# Patient Record
Sex: MALE | Race: WHITE | NOT HISPANIC OR LATINO | URBAN - METROPOLITAN AREA
[De-identification: names, ages, dates, MRNs, and addresses within clinical notes are randomized per-mention and may not be internally consistent; named-entity substitution may affect disease eponyms.]

---

## 2017-01-04 ENCOUNTER — APPOINTMENT (RX ONLY)
Dept: URBAN - METROPOLITAN AREA CLINIC 24 | Facility: CLINIC | Age: 52
Setting detail: DERMATOLOGY
End: 2017-01-04

## 2017-01-04 DIAGNOSIS — L73.9 FOLLICULAR DISORDER, UNSPECIFIED: ICD-10-CM

## 2017-01-04 DIAGNOSIS — L738 OTHER SPECIFIED DISEASES OF HAIR AND HAIR FOLLICLES: ICD-10-CM

## 2017-01-04 DIAGNOSIS — B00.1 HERPESVIRAL VESICULAR DERMATITIS: ICD-10-CM

## 2017-01-04 DIAGNOSIS — L82.1 OTHER SEBORRHEIC KERATOSIS: ICD-10-CM

## 2017-01-04 DIAGNOSIS — B07.8 OTHER VIRAL WARTS: ICD-10-CM

## 2017-01-04 DIAGNOSIS — L663 OTHER SPECIFIED DISEASES OF HAIR AND HAIR FOLLICLES: ICD-10-CM

## 2017-01-04 PROBLEM — L02.424 FURUNCLE OF LEFT UPPER LIMB: Status: ACTIVE | Noted: 2017-01-04

## 2017-01-04 PROBLEM — L85.3 XEROSIS CUTIS: Status: ACTIVE | Noted: 2017-01-04

## 2017-01-04 PROBLEM — L55.1 SUNBURN OF SECOND DEGREE: Status: ACTIVE | Noted: 2017-01-04

## 2017-01-04 PROBLEM — J30.1 ALLERGIC RHINITIS DUE TO POLLEN: Status: ACTIVE | Noted: 2017-01-04

## 2017-01-04 PROBLEM — L02.221 FURUNCLE OF ABDOMINAL WALL: Status: ACTIVE | Noted: 2017-01-04

## 2017-01-04 PROBLEM — L02.223 FURUNCLE OF CHEST WALL: Status: ACTIVE | Noted: 2017-01-04

## 2017-01-04 PROBLEM — L02.422 FURUNCLE OF LEFT AXILLA: Status: ACTIVE | Noted: 2017-01-04

## 2017-01-04 PROBLEM — L02.423 FURUNCLE OF RIGHT UPPER LIMB: Status: ACTIVE | Noted: 2017-01-04

## 2017-01-04 PROBLEM — L02.421 FURUNCLE OF RIGHT AXILLA: Status: ACTIVE | Noted: 2017-01-04

## 2017-01-04 PROBLEM — L02.222 FURUNCLE OF BACK [ANY PART, EXCEPT BUTTOCK]: Status: ACTIVE | Noted: 2017-01-04

## 2017-01-04 PROCEDURE — ? LIQUID NITROGEN (COSMETIC)

## 2017-01-04 PROCEDURE — ? COUNSELING

## 2017-01-04 PROCEDURE — ? ORDER TESTS

## 2017-01-04 PROCEDURE — ? OTHER

## 2017-01-04 PROCEDURE — ? PRESCRIPTION

## 2017-01-04 PROCEDURE — ? LIQUID NITROGEN

## 2017-01-04 PROCEDURE — ? TREATMENT REGIMEN

## 2017-01-04 PROCEDURE — 99213 OFFICE O/P EST LOW 20 MIN: CPT | Mod: 25

## 2017-01-04 PROCEDURE — 17110 DESTRUCTION B9 LES UP TO 14: CPT

## 2017-01-04 RX ORDER — MUPIROCIN 20 MG/G
OINTMENT TOPICAL
Qty: 1 | Refills: 3 | Status: ERX | COMMUNITY
Start: 2017-01-04

## 2017-01-04 RX ADMIN — MUPIROCIN: 20 OINTMENT TOPICAL at 00:00

## 2017-01-04 ASSESSMENT — LOCATION ZONE DERM
LOCATION ZONE: FINGER
LOCATION ZONE: LEG
LOCATION ZONE: ARM
LOCATION ZONE: HAND
LOCATION ZONE: FACE
LOCATION ZONE: AXILLAE
LOCATION ZONE: TRUNK

## 2017-01-04 ASSESSMENT — LOCATION DETAILED DESCRIPTION DERM
LOCATION DETAILED: RIGHT AXILLARY VAULT
LOCATION DETAILED: RIGHT RIB CAGE
LOCATION DETAILED: LEFT SUPERIOR CENTRAL MALAR CHEEK
LOCATION DETAILED: LEFT AXILLARY VAULT
LOCATION DETAILED: LEFT DISTAL DORSAL FOREARM
LOCATION DETAILED: LEFT RIB CAGE
LOCATION DETAILED: LEFT SUPERIOR MEDIAL UPPER BACK
LOCATION DETAILED: RIGHT ANTERIOR PROXIMAL UPPER ARM
LOCATION DETAILED: LEFT RADIAL DORSAL HAND
LOCATION DETAILED: RIGHT RADIAL DORSAL HAND
LOCATION DETAILED: RIGHT DORSAL THUMB METACARPOPHALANGEAL JOINT
LOCATION DETAILED: STERNUM
LOCATION DETAILED: LEFT INFERIOR UPPER BACK
LOCATION DETAILED: LEFT ANTERIOR PROXIMAL UPPER ARM
LOCATION DETAILED: LEFT DISTAL PRETIBIAL REGION
LOCATION DETAILED: RIGHT VENTRAL DISTAL FOREARM
LOCATION DETAILED: LEFT ANTERIOR DISTAL UPPER ARM

## 2017-01-04 ASSESSMENT — LOCATION SIMPLE DESCRIPTION DERM
LOCATION SIMPLE: RIGHT UPPER ARM
LOCATION SIMPLE: RIGHT AXILLARY VAULT
LOCATION SIMPLE: LEFT HAND
LOCATION SIMPLE: ABDOMEN
LOCATION SIMPLE: RIGHT HAND
LOCATION SIMPLE: RIGHT THUMB
LOCATION SIMPLE: CHEST
LOCATION SIMPLE: LEFT UPPER ARM
LOCATION SIMPLE: RIGHT FOREARM
LOCATION SIMPLE: LEFT CHEEK
LOCATION SIMPLE: LEFT PRETIBIAL REGION
LOCATION SIMPLE: LEFT FOREARM
LOCATION SIMPLE: LEFT AXILLARY VAULT
LOCATION SIMPLE: LEFT UPPER BACK

## 2017-01-04 NOTE — PROCEDURE: LIQUID NITROGEN (COSMETIC)
Detail Level: Detailed
Render Post-Care Instructions In Note?: yes
Post-Care Instructions: I reviewed with the patient in detail post-care instructions. Patient is to wear sunprotection, and avoid picking at any of the treated lesions. Pt may apply Vaseline to crusted or scabbing areas.
Consent: The patient's consent was obtained including but not limited to risks of crusting, scabbing, blistering, scarring, darker or lighter pigmentary change, recurrence, incomplete removal and infection. The patient understands that the procedure is cosmetic in nature and is not covered by insurance.

## 2017-01-04 NOTE — PROCEDURE: TREATMENT REGIMEN
Initiate Treatment: Mupirocin to nares for decolonization
Detail Level: Zone
Continue Regimen: Panoxyl, hibiclens
Plan: Pityrosporum vs bacterial. Treatment pending culture results

## 2017-01-04 NOTE — HPI: INFECTION (FOLLICULITIS)
How Severe Is It?: moderate
Is This A New Presentation, Or A Follow-Up?: Follow Up Folliculitis
Additional History: He has been treated in the past w doxy, amoxicillin, cipro, diflucan, ivermectin, medrol dose pack, clindamycin solution, hibiclens, panoxl. 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 Treatments help but rash recurs once he stops antibiotics.

## 2017-01-04 NOTE — PROCEDURE: OTHER
Other (Free Text): Majority of \"warts\" appear to be SK
Note Text (......Xxx Chief Complaint.): This diagnosis correlates with the
Detail Level: Zone
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 He has been taking 400 mg daily. I did discuss that HSV involving the eye needs close follow up w ophthalmology. There is previous documentation of genital HSV but nothing around eye. Will discus w Dr Cantu tomorrow and give further recs to patient.

## 2017-01-13 ENCOUNTER — RX ONLY (OUTPATIENT)
Age: 52
Setting detail: RX ONLY
End: 2017-01-13

## 2017-01-13 RX ORDER — KETOCONAZOLE 20.5 MG/ML
SHAMPOO, SUSPENSION TOPICAL
Qty: 1 | Refills: 11 | Status: ERX | COMMUNITY
Start: 2017-01-13

## 2017-01-13 RX ORDER — FLUCONAZOLE 150 MG/1
TABLET ORAL
Qty: 14 | Refills: 0 | Status: ERX | COMMUNITY
Start: 2017-01-13

## 2017-03-06 ENCOUNTER — HOSPITAL ENCOUNTER (OUTPATIENT)
Age: 52
Setting detail: OBSERVATION
Discharge: OTHER INSTITUTION | End: 2017-03-07
Attending: EMERGENCY MEDICINE | Admitting: INTERNAL MEDICINE
Payer: COMMERCIAL

## 2017-03-06 ENCOUNTER — APPOINTMENT (OUTPATIENT)
Dept: MRI IMAGING | Age: 52
End: 2017-03-06
Attending: EMERGENCY MEDICINE
Payer: COMMERCIAL

## 2017-03-06 ENCOUNTER — APPOINTMENT (OUTPATIENT)
Dept: ULTRASOUND IMAGING | Age: 52
End: 2017-03-06
Attending: INTERNAL MEDICINE
Payer: COMMERCIAL

## 2017-03-06 ENCOUNTER — APPOINTMENT (OUTPATIENT)
Dept: CT IMAGING | Age: 52
End: 2017-03-06
Attending: EMERGENCY MEDICINE
Payer: COMMERCIAL

## 2017-03-06 DIAGNOSIS — R20.2 FACIAL PARESTHESIA: ICD-10-CM

## 2017-03-06 DIAGNOSIS — R20.2 ARM PARESTHESIA, LEFT: Primary | ICD-10-CM

## 2017-03-06 LAB
ALBUMIN SERPL BCP-MCNC: 4 G/DL (ref 3.5–5)
ALBUMIN/GLOB SERPL: 1.3 {RATIO} (ref 1.2–3.5)
ALP SERPL-CCNC: 55 U/L (ref 50–136)
ALT SERPL-CCNC: 56 U/L (ref 12–65)
ANION GAP BLD CALC-SCNC: 10 MMOL/L (ref 7–16)
AST SERPL W P-5'-P-CCNC: 30 U/L (ref 15–37)
ATRIAL RATE: 70 BPM
BACTERIA SPEC CULT: NORMAL
BASOPHILS # BLD AUTO: 0 K/UL (ref 0–0.2)
BASOPHILS # BLD: 1 % (ref 0–2)
BILIRUB SERPL-MCNC: 0.6 MG/DL (ref 0.2–1.1)
BUN SERPL-MCNC: 9 MG/DL (ref 6–23)
CALCIUM SERPL-MCNC: 9 MG/DL (ref 8.3–10.4)
CALCULATED P AXIS, ECG09: 52 DEGREES
CALCULATED R AXIS, ECG10: 34 DEGREES
CALCULATED T AXIS, ECG11: 49 DEGREES
CHLORIDE SERPL-SCNC: 105 MMOL/L (ref 98–107)
CO2 SERPL-SCNC: 28 MMOL/L (ref 21–32)
CREAT SERPL-MCNC: 1.12 MG/DL (ref 0.8–1.5)
D DIMER PPP FEU-MCNC: <0.19 UG/ML(FEU)
DIAGNOSIS, 93000: NORMAL
DIASTOLIC BP, ECG02: NORMAL MMHG
DIFFERENTIAL METHOD BLD: ABNORMAL
EOSINOPHIL # BLD: 0.2 K/UL (ref 0–0.8)
EOSINOPHIL NFR BLD: 3 % (ref 0.5–7.8)
ERYTHROCYTE [DISTWIDTH] IN BLOOD BY AUTOMATED COUNT: 12.3 % (ref 11.9–14.6)
GLOBULIN SER CALC-MCNC: 3.2 G/DL (ref 2.3–3.5)
GLUCOSE BLD STRIP.AUTO-MCNC: 99 MG/DL (ref 65–100)
GLUCOSE SERPL-MCNC: 93 MG/DL (ref 65–100)
HCT VFR BLD AUTO: 43.7 % (ref 41.1–50.3)
HGB BLD-MCNC: 15.4 G/DL (ref 13.6–17.2)
IMM GRANULOCYTES # BLD: 0.1 K/UL (ref 0–0.5)
IMM GRANULOCYTES NFR BLD AUTO: 0.8 % (ref 0–5)
INR BLD: 1.1 (ref 0.9–1.2)
LYMPHOCYTES # BLD AUTO: 36 % (ref 13–44)
LYMPHOCYTES # BLD: 2.1 K/UL (ref 0.5–4.6)
MCH RBC QN AUTO: 30.4 PG (ref 26.1–32.9)
MCHC RBC AUTO-ENTMCNC: 35.2 G/DL (ref 31.4–35)
MCV RBC AUTO: 86.4 FL (ref 79.6–97.8)
MONOCYTES # BLD: 0.4 K/UL (ref 0.1–1.3)
MONOCYTES NFR BLD AUTO: 6 % (ref 4–12)
NEUTS SEG # BLD: 3.2 K/UL (ref 1.7–8.2)
NEUTS SEG NFR BLD AUTO: 53 % (ref 43–78)
P-R INTERVAL, ECG05: 170 MS
PLATELET # BLD AUTO: 161 K/UL (ref 150–450)
PMV BLD AUTO: 11.9 FL (ref 10.8–14.1)
POTASSIUM SERPL-SCNC: 3.5 MMOL/L (ref 3.5–5.1)
PROT SERPL-MCNC: 7.2 G/DL (ref 6.3–8.2)
PT BLD: 12.9 SECS (ref 9.6–11.6)
Q-T INTERVAL, ECG07: 390 MS
QRS DURATION, ECG06: 90 MS
QTC CALCULATION (BEZET), ECG08: 421 MS
RBC # BLD AUTO: 5.06 M/UL (ref 4.23–5.67)
SERVICE CMNT-IMP: NORMAL
SODIUM SERPL-SCNC: 143 MMOL/L (ref 136–145)
SYSTOLIC BP, ECG01: NORMAL MMHG
TROPONIN I BLD-MCNC: 0.01 NG/ML (ref 0–0.08)
VENTRICULAR RATE, ECG03: 70 BPM
WBC # BLD AUTO: 5.9 K/UL (ref 4.3–11.1)

## 2017-03-06 PROCEDURE — 74011250636 HC RX REV CODE- 250/636: Performed by: INTERNAL MEDICINE

## 2017-03-06 PROCEDURE — 82962 GLUCOSE BLOOD TEST: CPT

## 2017-03-06 PROCEDURE — 80053 COMPREHEN METABOLIC PANEL: CPT | Performed by: EMERGENCY MEDICINE

## 2017-03-06 PROCEDURE — 93005 ELECTROCARDIOGRAM TRACING: CPT | Performed by: EMERGENCY MEDICINE

## 2017-03-06 PROCEDURE — 99218 HC RM OBSERVATION: CPT

## 2017-03-06 PROCEDURE — 99285 EMERGENCY DEPT VISIT HI MDM: CPT | Performed by: EMERGENCY MEDICINE

## 2017-03-06 PROCEDURE — 87641 MR-STAPH DNA AMP PROBE: CPT | Performed by: INTERNAL MEDICINE

## 2017-03-06 PROCEDURE — 70450 CT HEAD/BRAIN W/O DYE: CPT

## 2017-03-06 PROCEDURE — 70551 MRI BRAIN STEM W/O DYE: CPT

## 2017-03-06 PROCEDURE — 85379 FIBRIN DEGRADATION QUANT: CPT | Performed by: INTERNAL MEDICINE

## 2017-03-06 PROCEDURE — 85610 PROTHROMBIN TIME: CPT

## 2017-03-06 PROCEDURE — 84484 ASSAY OF TROPONIN QUANT: CPT

## 2017-03-06 PROCEDURE — 93880 EXTRACRANIAL BILAT STUDY: CPT

## 2017-03-06 PROCEDURE — 85025 COMPLETE CBC W/AUTO DIFF WBC: CPT | Performed by: EMERGENCY MEDICINE

## 2017-03-06 RX ORDER — AMOXICILLIN 250 MG
2 CAPSULE ORAL
Status: DISCONTINUED | OUTPATIENT
Start: 2017-03-06 | End: 2017-03-07 | Stop reason: HOSPADM

## 2017-03-06 RX ORDER — SODIUM CHLORIDE 0.9 % (FLUSH) 0.9 %
5-10 SYRINGE (ML) INJECTION EVERY 8 HOURS
Status: DISCONTINUED | OUTPATIENT
Start: 2017-03-06 | End: 2017-03-07 | Stop reason: HOSPADM

## 2017-03-06 RX ORDER — ENOXAPARIN SODIUM 100 MG/ML
40 INJECTION SUBCUTANEOUS EVERY 24 HOURS
Status: DISCONTINUED | OUTPATIENT
Start: 2017-03-06 | End: 2017-03-07

## 2017-03-06 RX ORDER — SODIUM CHLORIDE 0.9 % (FLUSH) 0.9 %
5-10 SYRINGE (ML) INJECTION AS NEEDED
Status: DISCONTINUED | OUTPATIENT
Start: 2017-03-06 | End: 2017-03-07 | Stop reason: HOSPADM

## 2017-03-06 RX ORDER — ASPIRIN 325 MG
325 TABLET ORAL DAILY
Status: DISCONTINUED | OUTPATIENT
Start: 2017-03-07 | End: 2017-03-07 | Stop reason: HOSPADM

## 2017-03-06 RX ORDER — SODIUM CHLORIDE 0.9 % (FLUSH) 0.9 %
5 SYRINGE (ML) INJECTION AS NEEDED
Status: DISCONTINUED | OUTPATIENT
Start: 2017-03-06 | End: 2017-03-07 | Stop reason: HOSPADM

## 2017-03-06 RX ADMIN — ENOXAPARIN SODIUM 40 MG: 40 INJECTION SUBCUTANEOUS at 20:08

## 2017-03-06 RX ADMIN — Medication 10 ML: at 22:00

## 2017-03-06 NOTE — ED PROVIDER NOTES
HPI Comments: 73-year-old male noted onset of numbness in his left side especially his left arm and face at 5:00 yesterday. Involvement in regard last about 30 minutes with involvement of the face it persisted into today. On the left side. Not noticing any numbness or tingling in the left leg. There is no pain including no headache or chest pain. No change in vision speech swallowing hearing or taste. There is some family history of migraine. He's never had a problem like this before. He has not noted any facial droop or clumsiness with his left hand. Patient is a 50 y.o. male presenting with numbness. The history is provided by the patient. Numbness   This is a new problem. The current episode started yesterday. The problem has been gradually improving. There was left facial and left upper extremity focality noted. Primary symptoms include loss of sensation. Pertinent negatives include no focal weakness, no loss of balance, no slurred speech, no speech difficulty, no movement disorder, no visual change, no auditory change and no mental status change. There has been no fever. Pertinent negatives include no shortness of breath, no chest pain, no vomiting, no headaches and no nausea. Associated medical issues do not include bleeding disorder, seizures or CVA. Past Medical History:   Diagnosis Date    History of kidney stones 1982       History reviewed. No pertinent surgical history. History reviewed. No pertinent family history. Social History     Social History    Marital status:      Spouse name: N/A    Number of children: N/A    Years of education: N/A     Occupational History    Not on file.      Social History Main Topics    Smoking status: Never Smoker    Smokeless tobacco: Not on file    Alcohol use No    Drug use: Not on file    Sexual activity: Not on file     Other Topics Concern    Not on file     Social History Narrative         ALLERGIES: Amoxil [amoxicillin] and Codeine    Review of Systems   Constitutional: Negative for chills and fever. Respiratory: Negative for cough and shortness of breath. Cardiovascular: Negative for chest pain and palpitations. Gastrointestinal: Negative for abdominal pain, diarrhea, nausea and vomiting. Genitourinary: Negative for dysuria and flank pain. Musculoskeletal: Negative for back pain and neck pain. Skin: Negative for color change and rash. Neurological: Positive for numbness. Negative for dizziness, focal weakness, seizures, syncope, speech difficulty, weakness, light-headedness, headaches and loss of balance. All other systems reviewed and are negative. Vitals:    03/06/17 1441   Pulse: 74   Resp: 14   Temp: 98.7 °F (37.1 °C)   SpO2: 99%   Weight: 120.2 kg (265 lb)   Height: 5' 10\" (1.778 m)            Physical Exam   Constitutional: He is oriented to person, place, and time. He appears well-developed and well-nourished. No distress. HENT:   Head: Normocephalic and atraumatic. Mouth/Throat: Oropharynx is clear and moist. No oropharyngeal exudate. Eyes: Conjunctivae and EOM are normal. Pupils are equal, round, and reactive to light. Neck: Normal range of motion. Neck supple. Cardiovascular: Normal rate, regular rhythm and intact distal pulses. Murmur heard. Systolic murmur is present with a grade of 3/6   Pulmonary/Chest: Breath sounds normal. No respiratory distress. Abdominal: No hernia. Neurological: He is alert and oriented to person, place, and time. A sensory deficit is present. Gait normal. GCS eye subscore is 4. GCS verbal subscore is 5. GCS motor subscore is 6. Nl speech  Normal finger-nose testing. No drift. Leg strength is normal.  Decreased sensation to light touch left face   Skin: Skin is warm and dry. Psychiatric: He has a normal mood and affect. His speech is normal.   Nursing note and vitals reviewed.        MDM  Number of Diagnoses or Management Options  Diagnosis management comments: I do not believe this to be Bell's palsy. Possibly migraine equivalent. concern for TIA or CVA. Basic concern is the patient states he has a heart murmur this never been investigated. Head CT, EKG, screening labs. Amount and/or Complexity of Data Reviewed  Clinical lab tests: ordered and reviewed  Tests in the radiology section of CPT®: ordered and reviewed  Tests in the medicine section of CPT®: ordered and reviewed  Discuss the patient with other providers: yes  Independent visualization of images, tracings, or specimens: yes    Risk of Complications, Morbidity, and/or Mortality  Presenting problems: moderate  Diagnostic procedures: low  Management options: moderate      ED Course       Procedures      Results Include:    Recent Results (from the past 24 hour(s))   POC TROPONIN-I    Collection Time: 03/06/17  2:45 PM   Result Value Ref Range    Troponin-I (POC) 0.01 0.0 - 0.08 ng/ml   CBC WITH AUTOMATED DIFF    Collection Time: 03/06/17  2:47 PM   Result Value Ref Range    WBC 5.9 4.3 - 11.1 K/uL    RBC 5.06 4.23 - 5.67 M/uL    HGB 15.4 13.6 - 17.2 g/dL    HCT 43.7 41.1 - 50.3 %    MCV 86.4 79.6 - 97.8 FL    MCH 30.4 26.1 - 32.9 PG    MCHC 35.2 (H) 31.4 - 35.0 g/dL    RDW 12.3 11.9 - 14.6 %    PLATELET 741 583 - 425 K/uL    MPV 11.9 10.8 - 14.1 FL    DF AUTOMATED      NEUTROPHILS 53 43 - 78 %    LYMPHOCYTES 36 13 - 44 %    MONOCYTES 6 4.0 - 12.0 %    EOSINOPHILS 3 0.5 - 7.8 %    BASOPHILS 1 0.0 - 2.0 %    IMMATURE GRANULOCYTES 0.8 0.0 - 5.0 %    ABS. NEUTROPHILS 3.2 1.7 - 8.2 K/UL    ABS. LYMPHOCYTES 2.1 0.5 - 4.6 K/UL    ABS. MONOCYTES 0.4 0.1 - 1.3 K/UL    ABS. EOSINOPHILS 0.2 0.0 - 0.8 K/UL    ABS. BASOPHILS 0.0 0.0 - 0.2 K/UL    ABS. IMM.  GRANS. 0.1 0.0 - 0.5 K/UL   METABOLIC PANEL, COMPREHENSIVE    Collection Time: 03/06/17  2:47 PM   Result Value Ref Range    Sodium 143 136 - 145 mmol/L    Potassium 3.5 3.5 - 5.1 mmol/L    Chloride 105 98 - 107 mmol/L    CO2 28 21 - 32 mmol/L Anion gap 10 7 - 16 mmol/L    Glucose 93 65 - 100 mg/dL    BUN 9 6 - 23 MG/DL    Creatinine 1.12 0.8 - 1.5 MG/DL    GFR est AA >60 >60 ml/min/1.73m2    GFR est non-AA >60 >60 ml/min/1.73m2    Calcium 9.0 8.3 - 10.4 MG/DL    Bilirubin, total 0.6 0.2 - 1.1 MG/DL    ALT (SGPT) 56 12 - 65 U/L    AST (SGOT) 30 15 - 37 U/L    Alk. phosphatase 55 50 - 136 U/L    Protein, total 7.2 6.3 - 8.2 g/dL    Albumin 4.0 3.5 - 5.0 g/dL    Globulin 3.2 2.3 - 3.5 g/dL    A-G Ratio 1.3 1.2 - 3.5     EKG, 12 LEAD, INITIAL    Collection Time: 03/06/17  2:49 PM   Result Value Ref Range    Systolic BP  mmHg    Diastolic BP  mmHg    Ventricular Rate 70 BPM    Atrial Rate 70 BPM    P-R Interval 170 ms    QRS Duration 90 ms    Q-T Interval 390 ms    QTC Calculation (Bezet) 421 ms    Calculated P Axis 52 degrees    Calculated R Axis 34 degrees    Calculated T Axis 49 degrees    Diagnosis       !! AGE AND GENDER SPECIFIC ECG ANALYSIS !! Normal sinus rhythm  Normal ECG  No previous ECGs available     POC PT/INR    Collection Time: 03/06/17  2:53 PM   Result Value Ref Range    Prothrombin time (POC) 12.9 (H) 9.6 - 11.6 SECS    INR (POC) 1.1 0.9 - 1.2     GLUCOSE, POC    Collection Time: 03/06/17  2:53 PM   Result Value Ref Range    Glucose (POC) 99 65 - 100 mg/dL     Ct Head Wo Cont    Result Date: 3/6/2017  Noncontrast head CT Clinical Indication: Left side paresthesias for 2 days. Technique: Noncontrast axial images were obtained through the brain. Comparison: None available Findings: There is no acute intracranial hemorrhage, hydrocephalus, intra-axial mass, or mass-effect. There is no CT evidence of acute large artery territorial infarction or abnormal extra-axial fluid collection. The mastoid air cells and paranasal sinuses are notable for partially visualized dependent fluid and debris in the left maxillary sinus. No displaced skull fractures are present. Impression: No acute intracranial abnormality. Left maxillary sinusitis. Spoke with the hospitalist who will see the patient. MRI ordered. We'll rate checked BP is initial was about 160/85.

## 2017-03-06 NOTE — H&P
HOSPITALIST HISTORY AND PHYSICAL  NAME:  Satnam Avalos   Age:  50 y.o.  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 Patient states he has been feeling generally unwell for the last several weeks and unable to sleep, has a very dry mouth. He is not on any meds for the HTN. And the murmur has never been investigated. Yesterday evening around 5pm he started experiencing a tingling and numbness in the left arm that went into the left side of his face. It came up his arm in a wave and then went down his arm in a wave but the numbness in the face did not resolve. It lasted about 40 min. Seen by doctor at his job this am because the left sided numbness bothered him and he was referred to our ER. In the ER he is concerning for tia vs cva. So hospitalist asked to admit. Complete ROS done and is as stated in HPI or otherwise negative  Past Medical History:   Diagnosis Date    History of kidney stones       History reviewed. No pertinent surgical history. Prior to Admission Medications   Prescriptions Last Dose Informant Patient Reported? Taking?   acyclovir (ZOVIRAX) 200 mg capsule   Yes No   Sig: Take  by mouth every four (4) hours (while awake). tadalafil (CIALIS) 5 mg tablet   No No   Sig: Take 1 Tab by mouth daily. Facility-Administered Medications: None     Allergies   Allergen Reactions    Amoxil [Amoxicillin] Not Reported This Time    Codeine Not Reported This Time      Social History   Substance Use Topics    Smoking status: Never Smoker    Smokeless tobacco: Not on file    Alcohol use No      History reviewed. No pertinent family history.    Objective:     Visit Vitals    /88    Pulse 81    Temp 98.7 °F (37.1 °C)    Resp 14    Ht 5' 10\" (1.778 m)    Wt 120.2 kg (265 lb)    SpO2 95%    BMI 38.02 kg/m2      Temp (24hrs), Av.7 °F (37.1 °C), Min:98.7 °F (37.1 °C), Max:98.7 °F (37.1 °C)    Oxygen Therapy  O2 Sat (%): 95 % (17 1609)  Pulse via Oximetry: 79 beats per minute (17 1609)  O2 Device: Room air (17 1441)  Physical Exam:  General:    Alert, cooperative, no distress, appears stated age. Head:   Normocephalic, without obvious abnormality, atraumatic. Nose:  Nares normal. No drainage or sinus tenderness. Lungs:   Clear to auscultation bilaterally. No Wheezing or Rhonchi. No rales. Heart:   Regular rate and rhythm,  systolic murmur, rub or gallop. Abdomen:   Soft, non-tender. Not distended. Bowel sounds normal.   Extremities: No cyanosis. No edema. No clubbing  Skin:     Texture, turgor normal. No rashes or lesions. Not Jaundiced  Neurologic: Alert and oriented x 3, no focal deficits   Data Review: personally by me   Recent Results (from the past 24 hour(s))   POC TROPONIN-I    Collection Time: 17  2:45 PM   Result Value Ref Range    Troponin-I (POC) 0.01 0.0 - 0.08 ng/ml   CBC WITH AUTOMATED DIFF    Collection Time: 17  2:47 PM   Result Value Ref Range    WBC 5.9 4.3 - 11.1 K/uL    RBC 5.06 4.23 - 5.67 M/uL    HGB 15.4 13.6 - 17.2 g/dL    HCT 43.7 41.1 - 50.3 %    MCV 86.4 79.6 - 97.8 FL    MCH 30.4 26.1 - 32.9 PG    MCHC 35.2 (H) 31.4 - 35.0 g/dL    RDW 12.3 11.9 - 14.6 %    PLATELET 826 536 - 155 K/uL    MPV 11.9 10.8 - 14.1 FL    DF AUTOMATED      NEUTROPHILS 53 43 - 78 %    LYMPHOCYTES 36 13 - 44 %    MONOCYTES 6 4.0 - 12.0 %    EOSINOPHILS 3 0.5 - 7.8 %    BASOPHILS 1 0.0 - 2.0 %    IMMATURE GRANULOCYTES 0.8 0.0 - 5.0 %    ABS. NEUTROPHILS 3.2 1.7 - 8.2 K/UL    ABS. LYMPHOCYTES 2.1 0.5 - 4.6 K/UL    ABS. MONOCYTES 0.4 0.1 - 1.3 K/UL    ABS. EOSINOPHILS 0.2 0.0 - 0.8 K/UL    ABS. BASOPHILS 0.0 0.0 - 0.2 K/UL    ABS. IMM. GRANS. 0.1 0.0 - 0.5 K/UL   METABOLIC PANEL, COMPREHENSIVE    Collection Time: 03/06/17  2:47 PM   Result Value Ref Range    Sodium 143 136 - 145 mmol/L    Potassium 3.5 3.5 - 5.1 mmol/L    Chloride 105 98 - 107 mmol/L    CO2 28 21 - 32 mmol/L    Anion gap 10 7 - 16 mmol/L    Glucose 93 65 - 100 mg/dL    BUN 9 6 - 23 MG/DL    Creatinine 1.12 0.8 - 1.5 MG/DL    GFR est AA >60 >60 ml/min/1.73m2    GFR est non-AA >60 >60 ml/min/1.73m2    Calcium 9.0 8.3 - 10.4 MG/DL    Bilirubin, total 0.6 0.2 - 1.1 MG/DL    ALT (SGPT) 56 12 - 65 U/L    AST (SGOT) 30 15 - 37 U/L    Alk. phosphatase 55 50 - 136 U/L    Protein, total 7.2 6.3 - 8.2 g/dL    Albumin 4.0 3.5 - 5.0 g/dL    Globulin 3.2 2.3 - 3.5 g/dL    A-G Ratio 1.3 1.2 - 3.5     EKG, 12 LEAD, INITIAL    Collection Time: 03/06/17  2:49 PM   Result Value Ref Range    Systolic BP  mmHg    Diastolic BP  mmHg    Ventricular Rate 70 BPM    Atrial Rate 70 BPM    P-R Interval 170 ms    QRS Duration 90 ms    Q-T Interval 390 ms    QTC Calculation (Bezet) 421 ms    Calculated P Axis 52 degrees    Calculated R Axis 34 degrees    Calculated T Axis 49 degrees    Diagnosis       !! AGE AND GENDER SPECIFIC ECG ANALYSIS !! Normal sinus rhythm  Normal ECG  No previous ECGs available     POC PT/INR    Collection Time: 03/06/17  2:53 PM   Result Value Ref Range    Prothrombin time (POC) 12.9 (H) 9.6 - 11.6 SECS    INR (POC) 1.1 0.9 - 1.2     GLUCOSE, POC    Collection Time: 03/06/17  2:53 PM   Result Value Ref Range    Glucose (POC) 99 65 - 100 mg/dL     Imaging /Procedures /Studies reviewed personally by me  XR Results (most recent):  No results found for this or any previous visit. CT Scan    Results from Hospital Encounter encounter on 03/06/17   CT HEAD WO CONT   Narrative Noncontrast head CT     Clinical Indication: Left side paresthesias for 2 days. Technique: Noncontrast axial images were obtained through the brain. Comparison: None available    Findings:  There is no acute intracranial hemorrhage, hydrocephalus, intra-axial  mass, or mass-effect. There is no CT evidence of acute large artery territorial  infarction or abnormal extra-axial fluid collection. The mastoid air cells and paranasal sinuses are notable for partially visualized  dependent fluid and debris in the left maxillary sinus. No displaced skull fractures are present. Impression Impression: No acute intracranial abnormality. Left maxillary sinusitis. VAS/US Results (most recent):  No results found for this or any previous visit. Assessment and Plan:   There are no active hospital problems to display for this patient. PLAN  ·   Left sided facial numbness- ?  TIA vs cva- ct head negative will get MRI, carotids dopplers, check BP in both arms, lipid panel, hba1c  · HTN- uncontrolled- pt will need to start bp meds  · Cardiac murmur- systolic will get a better look at cardiac fxn with echo and make further recommendations from there- pt feels like he needs to be referred to a cardiologist at some point -   · Herpes- left eye - pt on high dose acyclovir for a recent flair- improved  · gerd- cont home meds    Code Status:   Full  Anticipated discharge:   1-2 days  Signed By: Erlin Magana MD     March 6, 2017

## 2017-03-06 NOTE — ED TRIAGE NOTES
C/o numbness down lt arm and into face yesterday, today just lt sided facial numbness. Denies hx of stroke. Hx of HTN and heart murmur  .

## 2017-03-07 ENCOUNTER — HOSPITAL ENCOUNTER (INPATIENT)
Age: 52
LOS: 1 days | Discharge: HOME | DRG: 287 | End: 2017-03-08
Attending: INTERNAL MEDICINE | Admitting: INTERNAL MEDICINE
Payer: COMMERCIAL

## 2017-03-07 VITALS
DIASTOLIC BLOOD PRESSURE: 88 MMHG | RESPIRATION RATE: 16 BRPM | HEIGHT: 70 IN | SYSTOLIC BLOOD PRESSURE: 158 MMHG | TEMPERATURE: 97.4 F | OXYGEN SATURATION: 95 % | BODY MASS INDEX: 37.39 KG/M2 | WEIGHT: 261.2 LBS | HEART RATE: 71 BPM

## 2017-03-07 PROBLEM — I34.0 MITRAL REGURGITATION: Status: ACTIVE | Noted: 2017-03-07

## 2017-03-07 PROBLEM — G45.9 TIA (TRANSIENT ISCHEMIC ATTACK): Status: ACTIVE | Noted: 2017-03-07

## 2017-03-07 PROBLEM — G47.33 OSA (OBSTRUCTIVE SLEEP APNEA): Status: ACTIVE | Noted: 2017-03-07

## 2017-03-07 PROBLEM — K21.9 GERD (GASTROESOPHAGEAL REFLUX DISEASE): Status: ACTIVE | Noted: 2017-03-07

## 2017-03-07 PROBLEM — I10 ESSENTIAL HYPERTENSION: Status: ACTIVE | Noted: 2017-03-07

## 2017-03-07 PROBLEM — R06.02 SHORTNESS OF BREATH: Status: ACTIVE | Noted: 2017-03-07

## 2017-03-07 LAB
CHOLEST SERPL-MCNC: 156 MG/DL
EST. AVERAGE GLUCOSE BLD GHB EST-MCNC: NORMAL MG/DL
HBA1C MFR BLD: 4.9 % (ref 4.8–6)
HDLC SERPL-MCNC: 31 MG/DL (ref 40–60)
HDLC SERPL: 5 {RATIO}
LDLC SERPL CALC-MCNC: 84 MG/DL
LIPID PROFILE,FLP: ABNORMAL
T4 FREE SERPL-MCNC: 1 NG/DL (ref 0.78–1.46)
TRIGL SERPL-MCNC: 205 MG/DL (ref 35–150)
TSH SERPL DL<=0.005 MIU/L-ACNC: 1.39 UIU/ML (ref 0.36–3.74)
VLDLC SERPL CALC-MCNC: 41 MG/DL (ref 7–27)

## 2017-03-07 PROCEDURE — 83036 HEMOGLOBIN GLYCOSYLATED A1C: CPT | Performed by: INTERNAL MEDICINE

## 2017-03-07 PROCEDURE — 74011250637 HC RX REV CODE- 250/637: Performed by: INTERNAL MEDICINE

## 2017-03-07 PROCEDURE — 74011250636 HC RX REV CODE- 250/636: Performed by: INTERNAL MEDICINE

## 2017-03-07 PROCEDURE — 65660000000 HC RM CCU STEPDOWN

## 2017-03-07 PROCEDURE — 74011000250 HC RX REV CODE- 250: Performed by: INTERNAL MEDICINE

## 2017-03-07 PROCEDURE — 84443 ASSAY THYROID STIM HORMONE: CPT | Performed by: INTERNAL MEDICINE

## 2017-03-07 PROCEDURE — 97165 OT EVAL LOW COMPLEX 30 MIN: CPT

## 2017-03-07 PROCEDURE — 99218 HC RM OBSERVATION: CPT

## 2017-03-07 PROCEDURE — 87040 BLOOD CULTURE FOR BACTERIA: CPT | Performed by: INTERNAL MEDICINE

## 2017-03-07 PROCEDURE — 80061 LIPID PANEL: CPT | Performed by: INTERNAL MEDICINE

## 2017-03-07 PROCEDURE — 92610 EVALUATE SWALLOWING FUNCTION: CPT | Performed by: SPEECH-LANGUAGE PATHOLOGIST

## 2017-03-07 PROCEDURE — 84439 ASSAY OF FREE THYROXINE: CPT | Performed by: INTERNAL MEDICINE

## 2017-03-07 PROCEDURE — 36415 COLL VENOUS BLD VENIPUNCTURE: CPT | Performed by: INTERNAL MEDICINE

## 2017-03-07 PROCEDURE — 97161 PT EVAL LOW COMPLEX 20 MIN: CPT

## 2017-03-07 PROCEDURE — C8929 TTE W OR WO FOL WCON,DOPPLER: HCPCS

## 2017-03-07 RX ORDER — DOXYCYCLINE 100 MG/1
100 CAPSULE ORAL EVERY 12 HOURS
Status: DISCONTINUED | OUTPATIENT
Start: 2017-03-07 | End: 2017-03-09 | Stop reason: HOSPADM

## 2017-03-07 RX ORDER — SODIUM CHLORIDE 0.9 % (FLUSH) 0.9 %
5-10 SYRINGE (ML) INJECTION AS NEEDED
Status: CANCELLED | OUTPATIENT
Start: 2017-03-07

## 2017-03-07 RX ORDER — ENOXAPARIN SODIUM 150 MG/ML
120 INJECTION SUBCUTANEOUS EVERY 12 HOURS
Status: DISCONTINUED | OUTPATIENT
Start: 2017-03-07 | End: 2017-03-07 | Stop reason: HOSPADM

## 2017-03-07 RX ORDER — SODIUM CHLORIDE 0.9 % (FLUSH) 0.9 %
5-10 SYRINGE (ML) INJECTION EVERY 8 HOURS
Status: DISCONTINUED | OUTPATIENT
Start: 2017-03-07 | End: 2017-03-09 | Stop reason: HOSPADM

## 2017-03-07 RX ORDER — ASPIRIN 325 MG
325 TABLET ORAL DAILY
Status: CANCELLED | OUTPATIENT
Start: 2017-03-08

## 2017-03-07 RX ORDER — AMOXICILLIN 250 MG
2 CAPSULE ORAL
Status: DISCONTINUED | OUTPATIENT
Start: 2017-03-07 | End: 2017-03-09 | Stop reason: HOSPADM

## 2017-03-07 RX ORDER — AMOXICILLIN 250 MG
2 CAPSULE ORAL
Status: CANCELLED | OUTPATIENT
Start: 2017-03-07

## 2017-03-07 RX ORDER — RANITIDINE 150 MG/1
150 TABLET, FILM COATED ORAL
COMMUNITY
End: 2017-03-30

## 2017-03-07 RX ORDER — SODIUM CHLORIDE 0.9 % (FLUSH) 0.9 %
5-10 SYRINGE (ML) INJECTION EVERY 8 HOURS
Status: CANCELLED | OUTPATIENT
Start: 2017-03-07

## 2017-03-07 RX ORDER — SODIUM CHLORIDE 0.9 % (FLUSH) 0.9 %
5 SYRINGE (ML) INJECTION AS NEEDED
Status: DISCONTINUED | OUTPATIENT
Start: 2017-03-07 | End: 2017-03-09 | Stop reason: HOSPADM

## 2017-03-07 RX ORDER — ASPIRIN 325 MG
325 TABLET ORAL DAILY
Status: DISCONTINUED | OUTPATIENT
Start: 2017-03-08 | End: 2017-03-09 | Stop reason: HOSPADM

## 2017-03-07 RX ORDER — SODIUM CHLORIDE 0.9 % (FLUSH) 0.9 %
5-10 SYRINGE (ML) INJECTION AS NEEDED
Status: DISCONTINUED | OUTPATIENT
Start: 2017-03-07 | End: 2017-03-09 | Stop reason: HOSPADM

## 2017-03-07 RX ORDER — SODIUM CHLORIDE 0.9 % (FLUSH) 0.9 %
5 SYRINGE (ML) INJECTION AS NEEDED
Status: CANCELLED | OUTPATIENT
Start: 2017-03-07

## 2017-03-07 RX ORDER — ENOXAPARIN SODIUM 150 MG/ML
120 INJECTION SUBCUTANEOUS EVERY 12 HOURS
Status: DISCONTINUED | OUTPATIENT
Start: 2017-03-08 | End: 2017-03-09 | Stop reason: HOSPADM

## 2017-03-07 RX ORDER — ENOXAPARIN SODIUM 150 MG/ML
120 INJECTION SUBCUTANEOUS EVERY 12 HOURS
Status: CANCELLED | OUTPATIENT
Start: 2017-03-08

## 2017-03-07 RX ADMIN — ASPIRIN 325 MG ORAL TABLET 325 MG: 325 PILL ORAL at 09:41

## 2017-03-07 RX ADMIN — ENOXAPARIN SODIUM 120 MG: 150 INJECTION SUBCUTANEOUS at 15:17

## 2017-03-07 RX ADMIN — PERFLUTREN 1 ML: 6.52 INJECTION, SUSPENSION INTRAVENOUS at 10:00

## 2017-03-07 RX ADMIN — Medication 5 ML: at 20:32

## 2017-03-07 RX ADMIN — DOXYCYCLINE HYCLATE 100 MG: 100 CAPSULE ORAL at 20:32

## 2017-03-07 RX ADMIN — STANDARDIZED SENNA CONCENTRATE AND DOCUSATE SODIUM 2 TABLET: 8.6; 5 TABLET, FILM COATED ORAL at 20:32

## 2017-03-07 RX ADMIN — Medication 10 ML: at 06:00

## 2017-03-07 RX ADMIN — Medication 5 ML: at 14:00

## 2017-03-07 NOTE — PROGRESS NOTES
Care Management Interventions  Mode of Transport at Discharge: Other (see comment)  Transition of Care Consult (CM Consult): Discharge Planning  Physical Therapy Consult: Yes  Current Support Network: Lives with Spouse  Discharge Location  Discharge Placement: Home      Referral per MD.  Stroke protocol. Chart reviewed. Pt admitted to r/o CVA vs TIA. PTA pt indep with [employer], employed. Per PT evaluation pt appears functioning at baseline. Pt evaluated by OT  & discharged from their services. Will await MD order for any d/c needs.

## 2017-03-07 NOTE — H&P
HOSPITALIST H&P/CONSULT  NAME:  Leena Kellogg   Age:  50 y.o.  :   1965   MRN:   [id number]  PCP: Isabel Parekh NP  Consulting MD:  Treatment Team: Attending Provider: Jose Dickson MD  HPI:   Patient Please refer to the admission H&P for details of presentation. 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 Patient states he has been feeling generally unwell for the last several weeks and unable to sleep, has a very dry mouth. He is not on any meds for the HTN. And the murmur has never been investigated. Yesterday evening around 5pm he started experiencing a tingling and numbness in the left arm that went into the left side of his face. It came up his arm in a wave and then went down his arm in a wave but the numbness in the face did not resolve. It lasted about 40 min. Seen by doctor at his job this am because the left sided numbness bothered him and he was referred to our ER. In the ER he is concerning for tia vs cva. So hospitalist asked to admit. Admitted for TIA eval. MRI negative. Carotids w/o significant stenosis. Echocardiogram very abnormal - severe MR and ?mitral valve. Cardiology consulted with request that patient transfer downtown for MARCUS, ? LHC and valve replacement. Placed on treatment dose lovenox until this valvular mass is further evaluated. Blood cultures pending. Also w/ left maxillary sinusitis. Will start doxycycline (med allergies).         Significant Diagnostic Studies:   Echocardiogram   SUMMARY:    -  Left ventricle: Systolic function was normal. Ejection fraction was  estimated in the range of 55 % to 60 %. There were no regional wall motion  abnormalities. Wall thickness was at the upper limits of normal.    -  Left atrium: The atrium was moderately dilated. -  Atrial septum: Contrast injection was performed.  There was no   right-to-left  shunt, with provocative maneuvers to increase right atrial pressure. -  Mitral valve: There was mild annular calcification. There was a moderate  prolapse involving the posterior leaflet. There is some nodular appearance of  the posterior leaflet and cannot excluded vegetation. Patient would benefit  from MARCUS to better assess pathology of mitral posterior leaflet. There was  moderate to severe regurgitation. The regurgitant jet was eccentric. RECOMMENDATIONS:  If clinically indicated, transesophageal echocardiography could provide  additional information. Final result (Exam End: 3/6/2017  5:24 PM) Open        Study Result      BRAIN MRI:     CLINICAL HISTORY: Left facial and upper extremity paralysis and numbness for 2  days.     TECHNIQUE: Sagittal T1 weighted, coronal FLAIR, and axial T1 and T2-weighted  spin echo, FLAIR, gradient echo, and diffusion-weighted images were obtained.     COMPARISON: HEAD CT today.     FINDINGS: No intracranial mass effect, hemorrhage, or evidence of  acute/subacute ischemia is seen. Scattered punctate T2 and FLAIR  hyperintensities scattered in the white matter are nonspecific but most  consistent with small vessel ischemic disease. The ventricles are normal in  size and configuration accounting for the patient's age. Inflammatory changes  are again noted in the left maxillary antrum. Orbits and mastoid air cells are  clear as imaged.     IMPRESSION  IMPRESSION:      1. SMALL VESSEL ISCHEMIC DISEASE WITH NO ACUTE INTRACRANIAL ABNORMALITY  IDENTIFIED.     2. LEFT MAXILLARY SINUSITIS     Final result (Exam End: 3/6/2017  8:42 PM) Open        Study Result      CAROTID SONOGRAPHY, March 6, 2017     CLINICAL HISTORY: Left upper extremity paresis.     REPORT: Without prior study for comparison, multiple images from real-time  ultrasound evaluation of the right carotid system demonstrate minimal  atherosclerotic plaquing.  Peak systolic velocity in the internal carotid is  measured at 70 cm per second, with an internal/common carotid velocity ration of  0.9. Antegrade flow was documented in the right vertebral artery.     On the left, minimal atherosclerotic plaquing was noted. Peak systolic velocity  in the left internal carotid was measured at 93 cm per second, with an  internal/common carotid velocity ratio of 1.0. Antegrade flow was also  documented in the left vertebral artery. There heterogeneous thyroid nodules  bilaterally, measuring approximately 1.2 cm on the right and 3.2 cm on the left.     IMPRESSION  IMPRESSION:      1. NO ULTRASOUND EVIDENCE OF HEMODYNAMICALLY SIGNIFICANT STENOSIS ON EITHER  SIDE.     2. BILATERAL THYROID NODULES FOR WHICH FOLLOW-UP DEDICATED THYROID ULTRASOUND  IS RECOMMENDED, WHEN CLINICALLY FEASIBLE.            Complete ROS done and is as stated in HPI or otherwise negative  Past Medical History:   Diagnosis Date    History of kidney stones 1982      No past surgical history on file. Cannot display prior to admission medications because the patient has not been admitted in this contact. Allergies   Allergen Reactions    Amoxil [Amoxicillin] Not Reported This Time    Codeine Not Reported This Time      Social History   Substance Use Topics    Smoking status: Never Smoker    Smokeless tobacco: Not on file    Alcohol use No      Family History   Problem Relation Age of Onset    Adopted: Yes      Objective: There were no vitals taken for this visit. Temp (24hrs), Av.4 °F (36.9 °C), Min:97.4 °F (36.3 °C), Max:98.7 °F (37.1 °C)       Physical Exam:  General:    Alert, cooperative, no distress, appears stated age. Head:   Normocephalic, without obvious abnormality, atraumatic. Nose:  Nares normal. No drainage or sinus tenderness. Lungs:   Clear to auscultation bilaterally. No Wheezing or Rhonchi. No rales. Heart:   4/5 systolic murmur. Abdomen:   Soft, non-tender. Not distended. Bowel sounds normal.   Extremities: No cyanosis. No edema.  No clubbing  Skin:     Texture, turgor normal. No rashes or lesions. Not Jaundiced  Neurologic: Alert and oriented x 3, no focal deficits   Data Review:   Recent Results (from the past 24 hour(s))   MRSA SCREEN - PCR (NASAL)    Collection Time: 03/06/17  8:15 PM   Result Value Ref Range    Special Requests: NO SPECIAL REQUESTS      Culture result:        MRSA target DNA is not detected (presumptive not colonized with MRSA)   LIPID PANEL    Collection Time: 03/07/17  4:20 AM   Result Value Ref Range    LIPID PROFILE          Cholesterol, total 156 <200 MG/DL    Triglyceride 205 (H) 35 - 150 MG/DL    HDL Cholesterol 31 (L) 40 - 60 MG/DL    LDL, calculated 84 <100 MG/DL    VLDL, calculated 41 (H) 7.0 - 27.0 MG/DL    CHOL/HDL Ratio 5.0     HEMOGLOBIN A1C WITH EAG    Collection Time: 03/07/17  4:20 AM   Result Value Ref Range    Hemoglobin A1c 4.9 4.8 - 6.0 %    Est. average glucose Cannot be calulated mg/dL   T4, FREE    Collection Time: 03/07/17  4:20 AM   Result Value Ref Range    T4, Free 1.0 0.78 - 1.46 NG/DL   TSH 3RD GENERATION    Collection Time: 03/07/17  4:20 AM   Result Value Ref Range    TSH 1.386 0.358 - 3.740 uIU/mL     Imaging /Procedures /Studies     Assessment and Plan:   There are no active hospital problems to display for this patient. PLAN  Admit to Cardiology for MV eval.   Continue treatment for sinusitis - doxycycline d1/7  Follow blood cultures - treat as appropriate if positive pending eval of MV mass  Continue full dose lovenox - would consider discontinuation if MV mass is not considered vegetative  Hospitalist will transfer care. Please call again if needed.  Thank you  Code Status: Full code      Signed By: Griffin Etienne DO     March 7, 2017

## 2017-03-07 NOTE — CONSULTS
[facility] Cardiology Consult                Date of  Admission: No admission date for patient encounter. Primary Care Physician: Dr. Niles Mai  Primary Cardiologist: Evie  Referring Physician: Dr. Renee Griffith Physician: Dr. Ailyn Izquierdo    CC/Reason for consult:TIA/MV vegetation/MACRUS      Satnam Avalos is a 50 y.o. male with prior h/o HTN (no meds), GERD, recurrent herpes infection of left eye (on acyclovir), BRITTNEY (never got fitting appt/CPAP several years ago), and murmur ([facility] ago he had a murmur but never had any workup). Patient presented to [facility] with not feeling well for several weeks with insomnia/dry mouth. The the evening prior to admission c/o tinglin gin left side of face down left arm that lasted for 40 mins. Seen by MD at work and referred to ED. He denies any speech problems or facial droop. In ED, CT head with no acute findings, his B/P was 176/99, all labs unremarkable. Hospital med admitted patient for poss TIA vs CVA. MRI obtained and small vessel dx but no acute CVA, carotid US OK. Due to murmur an echo was ordered. Echo showed preserved LV function with EF 55-60%, no WMA, MV with  Moderate prolapse involving the posterior leaflet. There is some nodular appearance of the posterior leaflet and cannot excluded vegetation. Patient would benefit from MARCUS to better assess pathology of mitral posterior leaflet. There was moderate to severe regurgitation. The regurgitant jet was eccentric. Currently patient denies any chest pain, heart palpitations but reports chronic SOB for years that is worse with exertion with dizziness        Diagnosis    Mitral regurgitation    Essential hypertension    TIA (transient ischemic attack)    Shortness of breath    BRITTNEY (obstructive sleep apnea)    GERD (gastroesophageal reflux disease)       Past Medical History:   Diagnosis Date    History of kidney stones 1982      No past surgical history on file.   Allergies   Allergen Reactions  Amoxil [Amoxicillin] Not Reported This Time    Codeine Not Reported This Time      Family History   Problem Relation Age of Onset    Adopted: Yes        No current facility-administered medications for this encounter. No current outpatient prescriptions on file. Facility-Administered Medications Ordered in Other Encounters   Medication Dose Route Frequency    enoxaparin (LOVENOX) injection 120 mg  120 mg SubCUTAneous Q12H    saline peripheral flush soln 5 mL  5 mL InterCATHeter PRN    sodium chloride (NS) flush 5-10 mL  5-10 mL IntraVENous Q8H    sodium chloride (NS) flush 5-10 mL  5-10 mL IntraVENous PRN    aspirin (ASPIRIN) tablet 325 mg  325 mg Oral DAILY    senna-docusate (PERICOLACE) 8.6-50 mg per tablet 2 Tab  2 Tab Oral QHS       Review of Systems   Constitution: Negative for diaphoresis, weakness and malaise/fatigue. HENT: Negative for congestion and headaches. Cardiovascular: Negative for chest pain, claudication, cyanosis, dyspnea on exertion, irregular heartbeat, leg swelling, near-syncope, orthopnea, palpitations, paroxysmal nocturnal dyspnea and syncope. Respiratory: Positive for shortness of breath (chronic for 1-2 years). Negative for cough and wheezing. Endocrine: Negative for cold intolerance and heat intolerance. Hematologic/Lymphatic: Does not bruise/bleed easily. Skin: Negative for nail changes. Neurological: Positive for dizziness. + left arm/left side of neck          Physical Exam  There were no vitals filed for this visit.     Physical Exam:  General: Well Developed, Well Nourished, No Acute Distress  HEENT: pupils equal and round, no abnormalities noted  Neck: supple, no JVD, no carotid bruits  Heart: S1S2 with RRR with holosystolic murmur that radiates to left axilla  Lungs: Clear throughout auscultation bilaterally without adventitious sounds  Abd: soft, nontender, nondistended, with good bowel sounds  Ext: warm, no edema, calves supple/nontender, pulses 2+ bilaterally  Skin: warm and dry  Psychiatric: Normal mood and affect  Neurologic: Alert and oriented X 3      Cardiographics    Telemetry: NSR  ECG: Normal sinus rhythm  Normal ECG   Echocardiogram:  Echo showed preserved LV function with EF 55-60%, no WMA, MV with  Moderate prolapse involving the posterior leaflet. There is some nodular appearance of the posterior leaflet and cannot excluded vegetation. Patient would benefit from MARCUS to better assess pathology of mitral posterior leaflet. There was moderate to severe regurgitation. The regurgitant jet was eccentric. Labs:   Recent Labs      03/07/17   0420  03/06/17   1453  03/06/17   1447  03/06/17   1445   NA   --    --   143   --    K   --    --   3.5   --    BUN   --    --   9   --    CREA   --    --   1.12   --    GLU   --    --   93   --    WBC   --    --   5.9   --    HGB   --    --   15.4   --    HCT   --    --   43.7   --    PLT   --    --   161   --    INR   --   1.1   --    --    TGL  205*   --    --    --    CHOL  156   --    --    --    HDL  31*   --    --    --    CHHD  5.0   --    --    --    LDLC  84   --    --    --    VLDL  41*   --    --    --    TNIPOC   --    --    --   0.01       Lab Results   Component Value Date/Time    Cholesterol, total 156 03/07/2017 04:20 AM    HDL Cholesterol 31 03/07/2017 04:20 AM    LDL, calculated 84 03/07/2017 04:20 AM    VLDL, calculated 41 03/07/2017 04:20 AM    Triglyceride 205 03/07/2017 04:20 AM    CHOL/HDL Ratio 5.0 03/07/2017 04:20 AM       Recent Labs      03/06/17   1445   TNIPOC  0.01       All Cardiac Markers in the last 24 hours:  No results found for: JOSUÉ Fagan      Assessment/Plan:      Active Problems:    Mitral regurgitation (3/7/2017)- echo yesterday showed preserved LV function with EF 55-60%, no WMA, MV with moderate prolapse involving the posterior leaflet. There is some nodular appearance of the posterior leaflet and cannot excluded vegetation.  There was moderate to severe regurgitation. Will order MARCUS tomorrow [facility] to better assess pathology of mitral posterior leaflet. Essential hypertension (3/7/2017)- will follow; on no meds      TIA (transient ischemic attack) (3/7/2017)- per primary team      Shortness of breath (3/7/2017)- been chronic for years; will reassess post MARCUS      BRITTNEY (obstructive sleep apnea) (3/7/2017)- needs repeat outpatient study and CPAP      GERD (gastroesophageal reflux disease) (3/7/2017)- worse over last several weeks. Per primary team.       Thank you very much for this referral. We appreciate the opportunity to participate in this patient's care. We will follow along with above stated plan. Saskia Antonio NP  Consulting MD: Dr. Enrique Deleon    Patient seen and examined by me. Agree with above note by physician extender.   Key findings are:  Severe MR by TTE  CV- RRR iii/vi at apex  Lungs- Clear bilaterally  Abdomen- soft, non-tender, normal bowel sounds  Extremities- no edema    Plan: MARCUS and probable Rimma Medina MD

## 2017-03-07 NOTE — PROGRESS NOTES
Problem: Mobility Impaired (Adult and Pediatric)  Goal: *Acute Goals and Plan of Care (Insert Text)  No goals set, as pt at baseline for functional mobility. Outcome: Progressing Towards Goal      PHYSICAL THERAPY: INITIAL ASSESSMENT 3/7/2017  OBSERVATION: Hospital Day: 2  Payor: Thomas Joseph / Plan: [place] [facility] / Product Type: PPO /      NAME/AGE/GENDER: Leena Kellogg is a 50 y.o. male           PRIMARY DIAGNOSIS: tia vs cva <principal problem not specified> <principal problem not specified>        ICD-10: Treatment Diagnosis:       · Other abnormalities of gait and mobility (R26.89)   Precaution/Allergies:  Amoxil [amoxicillin] and Codeine       ASSESSMENT:      Mr. Echo Becerra presents with signs and symptoms of CVA v. TIA. Upon entry, pt sitting edge of bed. Pt agreeable to ambulate in hallway with therapy. While discussing pt's condition with therapy, pt remained standing in room. Mobility deficits not present. Pt reports shortness of breath after climbing a flight of stairs at work. Pt appears to be at baseline level of mobility. Encouraged pt to pursue and make healthy lifestyle choices in regards to management of what appears to be sleep apnea. This section established at most recent assessment   PROBLEM LIST (Impairments causing functional limitations): 1. none noted    INTERVENTIONS PLANNED: (Benefits and precautions of physical therapy have been discussed with the patient.)  1. no interventions planned      TREATMENT PLAN: Frequency/Duration: none  Rehabilitation Potential For Stated Goals: no goals set      RECOMMENDED REHABILITATION/EQUIPMENT: (at time of discharge pending progress): None. HISTORY:   History of Present Injury/Illness (Reason for Referral):  Possible CVA or TIA  Past Medical History/Comorbidities:   Mr. Echo Becerra  has a past medical history of History of kidney stones (1982). Mr. Echo Becerra  has no past surgical history on file.   Social History/Living Environment:   Home Environment: Private residence  One/Two Story Residence: Two story  Living Alone: No  Support Systems: Spouse/Significant Other/Partner  Patient Expects to be Discharged to[de-identified] Private residence  Current DME Used/Available at Home: None  Prior Level of Function/Work/Activity:  Independent with ambulation and ADLs      Number of Personal Factors/Comorbidities that affect the Plan of Care: 0: LOW COMPLEXITY   EXAMINATION:   Most Recent Physical Functioning:   Gross Assessment:  AROM: Generally decreased, functional  Strength: Generally decreased, functional  Coordination: Generally decreased, functional               Posture:  Posture (WDL): Within defined limits  Balance:  Sitting: Intact  Standing: Intact Bed Mobility:  Rolling: Independent  Supine to Sit: Supervision  Sit to Supine: Supervision  Wheelchair Mobility:     Transfers:  Sit to Stand: Independent  Stand to Sit: Independent  Gait:         Observation/Orthostatic Postural Assessment:          Intact   Body Structures Involved:  1. Lungs  2. None Body Functions Affected:  1. Respiratory Activities and Participation Affected:  1. None   Number of elements that affect the Plan of Care: 1-2: LOW COMPLEXITY   CLINICAL PRESENTATION:   Presentation: Stable and uncomplicated: LOW COMPLEXITY   CLINICAL DECISION MAKIN[address] [place] [address]-PAC 6 Clicks   Basic Mobility Inpatient Short Form  How much difficulty does the patient currently have. .. Unable A Lot A Little None   1. Turning over in bed (including adjusting bedclothes, sheets and blankets)? [ ] 1   [ ] 2   [ ] 3   [X] 4   2. Sitting down on and standing up from a chair with arms ( e.g., wheelchair, bedside commode, etc.)   [ ] 1   [ ] 2   [ ] 3   [X] 4   3. Moving from lying on back to sitting on the side of the bed? [ ] 1   [ ] 2   [ ] 3   [X] 4   How much help from another person does the patient currently need. .. Total A Lot A Little None   4.   Moving to and from a bed to a chair (including a wheelchair)? [ ] 1   [ ] 2   [ ] 3   [X] 4   5. Need to walk in hospital room? [ ] 1   [ ] 2   [ ] 3   [X] 4   6. Climbing 3-5 steps with a railing? [ ] 1   [ ] 2   [ ] 3   [X] 4   © 2007, Trustees of [address] [place] [address] [address] [address], under license to [employer]. All rights reserved    Score:  Initial: 24 Most Recent: X (Date: -- )     Interpretation of Tool:  Represents activities that are increasingly more difficult (i.e. Bed mobility, Transfers, Gait). Score 24 23 22-20 19-15 14-10 9-7 6       Modifier CH CI CJ CK CL CM CN         · Mobility - Walking and Moving Around:               - CURRENT STATUS:    CH - 0% impaired, limited or restricted               - GOAL STATUS:           CH - 0% impaired, limited or restricted               - D/C STATUS:                       CH - 0% impaired, limited or restricted  Payor: BLUE [employer] / [place]: [place] [employer] [address] / Product Type: PPO /       Medical Necessity:     · none. Reason for Services/Other Comments:  · no services indicated.    Use of outcome tool(s) and clinical judgement create a POC that gives a: Clear prediction of patient's progress: LOW COMPLEXITY                 TREATMENT:   (In addition to Assessment/Re-Assessment sessions the following treatments were rendered)   Pre-treatment Symptoms/Complaints:  No complaints  Pain: Initial:   Pain Intensity 1: 0  Post Session:  0      Assessment/Reassessment only, no treatment provided today     Braces/Orthotics/Lines/Etc:   · O2 Device: Room air  Treatment/Session Assessment:    · Response to Treatment:  agreeable  · Interdisciplinary Collaboration:  · Physical Therapist  · Occupational Therapist  · Registered Nurse  · After treatment position/precautions:  · Supine in bed  · Bed/Chair-wheels locked  · Bed in low position  · Caregiver at bedside  · Call light within reach  · RN notified  · Compliance with Program/Exercises: compliant all of the time.  · Recommendations/Intent for next treatment session: \"Next visit will focus on no further treatments indicated. Pt encouraged to continue good health habits, such as following up with sleep study. \".   Total Treatment Duration:  PT Patient Time In/Time Out  Time In: 0900  Time Out: Saida 5, PT

## 2017-03-07 NOTE — PROGRESS NOTES
TRANSFER - OUT REPORT:    Verbal report given to Luke RN(name) on Luis Angel Russ  being transferred to [facility](unit) for routine progression of care       Report consisted of patients Situation, Background, Assessment and   Recommendations(SBAR). Information from the following report(s) SBAR, ED Summary, Procedure Summary, Intake/Output, MAR and Recent Results was reviewed with the receiving nurse. Lines:   Peripheral IV 03/06/17 Right Antecubital (Active)   Site Assessment Clean, dry, & intact 3/7/2017  8:52 AM   Phlebitis Assessment 0 3/7/2017  8:52 AM   Infiltration Assessment 0 3/7/2017  8:52 AM   Dressing Status Clean, dry, & intact 3/7/2017  8:52 AM   Dressing Type Tape;Transparent 3/7/2017  8:52 AM   Hub Color/Line Status Pink;Flushed;Patent 3/7/2017  8:52 AM   Action Taken Dressing reinforced 3/6/2017  7:52 PM   Alcohol Cap Used No 3/7/2017  8:52 AM        Opportunity for questions and clarification was provided.

## 2017-03-07 NOTE — H&P
Primary Care Physician: Dr. Sher Kennedy  Primary Cardiologist: Evie  Referring Physician: Dr. Taye Almaguer Physician: Dr. Magy Figueroa     CC/Reason for consult:TIA/MV vegetation/MARCUS        Gilson España is a 50 y.o. male with prior h/o HTN (no meds), GERD, recurrent herpes infection of left eye (on acyclovir), BRITTNEY (never got fitting appt/CPAP several years ago), and murmur (told by [facility] TOMASA ROSADO 1-2 years ago he had a murmur but never had any workup). Patient presented to [facility] with not feeling well for several weeks with insomnia/dry mouth. The the evening prior to admission c/o tinglin gin left side of face down left arm that lasted for 40 mins. Seen by MD at work and referred to ED. He denies any speech problems or facial droop. In ED, CT head with no acute findings, his B/P was 176/99, all labs unremarkable. Hospital med admitted patient for poss TIA vs CVA. MRI obtained and small vessel dx but no acute CVA, carotid US OK. Due to murmur an echo was ordered. Echo showed preserved LV function with EF 55-60%, no WMA, MV with  Moderate prolapse involving the posterior leaflet. There is some nodular appearance of the posterior leaflet and cannot excluded vegetation. Patient would benefit from MARCUS to better assess pathology of mitral posterior leaflet. There was moderate to severe regurgitation. The regurgitant jet was eccentric. Currently patient denies any chest pain, heart palpitations but reports chronic SOB for years that is worse with exertion with dizziness               Diagnosis    Mitral regurgitation    Essential hypertension    TIA (transient ischemic attack)    Shortness of breath    BRITTNEY (obstructive sleep apnea)    GERD (gastroesophageal reflux disease)              Past Medical History:   Diagnosis Date    History of kidney stones 1982      History reviewed. No pertinent surgical history.        Allergies   Allergen Reactions    Amoxil [Amoxicillin] Not Reported This Time    Codeine Not Reported This Time            Family History   Problem Relation Age of Onset    Adopted: Yes                Current Facility-Administered Medications   Medication Dose Route Frequency    enoxaparin (LOVENOX) injection 120 mg 120 mg SubCUTAneous Q12H    saline peripheral flush soln 5 mL 5 mL InterCATHeter PRN    sodium chloride (NS) flush 5-10 mL 5-10 mL IntraVENous Q8H    sodium chloride (NS) flush 5-10 mL 5-10 mL IntraVENous PRN    aspirin (ASPIRIN) tablet 325 mg 325 mg Oral DAILY    senna-docusate (PERICOLACE) 8.6-50 mg per tablet 2 Tab 2 Tab Oral QHS         Review of Systems   Constitution: Negative for diaphoresis, weakness and malaise/fatigue. HENT: Negative for congestion and headaches. Cardiovascular: Negative for chest pain, claudication, cyanosis, dyspnea on exertion, irregular heartbeat, leg swelling, near-syncope, orthopnea, palpitations, paroxysmal nocturnal dyspnea and syncope. Respiratory: Positive for shortness of breath (chronic for 1-2 years). Negative for cough and wheezing. Endocrine: Negative for cold intolerance and heat intolerance. Hematologic/Lymphatic: Does not bruise/bleed easily. Skin: Negative for nail changes. Neurological: Positive for dizziness.    + left arm/left side of neck            Physical Exam         Vitals:     03/07/17 1052 03/07/17 1152 03/07/17 1252 03/07/17 1531   BP: 141/77 142/84 (!) 144/93 158/88   Pulse: 71 77 84 71   Resp: 19 14 (!) 46 16   Temp:   98.7 °F (37.1 °C)   97.4 °F (36.3 °C)   SpO2: 92% 95% 93% (!) 65%   Weight:           Height:                 Physical Exam:  General: Well Developed, Well Nourished, No Acute Distress  HEENT: pupils equal and round, no abnormalities noted  Neck: supple, no JVD, no carotid bruits  Heart: S1S2 with RRR with holosystolic murmur that radiates to left axilla  Lungs: Clear throughout auscultation bilaterally without adventitious sounds  Abd: soft, nontender, nondistended, with good bowel sounds  Ext: warm, no edema, calves supple/nontender, pulses 2+ bilaterally  Skin: warm and dry  Psychiatric: Normal mood and affect  Neurologic: Alert and oriented X 3        Cardiographics     Telemetry: NSR  ECG: Normal sinus rhythm Normal ECG   Echocardiogram:  Echo showed preserved LV function with EF 55-60%, no WMA, MV with  Moderate prolapse involving the posterior leaflet. There is some nodular appearance of the posterior leaflet and cannot excluded vegetation. Patient would benefit from MARCUS to better assess pathology of mitral posterior leaflet. There was moderate to severe regurgitation. The regurgitant jet was eccentric.     Labs:          Recent Labs      03/07/17  0420 03/06/17  1453 03/06/17  1447 03/06/17  1445   NA --  --  143 --    K --  --  3.5 --    BUN --  --  9 --    CREA --  --  1.12 --    GLU --  --  93 --    WBC --  --  5.9 --    HGB --  --  15.4 --    HCT --  --  43.7 --    PLT --  --  161 --    INR --  1.1 --  --    * --  --  --    CHOL 156 --  --  --    HDL 31* --  --  --    CHHD 5.0 --  --  --    LDLC 84 --  --  --    VLDL 41* --  --  --    TNIPOC --  --  --  0.01               Lab Results   Component Value Date/Time     Cholesterol, total 156 03/07/2017 04:20 AM     HDL Cholesterol 31 03/07/2017 04:20 AM     LDL, calculated 84 03/07/2017 04:20 AM     VLDL, calculated 41 03/07/2017 04:20 AM     Triglyceride 205 03/07/2017 04:20 AM     CHOL/HDL Ratio 5.0 03/07/2017 04:20 AM             Recent Labs      03/06/17  1445   TNIPOC 0.01         All Cardiac Markers in the last 24 hours: No results found for: JOSUÉ Garcia        Assessment/Plan:       Active Problems:  Mitral regurgitation (3/7/2017)- echo yesterday showed preserved LV function with EF 55-60%, no WMA, MV with moderate prolapse involving the posterior leaflet. There is some nodular appearance of the posterior leaflet and cannot excluded vegetation. There was moderate to severe regurgitation.  Will order MARCUS tomorrow [facility] to better assess pathology of mitral posterior leaflet.     Essential hypertension (3/7/2017)- will follow; on no meds     TIA (transient ischemic attack) (3/7/2017)- per primary team     Shortness of breath (3/7/2017)- been chronic for years; will reassess post MARCUS     BRITTNEY (obstructive sleep apnea) (3/7/2017)- needs repeat outpatient study and CPAP     GERD (gastroesophageal reflux disease) (3/7/2017)- worse over last several weeks. Per primary team.         Thank you very much for this referral. We appreciate the opportunity to participate in this patient's care. We will follow along with above stated plan.     Jimi Kemp NP  Consulting MD: Dr. Erickson Camargo       Patient seen and examined by me. Agree with above note by physician extender. Key findings are: Severe MR by TTE  CV- RRR iii/vi at apex  Lungs- Clear bilaterally  Abdomen- soft, non-tender, normal bowel sounds  Extremities- no edema     Plan: MARCUS and probable LHC        Garth Lott MD      ** Addendum:  Patient transferred to [facility] for MARCUS and LHC.     Jyoti Byrd NP

## 2017-03-07 NOTE — PROGRESS NOTES
Preform a BP in both arms per orders. Left arm BP was 167/99 at rest, right arm is 164/92 at rest. Dr. Mary Anne Lux notified.

## 2017-03-07 NOTE — IP AVS SNAPSHOT
[address] 
[phone] Patient: Ruby Castillo MRN: [id number] CSB:[birth year] You are allergic to the following Allergen Reactions Amoxil (Amoxicillin) Other (comments) Upset stomach and diarrhea Codeine Nausea and Vomiting Recent Documentation Height Weight BMI Smoking Status 1.778 m 116.1 kg 36.73 kg/m2 Never Smoker Emergency Contacts Name Discharge Info Relation Home Work Mobile Ladonna Blanton  Spouse [3] [phone] About your hospitalization You were admitted on:  March 7, 2017 You last received care in the:  [facility] 3 TELEMETRY You were discharged on:  March 8, 2017 Unit phone number:  [phone] Why you were hospitalized Your primary diagnosis was:  Not on File Your diagnoses also included:  Mitral Insufficiency Providers Seen During Your Hospitalizations Provider Role Specialty Primary office phone Paty Arauz MD Attending Provider Cardiology [phone] Your Primary Care Physician (PCP) Primary Care Physician Office Phone Office Fax [address] [phone] Follow-up Information Follow up With Details Comments Contact Info Juan Rodríguez NP  As needed [address] Saul Renteria 
[phone] [address] [place] [address]  Office will call with follow up appt  [address] [place] [address] [facility] 
[phone] Current Discharge Medication List  
  
START taking these medications Dose & Instructions Dispensing Information Comments Morning Noon Evening Bedtime  
 doxycycline 100 mg capsule Commonly known as:  VIBRAMYCIN Your next dose is: Today, Tomorrow Other:  _________ Dose:  100 mg Take 1 Cap by mouth every twelve (12) hours for 7 days. Quantity:  14 Cap Refills:  0 CONTINUE these medications which have CHANGED Dose & Instructions Dispensing Information Comments Morning Noon Evening Bedtime  
 tadalafil 5 mg tablet Commonly known as:  CIALIS What changed:   
- when to take this 
- reasons to take this Your next dose is: Today, Tomorrow Other:  _________ Dose:  5 mg Take 1 Tab by mouth daily. Quantity:  30 Tab Refills:  11 CONTINUE these medications which have NOT CHANGED Dose & Instructions Dispensing Information Comments Morning Noon Evening Bedtime  
 acyclovir 200 mg capsule Commonly known as:  ZOVIRAX Your next dose is: Today, Tomorrow Other:  _________ Take  by mouth daily. Refills:  0  
     
   
   
   
  
 ZANTAC 150 mg tablet Generic drug:  raNITIdine Your next dose is: Today, Tomorrow Other:  _________ Dose:  150 mg Take 150 mg by mouth DIALYSIS PRN for Indigestion. Refills:  0 Where to Get Your Medications Information on where to get these meds will be given to you by the nurse or doctor. ! Ask your nurse or doctor about these medications  
  doxycycline 100 mg capsule Discharge Instructions DISCHARGE SUMMARY from Nurse The following personal items are in your possession at time of discharge: 
 
Dental Appliances: None Visual Aid: At bedside, Glasses Home Medications: None Jewelry: None Clothing: At bedside, Footwear, Pants, Shirt, Socks, Undergarments Other Valuables: Cell Phone PATIENT INSTRUCTIONS: 
 
 
F-face looks uneven A-arms unable to move or move unevenly S-speech slurred or non-existent T-time-call 911 as soon as signs and symptoms begin-DO NOT go Back to bed or wait to see if you get better-TIME IS BRAIN. Warning Signs of HEART ATTACK Call 911 if you have these symptoms: 
? Chest discomfort. Most heart attacks involve discomfort in the center of the chest that lasts more than a few minutes, or that goes away and comes back. It can feel like uncomfortable pressure, squeezing, fullness, or pain. ? Discomfort in other areas of the upper body. Symptoms can include pain or discomfort in one or both arms, the back, neck, jaw, or stomach. ? Shortness of breath with or without chest discomfort. ? Other signs may include breaking out in a cold sweat, nausea, or lightheadedness. Don't wait more than five minutes to call [address]! Fast action can save your life. Calling 911 is almost always the fastest way to get lifesaving treatment. Emergency Medical Services staff can begin treatment when they arrive  up to an hour sooner than if someone gets to the hospital by car. The discharge information has been reviewed with the patient. The patient verbalized understanding. Discharge medications reviewed with the patient and appropriate educational materials and side effects teaching were provided. Cardiac Catheterization/Angiography Discharge Instructions *Check the puncture site frequently for swelling or bleeding. If you see any bleeding, lie down and apply pressure over the area with a clean town or washcloth. Notify your doctor for any redness, swelling, drainage or oozing from the puncture site. Notify your doctor for any fever or chills. *If the leg or arm with the puncture becomes cold, numb or painful, call [address] Cardiology at [phone] *Activity should be limited for the next 48 hours. Climb stairs as little as possible and avoid any stooping, bending or strenuous activity for 48 hours. No heavy lifting (anything over 10 pounds) for three days. *Do not drive for 48 hours. *You may resume your usual diet. Drink more fluids than usual. 
 
*Have a responsible person drive you home and stay with you for at least 24 hours after your heart catheterization/angiography. *You may remove the bandage from your Right Groin in 24 hours. You may shower in 24 hours. No tub baths, hot tubs or swimming for one week. Do not place any lotions, creams, powders, ointments over the puncture site for one week. You may place a clean band-aid over the puncture site each day for 5 days. Change this daily. Heart Valve Disease: Care Instructions Your Care Instructions Your heart is a muscular pump that has four chambers and four valves. The four valves are the mitral, aortic, tricuspid, and pulmonary valves. The valves open and close to keep blood flowing in the proper direction through your heart. When something is wrong with one of the valves, the blood cannot flow in and out of the heart properly. Problems with the heart valves can cause leaks (valve regurgitation) and blockages (valve stenosis). You can be born with heart valve disease, or it can develop over a number of years. Mild cases of heart valve disease may not cause problems, but more serious cases will weaken the heart and can lead to heart failure. Treatment with medicine can help relieve symptoms, but it will not fix the valve. You may need to have surgery to replace or repair the valve. Follow-up care is a key part of your treatment and safety. Be sure to make and go to all appointments, and call your doctor if you are having problems. It's also a good idea to know your test results and keep a list of the medicines you take. How can you care for yourself at home? · Take your medicines exactly as prescribed. Call your doctor if you think you are having a problem with your medicine. You will get more details on the specific medicines your doctor prescribes. · Eat a heart-healthy diet. For example, eat more fruits, vegetables, fish, lean meats, whole grains, and other high-fiber foods. Limit sodium, sugar, and alcohol. · Be active. Ask your doctor what type and level of exercise is safe for you. Walking is a good choice. You also may want to swim, bike, or do other activities. · Stay at a healthy weight. Lose weight if you need to. · Do not smoke. Smoking can cause more heart problems. If you need help quitting, talk to your doctor about stop-smoking programs and medicines. These can increase your chances of quitting for good. · Avoid colds and flu. Get a pneumococcal vaccine shot. If you have had one before, ask your doctor if you need another dose. Get a flu vaccine every year. · Take care of your teeth and gums. Get regular dental checkups. Good dental health is important because bacteria can spread from infected teeth and gums to the heart valves. · If you have an artificial valve, you may need to take antibiotics before you have certain dental or surgical procedures. When should you call for help? Call 911 anytime you think you may need emergency care. For example, call if: 
· You have severe trouble breathing. · You cough up pink, foamy mucus and you have trouble breathing. · You have symptoms of a heart attack. These may include: ¨ Chest pain or pressure, or a strange feeling in the chest. 
¨ Sweating. ¨ Shortness of breath. ¨ Nausea or vomiting. ¨ Pain, pressure, or a strange feeling in the back, neck, jaw, or upper belly or in one or both shoulders or arms. ¨ Lightheadedness or sudden weakness. ¨ A fast or irregular heartbeat.  
After you call 911, the  may tell you to chew 1 adult-strength or 2 to 4 low-dose aspirin. Wait for an ambulance. Do not try to drive yourself. · You have symptoms of a stroke. These may include: 
¨ Sudden numbness, tingling, weakness, or loss of movement in your face, arm, or leg, especially on only one side of your body. ¨ Sudden vision changes. ¨ Sudden trouble speaking. ¨ Sudden confusion or trouble understanding simple statements. ¨ Sudden problems with walking or balance. ¨ A sudden, severe headache that is different from past headaches. · You passed out (lost consciousness). Call your doctor now or seek immediate medical care if: 
· You have new or increased shortness of breath. · You are dizzy or lightheaded, or you feel like you may faint. · You gain 2 to 3 pounds or more over 2 days. · You have increased swelling in your legs, ankles, or feet. Watch closely for changes in your health, and be sure to contact your doctor if you have any problems. Where can you learn more? Go to http://karan-kecia.info/. Enter W157 in the search box to learn more about \"Heart Valve Disease: Care Instructions. \" Current as of: January 27, 2016 Content Version: 11.1 © 0380-2650 [employer]. Care instructions adapted under license by [employer] (which disclaims liability or warranty for this information). If you have questions about a medical condition or this instruction, always ask your healthcare professional. [place] [address] any warranty or liability for your use of this information. Discharge Orders None [email or web address] Announcement We are excited to announce that we are making your provider's discharge notes available to you in StandDesk. You will see these notes when they are completed and signed by the physician that discharged you from your recent hospital stay.   If you have any questions or concerns about any information you see in StandDesk, please call the Technical Machine Department where you were seen or reach out to your Primary Care Provider for more information about your plan of care. Introducing [facility]! Arnol Graham introduces FoodShootr patient portal. Now you can access parts of your medical record, email your doctor's office, and request medication refills online. 1. In your internet browser, go to [email or web address] 2. Click on the First Time User? Click Here link in the Sign In box. You will see the New Member Sign Up page. 3. Enter your FoodShootr Access Code exactly as it appears below. You will not need to use this code after youve completed the sign-up process. If you do not sign up before the expiration date, you must request a new code. · FoodShootr Access Code: [email or web address] Expires: 6/4/2017  2:33 PM 
 
4. Enter the last four digits of your Social Security Number (xxxx) and Date of Birth (mm/dd/yyyy) as indicated and click Submit. You will be taken to the next sign-up page. 5. Create a FoodShootr ID. This will be your FoodShootr login ID and cannot be changed, so think of one that is secure and easy to remember. 6. Create a FoodShootr password. You can change your password at any time. 7. Enter your Password Reset Question and Answer. This can be used at a later time if you forget your password. 8. Enter your e-mail address. You will receive e-mail notification when new information is available in [address] 9. Click Sign Up. You can now view and download portions of your medical record. 10. Click the Download Summary menu link to download a portable copy of your medical information. If you have questions, please visit the Frequently Asked Questions section of the FoodShootr website. Remember, FoodShootr is NOT to be used for urgent needs. For medical emergencies, dial 911. Now available from your iPhone and Android! General Information Please provide this summary of care documentation to your next provider. Patient Signature:  ____________________________________________________________ Date:  ____________________________________________________________  
  
[place] Provider Signature:  ____________________________________________________________ Date:  ____________________________________________________________

## 2017-03-07 NOTE — PROGRESS NOTES
Spiritual Care Assessment/Progress Notes    Sj Romero [id number]  [id number]    1965  50 y.o.  male    Patient Telephone Number: [phone] (home)   Restorationist Affiliation: [place]   Language: English   Extended Emergency Contact Information  Primary Emergency Contact: [address] Phone: [phone]  Relation: Spouse   There are no active problems to display for this patient. Date: 3/7/2017       Level of Restorationist/Spiritual Activity:  []         Involved in corina tradition/spiritual practice    []         Not involved in corina tradition/spiritual practice  []         Spiritually oriented    []         Claims no spiritual orientation    []         seeking spiritual identity  []         Feels alienated from Uatsdin practice/tradition  []         Feels angry about Uatsdin practice/tradition  []         Spirituality/Uatsdin tradition is a resource for coping at this time.   []         Not able to assess due to medical condition    Services Provided Today:  []         crisis intervention    []         reading Scriptures  [x]         spiritual assessment    []         prayer  []         empathic listening/emotional support  []         rites and rituals (cite in comments)  []         life review     []         Uatsdin support  []         theological development   []         advocacy  []         ethical dialog     []         blessing  []         bereavement support    []         support to family  []         anticipatory grief support   []         help with AMD  []         spiritual guidance    []         meditation      Spiritual Care Needs  []         Emotional Support  []         Spiritual/Restorationist Care  []         Loss/Adjustment  []         Advocacy/Referral                /Ethics  []         No needs expressed at               this time  []         Other: (note in               comments)  [address]  []         Follow up visits with pt/family  []         Provide materials  []         Schedule sacraments  []         Contact Community               Clergy  []         Follow up as needed  []         Other: (note in               comments)     Comments: Spiritual care assessment completed via chart review. Patient is listed as Regulo Herrera in corina and is a Full Code. Will follow as needed.   Kerrie Prasad

## 2017-03-07 NOTE — DISCHARGE INSTRUCTIONS

## 2017-03-07 NOTE — PROGRESS NOTES
Pt arrived to room. VSS. Pt oriented to room, wife at bedside. Pt has heart murmur. Pt voices no needs at this time.

## 2017-03-07 NOTE — IP AVS SNAPSHOT
Current Discharge Medication List  
  
Take these medications at their scheduled times Dose & Instructions Dispensing Information Comments Morning Noon Evening Bedtime  
 acyclovir 200 mg capsule Commonly known as:  ZOVIRAX Your next dose is: Today, Tomorrow Other:  ____________ Take  by mouth daily. Refills:  0  
     
   
   
   
  
 doxycycline 100 mg capsule Commonly known as:  VIBRAMYCIN Your next dose is: Today, Tomorrow Other:  ____________ Dose:  100 mg Take 1 Cap by mouth every twelve (12) hours for 7 days. Quantity:  14 Cap Refills:  0  
     
   
   
   
  
 tadalafil 5 mg tablet Commonly known as:  CIALIS Your next dose is: Today, Tomorrow Other:  ____________ Dose:  5 mg Take 1 Tab by mouth daily. Quantity:  30 Tab Refills:  11 Take these medications as needed Dose & Instructions Dispensing Information Comments Morning Noon Evening Bedtime ZANTAC 150 mg tablet Generic drug:  raNITIdine Your next dose is: Today, Tomorrow Other:  ____________ Dose:  150 mg Take 150 mg by mouth DIALYSIS PRN for Indigestion. Refills:  0 Where to Get Your Medications Information about where to get these medications is not yet available ! Ask your nurse or doctor about these medications  
  doxycycline 100 mg capsule

## 2017-03-07 NOTE — PROGRESS NOTES
TRANSFER - IN REPORT:    Verbal report received from [facility] RN(name) on Sj Romero  being received from ER(unit) for routine progression of care      Report consisted of patients Situation, Background, Assessment and   Recommendations(SBAR). Information from the following report(s) SBAR, Kardex, ED Summary, Intake/Output, MAR, Recent Results, Med Rec Status and Cardiac Rhythm NSR was reviewed with the receiving nurse. Opportunity for questions and clarification was provided. Assessment completed upon patients arrival to unit and care assumed.

## 2017-03-07 NOTE — DISCHARGE SUMMARY
Hospitalist Discharge Summary     Patient ID:  Graciela Hernandez  [id number]  50 y.o.  1965  Admit date: 3/6/2017  2:39 PM  Discharge date and time: 3/7/2017  Attending: Immanuel Campbell MD  PCP:  Paulino Zavaleta NP  Treatment Team: Attending Provider: Immanuel Campbell MD; Utilization Review: Nohemi Tierney, RN; Consulting Provider: Leslie Bradford MD    Principal Diagnosis <principal problem not specified>   Active Problems:    Mitral regurgitation (3/7/2017)      Essential hypertension (3/7/2017)      TIA (transient ischemic attack) (3/7/2017)      Shortness of breath (3/7/2017)      BRITTNEY (obstructive sleep apnea) (3/7/2017)      GERD (gastroesophageal reflux disease) (3/7/2017)             Hospital Course:  Please refer to the admission H&P for details of presentation. 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 Patient states he has been feeling generally unwell for the last several weeks and unable to sleep, has a very dry mouth. He is not on any meds for the HTN. And the murmur has never been investigated. Yesterday evening around 5pm he started experiencing a tingling and numbness in the left arm that went into the left side of his face. It came up his arm in a wave and then went down his arm in a wave but the numbness in the face did not resolve. It lasted about 40 min. Seen by doctor at his job this am because the left sided numbness bothered him and he was referred to our ER. In the ER he is concerning for tia vs cva. So hospitalist asked to admit. Admitted for TIA eval. MRI negative. Carotids w/o significant stenosis. Echocardiogram very abnormal - severe MR and ?mitral valve. Cardiology consulted with request that patient transfer downtown for MARCUS, ? LHC and valve replacement. Placed on treatment dose lovenox until this valvular mass is further evaluated.  significant Diagnostic Studies:   Echocardiogram   SUMMARY:    -  Left ventricle: Systolic function was normal. Ejection fraction was  estimated in the range of 55 % to 60 %. There were no regional wall motion  abnormalities. Wall thickness was at the upper limits of normal.    -  Left atrium: The atrium was moderately dilated. -  Atrial septum: Contrast injection was performed. There was no   right-to-left  shunt, with provocative maneuvers to increase right atrial pressure. -  Mitral valve: There was mild annular calcification. There was a moderate  prolapse involving the posterior leaflet. There is some nodular appearance of  the posterior leaflet and cannot excluded vegetation. Patient would benefit  from MARCUS to better assess pathology of mitral posterior leaflet. There was  moderate to severe regurgitation. The regurgitant jet was eccentric. RECOMMENDATIONS:  If clinically indicated, transesophageal echocardiography could provide  additional information. Labs: Results:       Chemistry Recent Labs      03/06/17   1447   GLU  93   NA  143   K  3.5   CL  105   CO2  28   BUN  9   CREA  1.12   CA  9.0   AGAP  10   AP  55   TP  7.2   ALB  4.0   GLOB  3.2   AGRAT  1.3      CBC w/Diff Recent Labs      03/06/17   1447   WBC  5.9   RBC  5.06   HGB  15.4   HCT  43.7   PLT  161   GRANS  53   LYMPH  36   EOS  3      Cardiac Enzymes No results for input(s): CPK, CKND1, ELISEO in the last 72 hours. No lab exists for component: CKRMB, TROIP   Coagulation Recent Labs      03/06/17   1453   INR  1.1       Lipid Panel Lab Results   Component Value Date/Time    Cholesterol, total 156 03/07/2017 04:20 AM    HDL Cholesterol 31 03/07/2017 04:20 AM    LDL, calculated 84 03/07/2017 04:20 AM    VLDL, calculated 41 03/07/2017 04:20 AM    Triglyceride 205 03/07/2017 04:20 AM    CHOL/HDL Ratio 5.0 03/07/2017 04:20 AM      BNP No results for input(s): BNPP in the last 72 hours.    Liver Enzymes Recent Labs      03/06/17 1447   TP  7.2   ALB  4.0   AP  55   SGOT  30      Thyroid Studies Lab Results   Component Value Date/Time    TSH 1.386 03/07/2017 04:20 AM            Discharge Exam:  Visit Vitals    /88 (BP 1 Location: Left arm)    Pulse 71    Temp 97.4 °F (36.3 °C)    Resp 16    Ht 5' 10\" (1.778 m)    Wt 118.5 kg (261 lb 3.2 oz)    SpO2 (!) 65%    BMI 37.48 kg/m2     General appearance: alert, cooperative, no distress, appears stated age  Lungs: clear to auscultation bilaterally  Heart: regular rate and rhythm, S1, S2 KKZOEW,3/5 systolic   Abdomen: soft, non-tender.  Bowel sounds normal. No masses,  no organomegaly  Extremities: no cyanosis or edema  Neurologic: Grossly aubrey    lTime spent to discharge patient 35 minutes  Signed:  Whit Alvarenga DO  3/7/2017  5:28 PM

## 2017-03-07 NOTE — CONSULTS
[address] Cardiology Consult                Date of  Admission: 3/6/2017  2:39 PM     Primary Care Physician: Dr. Den Tom  Primary Cardiologist: Evie  Referring Physician: Dr. Dempsey Search Physician: Dr. Leisa Blankenship    CC/Reason for consult:TIA/MV vegetation/MARCUS      Tere Lacey is a 50 y.o. male with prior h/o HTN (no meds), GERD, recurrent herpes infection of left eye (on acyclovir), BRITTNEY (never got fitting appt/CPAP several years ago), and murmur [facility] he had a murmur but never had any workup). Patient presented to [facility] with not feeling well for several weeks with insomnia/dry mouth. The the evening prior to admission c/o tinglin gin left side of face down left arm that lasted for 40 mins. Seen by MD at work and referred to ED. He denies any speech problems or facial droop. In ED, CT head with no acute findings, his B/P was 176/99, all labs unremarkable. Hospital med admitted patient for poss TIA vs CVA. MRI obtained and small vessel dx but no acute CVA, carotid US OK. Due to murmur an echo was ordered. Echo showed preserved LV function with EF 55-60%, no WMA, MV with  Moderate prolapse involving the posterior leaflet. There is some nodular appearance of the posterior leaflet and cannot excluded vegetation. Patient would benefit from MARCUS to better assess pathology of mitral posterior leaflet. There was moderate to severe regurgitation. The regurgitant jet was eccentric. Currently patient denies any chest pain, heart palpitations but reports chronic SOB for years that is worse with exertion with dizziness        Diagnosis    Mitral regurgitation    Essential hypertension    TIA (transient ischemic attack)    Shortness of breath    BRITTNEY (obstructive sleep apnea)    GERD (gastroesophageal reflux disease)       Past Medical History:   Diagnosis Date    History of kidney stones 1982      History reviewed. No pertinent surgical history.   Allergies   Allergen Reactions    Amoxil [Amoxicillin] Not Reported This Time    Codeine Not Reported This Time      Family History   Problem Relation Age of Onset    Adopted: Yes        Current Facility-Administered Medications   Medication Dose Route Frequency    enoxaparin (LOVENOX) injection 120 mg  120 mg SubCUTAneous Q12H    saline peripheral flush soln 5 mL  5 mL InterCATHeter PRN    sodium chloride (NS) flush 5-10 mL  5-10 mL IntraVENous Q8H    sodium chloride (NS) flush 5-10 mL  5-10 mL IntraVENous PRN    aspirin (ASPIRIN) tablet 325 mg  325 mg Oral DAILY    senna-docusate (PERICOLACE) 8.6-50 mg per tablet 2 Tab  2 Tab Oral QHS       Review of Systems   Constitution: Negative for diaphoresis, weakness and malaise/fatigue. HENT: Negative for congestion and headaches. Cardiovascular: Negative for chest pain, claudication, cyanosis, dyspnea on exertion, irregular heartbeat, leg swelling, near-syncope, orthopnea, palpitations, paroxysmal nocturnal dyspnea and syncope. Respiratory: Positive for shortness of breath (chronic for 1-2 years). Negative for cough and wheezing. Endocrine: Negative for cold intolerance and heat intolerance. Hematologic/Lymphatic: Does not bruise/bleed easily. Skin: Negative for nail changes. Neurological: Positive for dizziness.         + left arm/left side of neck          Physical Exam  Vitals:    03/07/17 1052 03/07/17 1152 03/07/17 1252 03/07/17 1531   BP: 141/77 142/84 (!) 144/93 158/88   Pulse: 71 77 84 71   Resp: 19 14 (!) 46 16   Temp:  98.7 °F (37.1 °C)  97.4 °F (36.3 °C)   SpO2: 92% 95% 93% (!) 65%   Weight:       Height:           Physical Exam:  General: Well Developed, Well Nourished, No Acute Distress  HEENT: pupils equal and round, no abnormalities noted  Neck: supple, no JVD, no carotid bruits  Heart: S1S2 with RRR with holosystolic murmur that radiates to left axilla  Lungs: Clear throughout auscultation bilaterally without adventitious sounds  Abd: soft, nontender, nondistended, with good bowel sounds  Ext: warm, no edema, calves supple/nontender, pulses 2+ bilaterally  Skin: warm and dry  Psychiatric: Normal mood and affect  Neurologic: Alert and oriented X 3      Cardiographics    Telemetry: NSR  ECG: Normal sinus rhythm  Normal ECG   Echocardiogram:  Echo showed preserved LV function with EF 55-60%, no WMA, MV with  Moderate prolapse involving the posterior leaflet. There is some nodular appearance of the posterior leaflet and cannot excluded vegetation. Patient would benefit from MARCUS to better assess pathology of mitral posterior leaflet. There was moderate to severe regurgitation. The regurgitant jet was eccentric. Labs:   Recent Labs      03/07/17   0420  03/06/17   1453  03/06/17   1447  03/06/17   1445   NA   --    --   143   --    K   --    --   3.5   --    BUN   --    --   9   --    CREA   --    --   1.12   --    GLU   --    --   93   --    WBC   --    --   5.9   --    HGB   --    --   15.4   --    HCT   --    --   43.7   --    PLT   --    --   161   --    INR   --   1.1   --    --    TGL  205*   --    --    --    CHOL  156   --    --    --    HDL  31*   --    --    --    CHHD  5.0   --    --    --    LDLC  84   --    --    --    VLDL  41*   --    --    --    TNIPOC   --    --    --   0.01       Lab Results   Component Value Date/Time    Cholesterol, total 156 03/07/2017 04:20 AM    HDL Cholesterol 31 03/07/2017 04:20 AM    LDL, calculated 84 03/07/2017 04:20 AM    VLDL, calculated 41 03/07/2017 04:20 AM    Triglyceride 205 03/07/2017 04:20 AM    CHOL/HDL Ratio 5.0 03/07/2017 04:20 AM       Recent Labs      03/06/17   1445   TNIPOC  0.01       All Cardiac Markers in the last 24 hours:  No results found for: JOSUÉ Haynes      Assessment/Plan:      Active Problems:    Mitral regurgitation (3/7/2017)- echo yesterday showed preserved LV function with EF 55-60%, no WMA, MV with moderate prolapse involving the posterior leaflet.  There is some nodular appearance of the posterior leaflet and cannot excluded vegetation. There was moderate to severe regurgitation. [place] [facility] [place] pathology of mitral posterior leaflet. Essential hypertension (3/7/2017)- will follow; on no meds      TIA (transient ischemic attack) (3/7/2017)- per primary team      Shortness of breath (3/7/2017)- been chronic for years; will reassess post MARCUS      BRITTNEY (obstructive sleep apnea) (3/7/2017)- needs repeat outpatient study and CPAP      GERD (gastroesophageal reflux disease) (3/7/2017)- worse over last several weeks. Per primary team.       Thank you very much for this referral. We appreciate the opportunity to participate in this patient's care. We will follow along with above stated plan. Chino Le NP  Consulting MD: Dr. Monica Leonard    Patient seen and examined by me. Agree with above note by physician extender.   Key findings are:  Severe MR by TTE  CV- RRR iii/vi at apex  Lungs- Clear bilaterally  Abdomen- soft, non-tender, normal bowel sounds  Extremities- no edema    Plan: MARCUS and probable Stacey Roland MD

## 2017-03-07 NOTE — PROGRESS NOTES
TRANSFER - OUT REPORT:    Verbal report given to brain(name) on Tere Lacey  being transferred to room 304 DTW(unit) for routine progression of care       Report consisted of patients Situation, Background, Assessment and   Recommendations(SBAR). Information from the following report(s) SBAR, Procedure Summary, Intake/Output and Recent Results was reviewed with the receiving nurse. Lines:   Peripheral IV 03/06/17 Right Antecubital (Active)   Site Assessment Clean, dry, & intact 3/7/2017  3:32 PM   Phlebitis Assessment 0 3/7/2017  3:32 PM   Infiltration Assessment 0 3/7/2017  3:32 PM   Dressing Status Clean, dry, & intact 3/7/2017  3:32 PM   Dressing Type Tape;Transparent 3/7/2017  3:32 PM   Hub Color/Line Status Capped 3/7/2017  3:32 PM   Action Taken Dressing reinforced 3/6/2017  7:52 PM   Alcohol Cap Used No 3/7/2017  8:52 AM        Opportunity for questions and clarification was provided.

## 2017-03-07 NOTE — PROGRESS NOTES
Problem: Dysphagia (Adult)  Goal: *Speech Goal: (INSERT TEXT)  NO SPEECH GOALS  SPEECH LANGUAGE PATHOLOGY: BEDSIDE SWALLOW NOTE: INITIAL ASSESSMENT AND DISCHARGE     NAME/AGE/GENDER: Myles Lui is a 50 y.o. male  DATE: 3/7/2017  PRIMARY DIAGNOSIS: tia vs cva       ICD-10: Treatment Diagnosis: Dysphagia Other, R13.19  INTERDISCIPLINARY COLLABORATION: Speech Therapist  PRECAUTIONS/ALLERGIES: Amoxil [amoxicillin] and Codeine   ASSESSMENT:   Based on the objective data described below, Mr. Brianda Marvin presents with no clinical s/sx of Dysphagia. Currently he is on a regular diet. Denies difficulty swallowing however he admits to having GERD but does not take his medication regularly. SLP encouraged patient to take GERD medication as prescribed. He was given p.o trials of thin liquids via cup and straw, puree, mixed consistencies and solids. No overt clinical s/sx of aspiration was observed. Patient was alert and oriented. No deficits in speech, language or cognition observed. Continue with current diet. No further ST is warranted. PLAN OF CARE:   Patient will benefit from skilled intervention to address the following impairments. RECOMMENDATIONS AND PLANNED INTERVENTIONS (Benefits and precautions of therapy have been discussed with the patient.):  · continue prescribed diet  · Liquids:  regular thin  MEDICATIONS:  · With liquid  COMPENSATORY STRATEGIES/MODIFICATIONS INCLUDING:  · None  OTHER RECOMMENDATIONS (including follow up treatment recommendations):   · None  RECOMMENDED DIET MODIFICATIONS DISCUSSED WITH:  · Family  · Patient  FREQUENCY/DURATION: No further ST is warranted. RECOMMENDED REHABILITATION/EQUIPMENT: (at time of discharge pending progress):   None. SUBJECTIVE:   \"I am doing better\"  History of Present Injury/Illness: Mr. Brianda Marvin  has a past medical history of History of kidney stones (1982). [facility] He also  has no past surgical history on file.    Present Symptoms: None   Pain Intensity 1: 0  Current Medications:   No current facility-administered medications on file prior to encounter. Current Outpatient Prescriptions on File Prior to Encounter   Medication Sig Dispense Refill    tadalafil (CIALIS) 5 mg tablet Take 1 Tab by mouth daily. 30 Tab 11    acyclovir (ZOVIRAX) 200 mg capsule Take  by mouth every four (4) hours (while awake). Current Dietary Status:  Regular/Thin           History of reflux:  YES   · Reflux medication:Prevacid  Social History/Home Situation:    Home Environment: Private residence  One/Two Story Residence: Two story  Living Alone: No  Support Systems: Spouse/Significant Other/Partner  Patient Expects to be Discharged to[de-identified] Private residence  Current DME Used/Available at Home: None      OBJECTIVE:   Respiratory Status:  Room air     CXR Results:N/A  MRI/CT Results:no acute abnormality  Oral Motor Structure/Speech:  Oral-Motor Structure/Motor Speech  Labial: No impairment  Dentition: Natural  Oral Hygiene: good  Lingual: No impairment  Velum: No impairment  Mandible: No impairment     Cognitive and Communication Status:  Neurologic State: Alert  Orientation Level: Oriented X4  Cognition: Follows commands  Perception: Appears intact  Perseveration: No perseveration noted  Safety/Judgement: Awareness of environment     BEDSIDE SWALLOW EVALUATION  Oral Assessment:  Oral Assessment  Labial: No impairment  Dentition: Natural  Oral Hygiene: good  Lingual: No impairment  Velum: No impairment  Mandible: No impairment  Gag Reflex: Present  P.O. Trials:  Patient Position: upright in bed     The patient was given teaspoon to tablespoon   amounts of the following:   Consistency Presented: Thin liquid; Solid;Puree;Mixed consistency  How Presented: Self-fed/presented;Cup/sip;Spoon;Straw     ORAL PHASE:  Bolus Acceptance: No impairment  Bolus Formation/Control: No impairment  Propulsion: No impairment     Oral Residue: None     PHARYNGEAL PHASE:     Laryngeal Elevation: Functional     Vocal Quality: No impairment           Pharyngeal Phase Characteristics: No impairment, issues, or problems      OTHER OBSERVATIONS:  Rate/bite size: WNL         Endurance: WNL            Comments: Tool Used: Dysphagia Outcome and Severity Scale (CHASITY)     Score Comments   Normal Diet  [X] 7 With no strategies or extra time needed   Functional Swallow  [ ] 6 May have mild oral or pharyngeal delay         Mild Dysphagia     [ ] 5 Which may require one diet consistency restricted (those who demonstrate penetration which is entirely cleared on MBS would be included)   Mild-Moderate Dysphagia  [ ] 4 With 1-2 diet consistencies restricted         Moderate Dysphagia  [ ] 3 With 2 or more diet consistencies restricted         Moderately Severe Dysphagia  [ ] 2 With partial PO strategies (trials with ST only)         Severe Dysphagia  [ ] 1 With inability to tolerate any PO safely            Score:  Initial: 7 Most Recent: X (Date: -- )   Interpretation of Tool: The Dysphagia Outcome and Severity Scale (CHASITY) is a simple, easy-to-use, 7-point scale developed to systematically rate the functional severity of dysphagia based on objective assessment and make recommendations for diet level, independence level, and type of nutrition.        Score 7 6 5 4 3 2 1   Modifier CH CI CJ CK CL CM CN   · Swallowing:               - CURRENT STATUS:           CH - 0% impaired, limited or restricted               - GOAL STATUS:                   CH - 0% impaired, limited or restricted               - D/C STATUS:                       [facility] - 0% impaired, limited or restricted  Payor: RAMIN SHAFFER / Plan: ANGELINA Yu / Product Type: PPO /       TREATMENT:         (In addition to Assessment/Re-Assessment sessions the following treatments were rendered)  Assessment/Reassessment only, no treatment provided today  MODALITIES: ORAL MOTOR  EXERCISES:                                                                                                                                                                       LARYNGEAL / PHARYNGEAL EXERCISES:                                                                                                                                      __________________________________________________________________________________________________  Safety:   After treatment position/precautions:  · Upright in Bed  Treatment Assessment:  No clinical s/sx of Dysphagia. Evaluation only. Total Treatment Duration:  Time In: 0845  Time Out: 0900     Lizzie Elizabeth. Clemente Coulter

## 2017-03-07 NOTE — PROGRESS NOTES
Problem: Self Care Deficits Care Plan (Adult)  Goal: *Acute Goals and Plan of Care (Insert Text)      OCCUPATIONAL THERAPY: Initial Assessment and Discharge 3/7/2017  OBSERVATION: Hospital Day: 2  Payor: Irene Woodard / Plan: [place] [employer] [facility] / Product Type: PPO /      NAME/AGE/GENDER: Zena Soto is a 50 y.o. male           PRIMARY DIAGNOSIS:  tia vs cva <principal problem not specified> <principal problem not specified>        ICD-10: Treatment Diagnosis:        · Generalized Muscle Weakness (M62.81)   Precautions/Allergies:         Amoxil [amoxicillin] and Codeine       ASSESSMENT:      Mr. Elisabeth Ferrer presents with above diagnosis. Pt was seen in ICU room. No deficits noted. Pt reports a mild numbness in face and not feeling well for the last 2 weeks. OT encourage pt to follow up with all recommendations from practitioners. Pt also reported that he has been diagnosed with sleep apnea but that is non compliant with using a machine. OT educated pt on importance of sleep apnea follow up. No further OT warranted. OCCUPATIONAL PROFILE AND HISTORY:   History of Present Injury/Illness (Reason for Referral):  Left arm numbness and tingling, resolved   Past Medical History/Comorbidities:   Mr. Elisabeth Ferrer  has a past medical history of History of kidney stones (1982). Mr. Elisabeth Ferrer  has no past surgical history on file.   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       Number of Personal Factors/Comorbidities that affect the Plan of Care: Brief history (0):  LOW COMPLEXITY   ASSESSMENT OF OCCUPATIONAL PERFORMANCE[de-identified]   Activities of Daily Living:           Basic ADLs (From Assessment) Complex ADLs (From Assessment)   Basic ADL  Feeding: Independent  Oral Facial Hygiene/Grooming: Independent  Bathing: Independent  Upper Body Dressing: Independent  Lower Body Dressing: Independent  Toileting: Independent     Grooming/Bathing/Dressing Activities of Daily Living     Cognitive Retraining  Safety/Judgement: Awareness of environment                 Functional Transfers  Toilet Transfer : Independent  Tub Transfer: Independent  Shower Transfer: Independent     Bed/Mat Mobility  Rolling: Independent  Supine to Sit: Supervision  Sit to Supine: Supervision  Sit to Stand: Independent          Most Recent Physical Functioning:   Gross Assessment:                  Posture:  Posture (WDL): Within defined limits  Balance:  Sitting: Intact  Standing: Intact Bed Mobility:  Rolling: Independent  Supine to Sit: Supervision  Sit to Supine: Supervision  Wheelchair Mobility:     Transfers:  Sit to Stand: Independent  Stand to Sit: Independent      Patient Vitals for the past 6 hrs:       BP BP Patient Position SpO2 Pulse   17 0452 150/83 - 93 % 73   17 0552 145/71 - 92 % 64   17 0852 149/79 At rest 91 % 76        Mental Status  Neurologic State: Alert  Orientation Level: Oriented X4  Cognition: Appropriate decision making  Perception: Appears intact  Perseveration: No perseveration noted  Safety/Judgement: Awareness of environment             Physical Skills Involved:  1. Sensation Cognitive Skills Affected (resulting in the inability to perform in a timely and safe manner): 1. none Psychosocial Skills Affected:  1. non compliance with health concerns    Number of elements that affect the Plan of Care: 1-3:  LOW COMPLEXITY   CLINICAL DECISION MAKIN [facility] Inpatient Short Form  How much help from another person does the patient currently need. .. Total A Lot A Little None   1.   Putting on and taking off regular lower body clothing?   [ ] 1 [ ] 2   [ ] 3   [X] 4   2. Bathing (including washing, rinsing, drying)? [ ] 1   [ ] 2   [ ] 3   [X] 4   3. Toileting, which includes using toilet, bedpan or urinal?   [ ] 1   [ ] 2   [ ] 3   [X] 4   4. Putting on and taking off regular upper body clothing?   [ ] 1   [ ] 2   [ ] 3   [X] 4   5. Taking care of personal grooming such as brushing teeth? [ ] 1   [ ] 2   [ ] 3   [X] 4   6. Eating meals? [ ] 1   [ ] 2   [ ] 3   [X] 4   © 2007, Trustees of [address] [place] [address] [address] [id number], under license to [employer]. All rights reserved    Score:  Initial: 24 Most Recent: X (Date: -- )     Interpretation of Tool:  Represents activities that are increasingly more difficult (i.e. Bed mobility, Transfers, Gait). Score 24 23 22-20 19-15 14-10 9-7 6       Modifier CH CI CJ CK CL CM CN         · Self Care:               - CURRENT STATUS:    CH - 0% impaired, limited or restricted               - GOAL STATUS:           CH - 0% impaired, limited or restricted               - D/C STATUS:                       CH - 0% impaired, limited or restricted  Payor: [employer] / Plan: [place] [address] / Product Type: PPO /       Medical Necessity:     · n/a. Reason for Services/Other Comments:  · n/a.    Use of outcome tool(s) and clinical judgement create a POC that gives a: LOW COMPLEXITY             TREATMENT:   (In addition to Assessment/Re-Assessment sessions the following treatments were rendered)      Pre-treatment Symptoms/Complaints:  Complaint of not feeling well x 2 weeks no complaint of pain  Pain: Initial:   Pain Intensity 1: 0 0 Post Session:  0      Assessment/Reassessment only, no treatment provided today     Braces/Orthotics/Lines/Etc:   · ICU monitors  · O2 Device: Room air  Treatment/Session Assessment:    · Response to Treatment:  Pt tolerated well with good understanding of education  · Interdisciplinary Collaboration:  · Physical Therapist  · Occupational Therapist  · Registered Nurse  · After treatment position/precautions:  · Supine in bed  · Bed/Chair-wheels locked  · Bed in low position  · Call light within reach  · RN notified  · Compliance with Program/Exercises: complaint  · Recommendations/Intent for next treatment session:  No further OT warranted  Total Treatment Duration:  OT Patient Time In/Time Out  Time In: 0915  Time Out: MADDI Hassan

## 2017-03-07 NOTE — ED NOTES
TRANSFER - OUT REPORT:    Verbal report given to Kimberlyn (name) on Puneet Stauffer  being transferred to (unit) for routine progression of care       Report consisted of patients Situation, Background, Assessment and   Recommendations(SBAR). Information from the following report(s) SBAR, ED Summary and Procedure Summary was reviewed with the receiving nurse. Lines:   Peripheral IV 03/06/17 Right Antecubital (Active)   Site Assessment Clean, dry, & intact 3/6/2017  2:49 PM   Phlebitis Assessment 0 3/6/2017  2:49 PM   Infiltration Assessment 0 3/6/2017  2:49 PM   Dressing Status Clean, dry, & intact 3/6/2017  2:49 PM       Peripheral IV 03/06/17 Right Antecubital (Active)   Site Assessment Clean, dry, & intact 3/6/2017  2:47 PM   Phlebitis Assessment 0 3/6/2017  2:47 PM   Infiltration Assessment 0 3/6/2017  2:47 PM   Dressing Status Clean, dry, & intact 3/6/2017  2:47 PM   Dressing Type Transparent 3/6/2017  2:47 PM        Opportunity for questions and clarification was provided.       Patient transported with:   Registered Nurse

## 2017-03-08 VITALS
SYSTOLIC BLOOD PRESSURE: 150 MMHG | HEART RATE: 95 BPM | DIASTOLIC BLOOD PRESSURE: 93 MMHG | RESPIRATION RATE: 18 BRPM | TEMPERATURE: 98.3 F | OXYGEN SATURATION: 95 % | BODY MASS INDEX: 36.65 KG/M2 | WEIGHT: 256 LBS | HEIGHT: 70 IN

## 2017-03-08 PROBLEM — I34.0 MITRAL INSUFFICIENCY: Status: ACTIVE | Noted: 2017-03-08

## 2017-03-08 PROCEDURE — B2151ZZ FLUOROSCOPY OF LEFT HEART USING LOW OSMOLAR CONTRAST: ICD-10-PCS | Performed by: INTERNAL MEDICINE

## 2017-03-08 PROCEDURE — C1769 GUIDE WIRE: HCPCS

## 2017-03-08 PROCEDURE — C1894 INTRO/SHEATH, NON-LASER: HCPCS

## 2017-03-08 PROCEDURE — 93460 R&L HRT ART/VENTRICLE ANGIO: CPT

## 2017-03-08 PROCEDURE — 77030004534 HC CATH ANGI DX INFN CARD -A

## 2017-03-08 PROCEDURE — 74011636320 HC RX REV CODE- 636/320: Performed by: INTERNAL MEDICINE

## 2017-03-08 PROCEDURE — C1760 CLOSURE DEV, VASC: HCPCS

## 2017-03-08 PROCEDURE — 74011250636 HC RX REV CODE- 250/636

## 2017-03-08 PROCEDURE — 4A023N8 MEASUREMENT OF CARDIAC SAMPLING AND PRESSURE, BILATERAL, PERCUTANEOUS APPROACH: ICD-10-PCS | Performed by: INTERNAL MEDICINE

## 2017-03-08 PROCEDURE — 74011250637 HC RX REV CODE- 250/637: Performed by: INTERNAL MEDICINE

## 2017-03-08 PROCEDURE — 74011250636 HC RX REV CODE- 250/636: Performed by: INTERNAL MEDICINE

## 2017-03-08 PROCEDURE — 77030029997 HC DEV COM RDL R BND TELE -B

## 2017-03-08 PROCEDURE — 99152 MOD SED SAME PHYS/QHP 5/>YRS: CPT

## 2017-03-08 PROCEDURE — 93312 ECHO TRANSESOPHAGEAL: CPT

## 2017-03-08 PROCEDURE — C1751 CATH, INF, PER/CENT/MIDLINE: HCPCS

## 2017-03-08 PROCEDURE — 77030004559 HC CATH ANGI DX SUPT CARD -B

## 2017-03-08 PROCEDURE — B246ZZ4 ULTRASONOGRAPHY OF RIGHT AND LEFT HEART, TRANSESOPHAGEAL: ICD-10-PCS | Performed by: INTERNAL MEDICINE

## 2017-03-08 PROCEDURE — 74011000250 HC RX REV CODE- 250: Performed by: INTERNAL MEDICINE

## 2017-03-08 PROCEDURE — 99153 MOD SED SAME PHYS/QHP EA: CPT

## 2017-03-08 PROCEDURE — B2111ZZ FLUOROSCOPY OF MULTIPLE CORONARY ARTERIES USING LOW OSMOLAR CONTRAST: ICD-10-PCS | Performed by: INTERNAL MEDICINE

## 2017-03-08 RX ORDER — FENTANYL CITRATE 50 UG/ML
25-100 INJECTION, SOLUTION INTRAMUSCULAR; INTRAVENOUS
Status: DISCONTINUED | OUTPATIENT
Start: 2017-03-08 | End: 2017-03-08 | Stop reason: HOSPADM

## 2017-03-08 RX ORDER — DOXYCYCLINE 100 MG/1
100 CAPSULE ORAL EVERY 12 HOURS
Qty: 14 CAP | Refills: 0 | OUTPATIENT
Start: 2017-03-08 | End: 2017-03-08

## 2017-03-08 RX ORDER — LIDOCAINE HYDROCHLORIDE 20 MG/ML
1-20 INJECTION, SOLUTION INFILTRATION; PERINEURAL ONCE
Status: COMPLETED | OUTPATIENT
Start: 2017-03-08 | End: 2017-03-08

## 2017-03-08 RX ORDER — DOXYCYCLINE 100 MG/1
100 CAPSULE ORAL EVERY 12 HOURS
Qty: 14 CAP | Refills: 0 | Status: SHIPPED | OUTPATIENT
Start: 2017-03-08 | End: 2017-03-15

## 2017-03-08 RX ORDER — HEPARIN SODIUM 200 [USP'U]/100ML
25 INJECTION, SOLUTION INTRAVENOUS CONTINUOUS
Status: DISCONTINUED | OUTPATIENT
Start: 2017-03-08 | End: 2017-03-08 | Stop reason: HOSPADM

## 2017-03-08 RX ORDER — MIDAZOLAM HYDROCHLORIDE 1 MG/ML
1-6 INJECTION, SOLUTION INTRAMUSCULAR; INTRAVENOUS
Status: DISCONTINUED | OUTPATIENT
Start: 2017-03-08 | End: 2017-03-08 | Stop reason: HOSPADM

## 2017-03-08 RX ADMIN — DOXYCYCLINE HYCLATE 100 MG: 100 CAPSULE ORAL at 21:09

## 2017-03-08 RX ADMIN — MIDAZOLAM HYDROCHLORIDE 1 MG: 1 INJECTION, SOLUTION INTRAMUSCULAR; INTRAVENOUS at 17:23

## 2017-03-08 RX ADMIN — LIDOCAINE HYDROCHLORIDE 40 MG: 20 INJECTION, SOLUTION INFILTRATION; PERINEURAL at 17:06

## 2017-03-08 RX ADMIN — ASPIRIN 325 MG ORAL TABLET 325 MG: 325 PILL ORAL at 08:18

## 2017-03-08 RX ADMIN — IOPAMIDOL 190 ML: 755 INJECTION, SOLUTION INTRAVENOUS at 17:31

## 2017-03-08 RX ADMIN — MIDAZOLAM HYDROCHLORIDE 2 MG: 1 INJECTION, SOLUTION INTRAMUSCULAR; INTRAVENOUS at 17:03

## 2017-03-08 RX ADMIN — HEPARIN SODIUM 25 ML/HR: 200 INJECTION, SOLUTION INTRAVENOUS at 16:52

## 2017-03-08 RX ADMIN — LIDOCAINE HYDROCHLORIDE 100 MG: 20 INJECTION, SOLUTION INFILTRATION; PERINEURAL at 17:03

## 2017-03-08 RX ADMIN — MIDAZOLAM HYDROCHLORIDE 3 MG: 1 INJECTION, SOLUTION INTRAMUSCULAR; INTRAVENOUS at 16:03

## 2017-03-08 RX ADMIN — Medication 5 ML: at 13:35

## 2017-03-08 RX ADMIN — DOXYCYCLINE HYCLATE 100 MG: 100 CAPSULE ORAL at 08:18

## 2017-03-08 NOTE — PROGRESS NOTES
Bedside and Verbal shift change report given to [facility]. Report included the following information SBAR, Kardex, MAR, Accordion and Recent Results.

## 2017-03-08 NOTE — ROUTINE PROCESS
TRANSFER - OUT REPORT:    MARCUS/RHC/LHC  Dr. Ruben Bosworth  RFV/RRA access    Versed 6mg total for both procedures  Pt has MR, diagnostic LHC  RFV closed with Perclose closure device  R band placed on right wrist at 1738 with 10ml air    Verbal report given to [facility]IMENA MONTOYA RN(name) on Maribell Caballero  being transferred to [facility](unit) for routine progression of care       Report consisted of patients Situation, Background, Assessment and   Recommendations(SBAR). Information from the following report(s) Procedure Summary was reviewed with the receiving nurse. Lines:   Peripheral IV 03/06/17 Right Antecubital (Active)   Site Assessment Clean, dry, & intact 3/8/2017 11:35 AM   Phlebitis Assessment 0 3/8/2017 11:35 AM   Infiltration Assessment 0 3/8/2017 11:35 AM   Dressing Status Clean, dry, & intact 3/8/2017 11:35 AM   Dressing Type Tape;Transparent 3/8/2017 11:35 AM   Hub Color/Line Status Patent 3/8/2017 11:35 AM   Action Taken Dressing reinforced 3/6/2017  7:52 PM   Alcohol Cap Used No 3/7/2017  8:52 AM        Opportunity for questions and clarification was provided.       Patient transported with:   Registered Nurse  Tech

## 2017-03-08 NOTE — PROGRESS NOTES
TRANSFER - OUT REPORT:    Verbal report given to [address] [place] [address] & [facility] on Rodrick Littlejohn  being transferred to cath lab for ordered procedure       Report consisted of patients Situation, Background, Assessment and   Recommendations(SBAR). Information from the following report(s) Kardex, Intake/Output and MAR was reviewed with the receiving nurse. Lines:   Peripheral IV 03/06/17 Right Antecubital (Active)   Site Assessment Clean, dry, & intact 3/8/2017 11:35 AM   Phlebitis Assessment 0 3/8/2017 11:35 AM   Infiltration Assessment 0 3/8/2017 11:35 AM   Dressing Status Clean, dry, & intact 3/8/2017 11:35 AM   Dressing Type Tape;Transparent 3/8/2017 11:35 AM   Hub Color/Line Status Patent 3/8/2017 11:35 AM   Action Taken Dressing reinforced 3/6/2017  7:52 PM   Alcohol Cap Used No 3/7/2017  8:52 AM        Opportunity for questions and clarification was provided.

## 2017-03-08 NOTE — PROGRESS NOTES
Report received from Geraldine Morataya Cath Lab RN. Procedural findings communicated. Intra procedural  medication administration reviewed. Progression of care discussed.      Patient received into [id number] [facility] 8 post sheath removal.     Access site without bleeding or swelling yes    Dressing dry and intact yes    Patient instructed to limit movement to right upper and lower extremity    Routine post procedural vital signs and site assessment initiated yes

## 2017-03-08 NOTE — PROCEDURES
Brief Cardiac Procedure Note    Patient: Maribell Caballero MRN: [id number]  SSN: [id number]    YOB: 1965  Age: 50 y.o. Sex: male      Date of Procedure: 3/8/2017     Pre-procedure Diagnosis: Mitral Valve Disorder    Post-procedure Diagnosis: Mitral Valve Disorder    Procedure: Transesophageal Echocardiogram    Brief Description of Procedure:     Performed By: Kavitha Conti MD     Assistants:     Anesthesia: Moderate Sedation    Estimated Blood Loss: Less than 10 mL      Specimens: None    Implants: None    Findings: + mvp and mr, not a veg    Complications: None    Recommendations: Continue medical therapy.     Signed By: Kavitha Conti MD     March 8, 2017

## 2017-03-08 NOTE — PROGRESS NOTES
Applied R-band to right wrist with 10 mL of air in band. Site without bleeding or hematoma. Capillary refill distal to the site was less than 2 seconds. Patient instructed to limit movement of affected wrist. Patient verbalized understanding. Right groin site covered with sterile tegaderm, noted to be clean, dry, and intact without bleeding or hematoma. Patient instructed to keep right leg straight and still and head on pillow. Patient verbalizes understanding.

## 2017-03-08 NOTE — PROCEDURES
Brief Cardiac Procedure Note    Patient: [facility] MRN: [id number]  SSN: [id number]    YOB: 1965  Age: 50 y.o. Sex: male      Date of Procedure: 3/8/2017     Pre-procedure Diagnosis: Mitral Valve Disorder    Post-procedure Diagnosis: Mitral Valve Disorder    Procedure: Right and Left Heart Catheterization    Brief Description of Procedure: via rra and rfv, + perclose    Performed By: Danay Hand MD     Assistants:     Anesthesia: Moderate Sedation    Estimated Blood Loss: Less than 10 mL      Specimens: None    Implants: None    Findings:   nml rhc  nml ef  + mr  nml cors    Complications: None    Recommendations: ?repair.     Signed By: Danay Hand MD     March 8, 2017

## 2017-03-08 NOTE — PROGRESS NOTES
TRANSFER - IN REPORT:    Verbal report received from LIZ Mayers from [facility] RN(name) on Jonatan Rai  being received from [facility](unit) for routine progression of care      Report consisted of patients Situation, Background, Assessment and   Recommendations(SBAR). Information from the following report(s) SBAR, Kardex, MAR, Accordion and Recent Results was reviewed with the receiving nurse. Opportunity for questions and clarification was provided. Assessment completed upon patients arrival to unit and care assumed. Dual skin assessment complete. Skin warm, dry and intact.

## 2017-03-08 NOTE — PROGRESS NOTES
Patient received into lab for MARCUS. Patient was identified using name and date of birth. Pertinent information was reviewed including allergies, history, medications and lab work. Patient states that he has no questions concerning procedure. Signed consent is on chart as well current history and physical. IV access was checked for patency.

## 2017-03-08 NOTE — DISCHARGE SUMMARY
Physician Discharge Summary     Patient ID:  Aamir Zazueta  [id number]  31 y.o.  1965    Admit date: 3/7/2017    Discharge date and time: 3/8/17    Admitting Physician: Ulices Cifuentes MD     Primary Cardiologist:none    Primary Care [address], NP    Discharge Physician: Eleazar Dye NP/ [facility]    Admission Diagnoses: mitral regurgitation  Mitral insufficiency    Discharge Diagnoses:   Patient Active Problem List    Diagnosis Date Noted    Mitral insufficiency 03/08/2017    Mitral regurgitation 03/07/2017    Essential hypertension 03/07/2017    TIA (transient ischemic attack) 03/07/2017    Shortness of breath 03/07/2017    BRITTNEY (obstructive sleep apnea) 03/07/2017    GERD (gastroesophageal reflux disease) 03/07/2017           Hospital Course: Aamir Zazueta is a 50 y.o. 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 Patient presented to [facility] with not feeling well for several weeks with insomnia/dry mouth. The the evening prior to admission c/o tinglin gin left side of face down left arm that lasted for 40 mins. Seen by MD at work and referred to ED. He denies any speech problems or facial droop. In ED, CT head with no acute findings, his B/P was 176/99, all labs unremarkable. Hospital med admitted patient for poss TIA vs CVA. MRI obtained and small vessel dx but no acute CVA, carotid US OK. Due to murmur an echo was ordered. Echo showed preserved LV function with EF 55-60%, no WMA, MV with Moderate prolapse involving the posterior leaflet. There is some nodular appearance of the posterior leaflet and cannot excluded vegetation. Patient would benefit from MARCUS to better assess pathology of mitral posterior leaflet. There was moderate to severe regurgitation.  The regurgitant jet was eccentric. Currently patient denies any chest pain, heart palpitations but reports chronic SOB for years that is worse with exertion with dizziness      The patient was transferred downtown for further evaluation. MARCUS noted no obvious veg but significant posterior leaflet on MV regurgitation. Diagnostic cath was performed noting no evidence of CAD. He will be discharged home later tonight after his appropriate bedrest from cath. He was placed on Vibramycin for sinusitis. Follow up in our office in two weeks to discuss further management of MVR. Disposition: home    Patient Instructions:   Current Discharge Medication List      START taking these medications    Details   doxycycline (VIBRAMYCIN) 100 mg capsule Take 1 Cap by mouth every twelve (12) hours for 7 days. Qty: 14 Cap, Refills: 0         CONTINUE these medications which have NOT CHANGED    Details   raNITIdine (ZANTAC) 150 mg tablet Take 150 mg by mouth DIALYSIS PRN for Indigestion. tadalafil (CIALIS) 5 mg tablet Take 1 Tab by mouth daily. Qty: 30 Tab, Refills: 11      acyclovir (ZOVIRAX) 200 mg capsule Take  by mouth daily. Referenced discharge instructions provided by nursing for diet and activity. Follow-up:  Primary Cardiologist:Dr. Carrillo Barron in 2 weeks  PCP: Prince Matilda NP) in about 4 weeks.     Signed:  Sahra Chen NP  3/8/2017  6:00 PM

## 2017-03-09 NOTE — PROGRESS NOTES
Bedside and Verbal shift change report given to self (oncoming nurse) by Sinai Lloyd (offgoing nurse). Report included the following information SBAR, Kardex, MAR, Accordion and Recent Results.

## 2017-03-09 NOTE — DISCHARGE INSTRUCTIONS
DISCHARGE SUMMARY from Nurse    The following personal items are in your possession at time of discharge:    Dental Appliances: None  Visual Aid: At bedside, Glasses     Home Medications: None  Jewelry: None  Clothing: At bedside, Footwear, Pants, Shirt, Socks, Undergarments  Other Valuables: Cell Phone             PATIENT INSTRUCTIONS:    After general anesthesia or intravenous sedation, for 24 hours or while taking prescription Narcotics:  · Limit your activities  · Do not drive and operate hazardous machinery  · Do not make important personal or business decisions  · Do  not drink alcoholic beverages  · If you have not urinated within 8 hours after discharge, please contact your surgeon on call. Report the following to your surgeon:  · Excessive pain, swelling, redness or odor of or around the surgical area  · Temperature over 100.5  · Nausea and vomiting lasting longer than 4 hours or if unable to take medications  · Any signs of decreased circulation or nerve impairment to extremity: change in color, persistent  numbness, tingling, coldness or increase pain  · Any questions        What to do at Home:  Recommended activity: Activity as tolerated to the limit of fatigue and shortness of breath    If you experience any of the following symptoms Chest Pain, Shortness of breath, Bleeding, Fever, please call [facility]iology or return to the nearest ER for immediate evaluation. *  Please give a list of your current medications to your Primary Care Provider. *  Please update this list whenever your medications are discontinued, doses are      changed, or new medications (including over-the-counter products) are added. *  Please carry medication information at all times in case of emergency situations.           These are general instructions for a healthy lifestyle:    No smoking/ No tobacco products/ Avoid exposure to second hand smoke    Surgeon General's Warning:  Quitting smoking now greatly reduces serious risk to your health. Obesity, smoking, and sedentary lifestyle greatly increases your risk for illness    A healthy diet, regular physical exercise & weight monitoring are important for maintaining a healthy lifestyle    You may be retaining fluid if you have a history of heart failure or if you experience any of the following symptoms:  Weight gain of 3 pounds or more overnight or 5 pounds in a week, increased swelling in our hands or feet or shortness of breath while lying flat in bed. Please call your doctor as soon as you notice any of these symptoms; do not wait until your next office visit. Recognize signs and symptoms of STROKE:    F-face looks uneven    A-arms unable to move or move unevenly    S-speech slurred or non-existent    T-time-call 911 as soon as signs and symptoms begin-DO NOT go       Back to bed or wait to see if you get better-TIME IS BRAIN. Warning Signs of HEART ATTACK     Call 911 if you have these symptoms:   Chest discomfort. Most heart attacks involve discomfort in the center of the chest that lasts more than a few minutes, or that goes away and comes back. It can feel like uncomfortable pressure, squeezing, fullness, or pain.  Discomfort in other areas of the upper body. Symptoms can include pain or discomfort in one or both arms, the back, neck, jaw, or stomach.  Shortness of breath with or without chest discomfort.  Other signs may include breaking out in a cold sweat, nausea, or lightheadedness. Don't wait more than five minutes to call 911 - MINUTES MATTER! Fast action can save your life. Calling 911 is almost always the fastest way to get lifesaving treatment. Emergency Medical Services staff can begin treatment when they arrive -- up to an hour sooner than if someone gets to the hospital by car. The discharge information has been reviewed with the patient. The patient verbalized understanding.     Discharge medications reviewed with the patient and appropriate educational materials and side effects teaching were provided. Cardiac Catheterization/Angiography Discharge Instructions    *Check the puncture site frequently for swelling or bleeding. If you see any bleeding, lie down and apply pressure over the area with a clean town or washcloth. Notify your doctor for any redness, swelling, drainage or oozing from the puncture site. Notify your doctor for any fever or chills. *If the leg or arm with the puncture becomes cold, numb or painful, call [facility]iology at [phone]    *Activity should be limited for the next 48 hours. Climb stairs as little as possible and avoid any stooping, bending or strenuous activity for 48 hours. No heavy lifting (anything over 10 pounds) for three days. *Do not drive for 48 hours. *You may resume your usual diet. Drink more fluids than usual.    *Have a responsible person drive you home and stay with you for at least 24 hours after your heart catheterization/angiography. *You may remove the bandage from your Right Groin in 24 hours. You may shower in 24 hours. No tub baths, hot tubs or swimming for one week. Do not place any lotions, creams, powders, ointments over the puncture site for one week. You may place a clean band-aid over the puncture site each day for 5 days. Change this daily. Heart Valve Disease: Care Instructions  Your Care Instructions    Your heart is a muscular pump that has four chambers and four valves. The four valves are the mitral, aortic, tricuspid, and pulmonary valves. The valves open and close to keep blood flowing in the proper direction through your heart. When something is wrong with one of the valves, the blood cannot flow in and out of the heart properly. Problems with the heart valves can cause leaks (valve regurgitation) and blockages (valve stenosis). You can be born with heart valve disease, or it can develop over a number of years.   Mild cases of heart valve disease may not cause problems, but more serious cases will weaken the heart and can lead to heart failure. Treatment with medicine can help relieve symptoms, but it will not fix the valve. You may need to have surgery to replace or repair the valve. Follow-up care is a key part of your treatment and safety. Be sure to make and go to all appointments, and call your doctor if you are having problems. It's also a good idea to know your test results and keep a list of the medicines you take. How can you care for yourself at home? · Take your medicines exactly as prescribed. Call your doctor if you think you are having a problem with your medicine. You will get more details on the specific medicines your doctor prescribes. · Eat a heart-healthy diet. For example, eat more fruits, vegetables, fish, lean meats, whole grains, and other high-fiber foods. Limit sodium, sugar, and alcohol. · Be active. Ask your doctor what type and level of exercise is safe for you. Walking is a good choice. You also may want to swim, bike, or do other activities. · Stay at a healthy weight. Lose weight if you need to. · Do not smoke. Smoking can cause more heart problems. If you need help quitting, talk to your doctor about stop-smoking programs and medicines. These can increase your chances of quitting for good. · Avoid colds and flu. Get a pneumococcal vaccine shot. If you have had one before, ask your doctor if you need another dose. Get a flu vaccine every year. · Take care of your teeth and gums. Get regular dental checkups. Good dental health is important because bacteria can spread from infected teeth and gums to the heart valves. · If you have an artificial valve, you may need to take antibiotics before you have certain dental or surgical procedures. When should you call for help? Call 911 anytime you think you may need emergency care. For example, call if:  · You have severe trouble breathing.   · You cough up pink, foamy mucus and you have trouble breathing. · You have symptoms of a heart attack. These may include:  ¨ Chest pain or pressure, or a strange feeling in the chest.  ¨ Sweating. ¨ Shortness of breath. ¨ Nausea or vomiting. ¨ Pain, pressure, or a strange feeling in the back, neck, jaw, or upper belly or in one or both shoulders or arms. ¨ Lightheadedness or sudden weakness. ¨ A fast or irregular heartbeat. After you call 911, the  may tell you to chew 1 adult-strength or 2 to 4 low-dose aspirin. Wait for an ambulance. Do not try to drive yourself. · You have symptoms of a stroke. These may include:  ¨ Sudden numbness, tingling, weakness, or loss of movement in your face, arm, or leg, especially on only one side of your body. ¨ Sudden vision changes. ¨ Sudden trouble speaking. ¨ Sudden confusion or trouble understanding simple statements. ¨ Sudden problems with walking or balance. ¨ A sudden, severe headache that is different from past headaches. · You passed out (lost consciousness). Call your doctor now or seek immediate medical care if:  · You have new or increased shortness of breath. · You are dizzy or lightheaded, or you feel like you may faint. · You gain 2 to 3 pounds or more over 2 days. · You have increased swelling in your legs, ankles, or feet. Watch closely for changes in your health, and be sure to contact your doctor if you have any problems. Where can you learn more? Go to http://karan-kecia.info/. Enter V941 in the search box to learn more about \"Heart Valve Disease: Care Instructions. \"  Current as of: January 27, 2016  Content Version: 11.1  © 2302-2345 [employer]. Care instructions adapted under license by [employer] (which disclaims liability or warranty for this information).  If you have questions about a medical condition or this instruction, always ask your healthcare professional. Salima Mcallister disclaims any warranty or liability for your use of this information.

## 2017-03-09 NOTE — PROGRESS NOTES
TRANSFER - IN REPORT:    Verbal report received from JAKE RAMIREZ [facility] on [address] [place]  being received from cath lab for routine progression of care      Report consisted of patients Situation, Background, Assessment and   Recommendations(SBAR). Information from the following report(s) Procedure Summary was reviewed with the receiving nurse. Opportunity for questions and clarification was provided. Assessment completed upon patients arrival to unit and care assumed. Patient to floor from cath lab. Patient placed on monitor and eagle for frequent vital signs. Right groin site, perclosed and right radial site with R band intact without bleeding or hematoma. Pulses palpable. Patient instructed to keep right leg straight and lie flat and to limit use of right hand/wrist. Patient verbalized understanding.  Will continue to monitor

## 2017-03-09 NOTE — PROCEDURES
Maria Eugenialina Milner 46       Name:  Valente Deal   MR#:  [id number]   :  1965   Account #:  [de-identified]   Date of Adm:  2017       DATE OF PROCEDURE: 2017. PROCEDURES PERFORMED: Left and right heart catheterization, left   ventriculography, and coronary angiography. HISTORY: This is a young man with chronic dyspnea on exertion,   and mitral valve prolapse with suspected symptomatic mitral   regurgitation. Cardiac catheterization is recommended. PROCEDURE: Left and right heart catheterization with left   ventriculography and coronary angiography is carried out from   the right femoral vein and right radial artery by modified   Seldinger technique without difficulty or complication. The left   ventricle was injected with a 5-Cymraes angled pigtail. The   coronaries were injected with a 6-Cymraes multipurpose. The right   heart cath was performed with a 7-Cymraes [place]-Rohan catheter. At the end of the procedure, the venous sheath was removed, and   Perclose was deployed. FINDINGS: The pulmonary artery pressure is 25/10 mmHg. The mean   wedge pressure is 10. Right ventricular pressure is 24/4 mmHg. The mean right atrial pressure is 2 mmHg. The average cardiac output is 6.1 liters per minute with an   index of 2.6 liters per minute per meter squared. The O2 saturation in the pulmonary artery is 72%, right atrium   71%. Left ventriculography reveals normal left ventricular size. The   wall motion is normal. Ejection fraction is 65%. There appears   to be mitral regurgitation that is hard to quantitate, at least   suspected to be 2+. The left and right coronaries are normal.    IMPRESSION   1. Normal left ventricular function. 2. Normal right heart catheterization. 3. Normal coronary arteries. 4. Normal pulmonary pressure. 5. Mitral regurgitation.     RECOMMENDATIONS: The patient will be considered for elective   mitral valve repair.         MD YOLANDA Boland / JAMAL   D:  03/08/2017   17:56   T:  03/09/2017   01:01   Job #:  [id number]

## 2017-03-12 LAB
BACTERIA SPEC CULT: NORMAL
BACTERIA SPEC CULT: NORMAL
SERVICE CMNT-IMP: NORMAL
SERVICE CMNT-IMP: NORMAL

## 2017-03-14 NOTE — PROCEDURES
Brief Cardiac Procedure Note    Patient: Dayna Mojica MRN: [id number]  SSN: [id number]    YOB: 1965  Age: 50 y.o. Sex: male      Date of Procedure: 3/14/2017     Pre-procedure Diagnosis: Shortness of Breath    Post-procedure Diagnosis: Mitral Valve Disorder    Procedure: Right and Left Heart Catheterization    Brief Description of Procedure:     Performed By: Blanca Medina MD     Assistants:     Anesthesia: Moderate Sedation    Estimated Blood Loss: Less than 10 mL      Specimens: None    Implants: None    Findings: nml lv nd cors and r heart, + mr    Complications: None    Recommendations: Continue medical therapy.     Signed By: Blanca Medina MD     March 14, 2017

## 2017-04-17 ENCOUNTER — HOSPITAL ENCOUNTER (OUTPATIENT)
Dept: ULTRASOUND IMAGING | Age: 52
Discharge: HOME | End: 2017-04-17
Attending: THORACIC SURGERY (CARDIOTHORACIC VASCULAR SURGERY)
Payer: COMMERCIAL

## 2017-04-17 ENCOUNTER — HOSPITAL ENCOUNTER (OUTPATIENT)
Dept: SURGERY | Age: 52
Discharge: HOME | End: 2017-04-17
Payer: COMMERCIAL

## 2017-04-17 ENCOUNTER — HOSPITAL ENCOUNTER (OUTPATIENT)
Dept: GENERAL RADIOLOGY | Age: 52
Discharge: HOME | End: 2017-04-17
Attending: THORACIC SURGERY (CARDIOTHORACIC VASCULAR SURGERY)
Payer: COMMERCIAL

## 2017-04-17 ENCOUNTER — ANESTHESIA EVENT (OUTPATIENT)
Dept: SURGERY | Age: 52
DRG: 221 | End: 2017-04-17
Payer: COMMERCIAL

## 2017-04-17 VITALS
OXYGEN SATURATION: 95 % | TEMPERATURE: 98.1 F | SYSTOLIC BLOOD PRESSURE: 147 MMHG | DIASTOLIC BLOOD PRESSURE: 93 MMHG | HEART RATE: 69 BPM | RESPIRATION RATE: 17 BRPM | BODY MASS INDEX: 37.65 KG/M2 | WEIGHT: 263 LBS | HEIGHT: 70 IN

## 2017-04-17 LAB
ALBUMIN SERPL BCP-MCNC: 3.9 G/DL (ref 3.5–5)
ANION GAP BLD CALC-SCNC: 10 MMOL/L (ref 7–16)
APPEARANCE UR: CLEAR
APTT PPP: 25.4 SEC (ref 23.5–31.7)
ATRIAL RATE: 65 BPM
BACTERIA SPEC CULT: NORMAL
BACTERIA URNS QL MICRO: 0 /HPF
BILIRUB SERPL-MCNC: 0.7 MG/DL (ref 0.2–1.1)
BILIRUB UR QL: NEGATIVE
BUN SERPL-MCNC: 13 MG/DL (ref 6–23)
CALCIUM SERPL-MCNC: 9 MG/DL (ref 8.3–10.4)
CALCULATED P AXIS, ECG09: 22 DEGREES
CALCULATED R AXIS, ECG10: 25 DEGREES
CALCULATED T AXIS, ECG11: 22 DEGREES
CASTS URNS QL MICRO: ABNORMAL /LPF
CHLORIDE SERPL-SCNC: 106 MMOL/L (ref 98–107)
CO2 SERPL-SCNC: 27 MMOL/L (ref 21–32)
COLOR UR: YELLOW
CREAT SERPL-MCNC: 1.13 MG/DL (ref 0.8–1.5)
DIAGNOSIS, 93000: NORMAL
EPI CELLS #/AREA URNS HPF: 0 /HPF
ERYTHROCYTE [DISTWIDTH] IN BLOOD BY AUTOMATED COUNT: 12.2 % (ref 11.9–14.6)
EST. AVERAGE GLUCOSE BLD GHB EST-MCNC: NORMAL MG/DL
GLUCOSE SERPL-MCNC: 88 MG/DL (ref 65–100)
GLUCOSE UR STRIP.AUTO-MCNC: NEGATIVE MG/DL
HBA1C MFR BLD: 4.9 % (ref 4.8–6)
HCT VFR BLD AUTO: 42.1 % (ref 41.1–50.3)
HGB BLD-MCNC: 15.3 G/DL (ref 13.6–17.2)
HGB UR QL STRIP: ABNORMAL
INR PPP: 1 (ref 0.9–1.2)
KETONES UR QL STRIP.AUTO: NEGATIVE MG/DL
LEUKOCYTE ESTERASE UR QL STRIP.AUTO: NEGATIVE
MCH RBC QN AUTO: 31 PG (ref 26.1–32.9)
MCHC RBC AUTO-ENTMCNC: 36.3 G/DL (ref 31.4–35)
MCV RBC AUTO: 85.2 FL (ref 79.6–97.8)
NITRITE UR QL STRIP.AUTO: NEGATIVE
P-R INTERVAL, ECG05: 158 MS
PH UR STRIP: 5.5 [PH] (ref 5–9)
PLATELET # BLD AUTO: 150 K/UL (ref 150–450)
PMV BLD AUTO: 11.5 FL (ref 10.8–14.1)
POTASSIUM SERPL-SCNC: 3.5 MMOL/L (ref 3.5–5.1)
PROT UR STRIP-MCNC: NEGATIVE MG/DL
PROTHROMBIN TIME: 10.9 SEC (ref 9.6–12)
Q-T INTERVAL, ECG07: 422 MS
QRS DURATION, ECG06: 96 MS
QTC CALCULATION (BEZET), ECG08: 438 MS
RBC # BLD AUTO: 4.94 M/UL (ref 4.23–5.67)
RBC #/AREA URNS HPF: ABNORMAL /HPF
SERVICE CMNT-IMP: NORMAL
SODIUM SERPL-SCNC: 143 MMOL/L (ref 136–145)
SP GR UR REFRACTOMETRY: 1.02 (ref 1–1.02)
UROBILINOGEN UR QL STRIP.AUTO: 0.2 EU/DL (ref 0.2–1)
VENTRICULAR RATE, ECG03: 65 BPM
WBC # BLD AUTO: 5.2 K/UL (ref 4.3–11.1)
WBC URNS QL MICRO: 0 /HPF

## 2017-04-17 PROCEDURE — 83036 HEMOGLOBIN GLYCOSYLATED A1C: CPT | Performed by: THORACIC SURGERY (CARDIOTHORACIC VASCULAR SURGERY)

## 2017-04-17 PROCEDURE — 81001 URINALYSIS AUTO W/SCOPE: CPT | Performed by: THORACIC SURGERY (CARDIOTHORACIC VASCULAR SURGERY)

## 2017-04-17 PROCEDURE — 80048 BASIC METABOLIC PNL TOTAL CA: CPT | Performed by: THORACIC SURGERY (CARDIOTHORACIC VASCULAR SURGERY)

## 2017-04-17 PROCEDURE — 85610 PROTHROMBIN TIME: CPT | Performed by: THORACIC SURGERY (CARDIOTHORACIC VASCULAR SURGERY)

## 2017-04-17 PROCEDURE — 85027 COMPLETE CBC AUTOMATED: CPT | Performed by: THORACIC SURGERY (CARDIOTHORACIC VASCULAR SURGERY)

## 2017-04-17 PROCEDURE — 82040 ASSAY OF SERUM ALBUMIN: CPT | Performed by: THORACIC SURGERY (CARDIOTHORACIC VASCULAR SURGERY)

## 2017-04-17 PROCEDURE — 93005 ELECTROCARDIOGRAM TRACING: CPT | Performed by: THORACIC SURGERY (CARDIOTHORACIC VASCULAR SURGERY)

## 2017-04-17 PROCEDURE — 71020 XR CHEST PA LAT: CPT

## 2017-04-17 PROCEDURE — 93880 EXTRACRANIAL BILAT STUDY: CPT

## 2017-04-17 PROCEDURE — 94010 BREATHING CAPACITY TEST: CPT

## 2017-04-17 PROCEDURE — 82247 BILIRUBIN TOTAL: CPT | Performed by: THORACIC SURGERY (CARDIOTHORACIC VASCULAR SURGERY)

## 2017-04-17 PROCEDURE — 87641 MR-STAPH DNA AMP PROBE: CPT | Performed by: THORACIC SURGERY (CARDIOTHORACIC VASCULAR SURGERY)

## 2017-04-17 PROCEDURE — 77030027138 HC INCENT SPIROMETER -A

## 2017-04-17 PROCEDURE — 85730 THROMBOPLASTIN TIME PARTIAL: CPT | Performed by: THORACIC SURGERY (CARDIOTHORACIC VASCULAR SURGERY)

## 2017-04-17 RX ORDER — LORATADINE 10 MG/1
10 TABLET ORAL
Status: ON HOLD | COMMUNITY
End: 2017-04-27 | Stop reason: CLARIF

## 2017-04-17 RX ORDER — SODIUM CHLORIDE 9 MG/ML
250 INJECTION, SOLUTION INTRAVENOUS AS NEEDED
Status: CANCELLED | OUTPATIENT
Start: 2017-04-17

## 2017-04-17 RX ORDER — AMIODARONE HYDROCHLORIDE 200 MG/1
600 TABLET ORAL ONCE
COMMUNITY
Start: 2017-04-18 | End: 2017-04-18

## 2017-04-17 RX ORDER — CHLORHEXIDINE GLUCONATE 4 G/100ML
SOLUTION TOPICAL DAILY PRN
COMMUNITY
End: 2017-04-25

## 2017-04-17 RX ORDER — SODIUM CHLORIDE 9 MG/ML
250 INJECTION, SOLUTION INTRAVENOUS AS NEEDED
Status: DISCONTINUED | OUTPATIENT
Start: 2017-04-17 | End: 2017-04-21 | Stop reason: HOSPADM

## 2017-04-17 NOTE — PERIOP NOTES
Recent Results (from the past 12 hour(s))   MSSA/MRSA SC BY PCR, NASAL SWAB    Collection Time: 04/17/17  8:15 AM   Result Value Ref Range    Special Requests: NO SPECIAL REQUESTS      Culture result:        SA target not detected. A MRSA NEGATIVE, SA NEGATIVE test result does not preclude MRSA or SA nasal colonization.    CBC W/O DIFF    Collection Time: 04/17/17  8:34 AM   Result Value Ref Range    WBC 5.2 4.3 - 11.1 K/uL    RBC 4.94 4.23 - 5.67 M/uL    HGB 15.3 13.6 - 17.2 g/dL    HCT 42.1 41.1 - 50.3 %    MCV 85.2 79.6 - 97.8 FL    MCH 31.0 26.1 - 32.9 PG    MCHC 36.3 (H) 31.4 - 35.0 g/dL    RDW 12.2 11.9 - 14.6 %    PLATELET 885 346 - 645 K/uL    MPV 11.5 10.8 - 83.8 FL   METABOLIC PANEL, BASIC    Collection Time: 04/17/17  8:34 AM   Result Value Ref Range    Sodium 143 136 - 145 mmol/L    Potassium 3.5 3.5 - 5.1 mmol/L    Chloride 106 98 - 107 mmol/L    CO2 27 21 - 32 mmol/L    Anion gap 10 7 - 16 mmol/L    Glucose 88 65 - 100 mg/dL    BUN 13 6 - 23 MG/DL    Creatinine 1.13 0.8 - 1.5 MG/DL    GFR est AA >60 >60 ml/min/1.73m2    GFR est non-AA >60 >60 ml/min/1.73m2    Calcium 9.0 8.3 - 10.4 MG/DL   HEMOGLOBIN A1C WITH EAG    Collection Time: 04/17/17  8:34 AM   Result Value Ref Range    Hemoglobin A1c 4.9 4.8 - 6.0 %    Est. average glucose Cannot be calulated mg/dL   PROTHROMBIN TIME + INR    Collection Time: 04/17/17  8:34 AM   Result Value Ref Range    Prothrombin time 10.9 9.6 - 12.0 sec    INR 1.0 0.9 - 1.2     PTT    Collection Time: 04/17/17  8:34 AM   Result Value Ref Range    aPTT 25.4 23.5 - 31.7 SEC   BILIRUBIN, TOTAL    Collection Time: 04/17/17  8:34 AM   Result Value Ref Range    Bilirubin, total 0.7 0.2 - 1.1 MG/DL   ALBUMIN    Collection Time: 04/17/17  8:34 AM   Result Value Ref Range    Albumin 3.9 3.5 - 5.0 g/dL   URINALYSIS W/ RFLX MICROSCOPIC    Collection Time: 04/17/17  9:14 AM   Result Value Ref Range    Color YELLOW      Appearance CLEAR      Specific gravity 1.024 (H) 1.001 - 1.023      pH (UA) 5.5 5.0 - 9.0      Protein NEGATIVE  NEG mg/dL    Glucose NEGATIVE  mg/dL    Ketone NEGATIVE  NEG mg/dL    Bilirubin NEGATIVE  NEG      Blood MODERATE (A) NEG      Urobilinogen 0.2 0.2 - 1.0 EU/dL    Nitrites NEGATIVE  NEG      Leukocyte Esterase NEGATIVE  NEG      WBC 0 0 /hpf    RBC 20-50 0 /hpf    Epithelial cells 0 0 /hpf    Bacteria 0 0 /hpf    Casts 0-3 0 /lpf

## 2017-04-17 NOTE — PERIOP NOTES
Patient agrees that pt name, date of birth and procedure is correct. All questions were answered to the best of their ability. Pt is here with his wife Cari Cooper for preassessment. Dr Buster Dumasiters in to assess patient per anesthesia protocol. Reviewed all cardiac records: EKG today, EKG 3/6/17 MARCUS Echo 3/14/17, TTE Echo 3/7/17, Cath Report 3/7/17 and cardiac office note 3/30/17     Patient viewed preoperative heart teaching video. Pre op instructions and education sheets given and reviewed with patient:  Heart instructions, central line catheter infection prevention, ventilator education,  blood transfusion education, hand hygiene education, smoking cessation education, pain management education. Patient verbalizes understanding of all. Patient given incentive spirometer with verbal instructions,demonstrates proficient use, and verbalizes understanding to bring incentive spirometer on day of surgery. Pt instructed on importance of handwashing for infection prevention. Pt verbalizes understanding to shower nightly with antibacterial soap provided at pre assessment for THREE nights prior to surgery, and on the night before surgery at the end of shower wash with HIBICLENS from chin to toes avoiding genitalia. Pt verbalizes understanding to repeat this the morning of surgery. Pt provided list of all current medications. PT VERBALIZES UNDERSTANDING TO TAKE ONLY THESE MEDS DAY OF SURGERY: Aspirin 81 mg, omeprazole, and mupirocin nasal ointment applied inside each nostril    PT VERBALIZES UNDERSTANDING TO HOLD ONLY THESE MEDS UNTIL AFTER SURGERY:none    PT. VERBALIZES UNDERSTANDING TO CONTINUE ALL OTHER MEDICATIONS AS PRESCRIBED UNTIL DAY OF SURGERY. Prescriptions called to patient's pharmacy: [facility] [phone]  Patient given written and verbal instructions to obtain. Pt understands instructions to apply Mupirocin ointment with clean q-tips to each nostril twice daily and morning of surgery.  Pt verbalizes understanding to take Amiodarone 200 mg 3 tablets after 4 pm the night before surgery and Lopressor 12.5 mg take after 4 pm the night before surgery. Patient seen by respiratory therapist, and FEV1 results on chart. MRSA swab obtained and sent to lab. Patient ID armband placed on patient's arm, and patient given copy of order for CXR and carotid ultrasound. Patient verbalizes understanding to proceed to radiology after labs are drawn to have both CXR and carotid ultrasound completed before leaving the hospital today. While pt was present in [facility], received call from Melly Llamas at Dr Nayan Alvarez office stating that surgery was rescheduled for 4/21/17. Patient instructed to return on morning prior to surgery (4/20/17) to the [facility] lab for blood bank labs. No appointment is required, however it is recommended to come between the hours of 8 am and 12 noon and not to remove green blood bank wrist band applied until discharge after surgery. Patient verbalizes understanding that arrival time will be called to patient on the weekday prior to surgery date and that patient must check phone messages. If patient has any questions regarding arrival times call [phone]. PT. INSTRUCTED TO CALL THE FOLLOWING NUMBERS IF ANY SAFETY CONCERNS BEFORE, DURING, OR AFTER HOSPITAL ENCOUNTER: PT. SAFETY [facility] - [phone] OR PT. RELATIONS - [phone].

## 2017-04-17 NOTE — ANESTHESIA PREPROCEDURE EVALUATION
Anesthetic History     Increased risk of difficult airway and PONV          Review of Systems / Medical History  Patient summary reviewed and pertinent labs reviewed    Pulmonary        Sleep apnea: No treatment  Shortness of breath         Neuro/Psych         TIA     Cardiovascular    Hypertension: well controlled              Exercise tolerance: >4 METS  Comments: Worsening SOB  Nml Coronaries  Significant P2 MV      GI/Hepatic/Renal     GERD: well controlled           Endo/Other      Hypothyroidism: well controlled  Obesity     Other Findings            Physical Exam    Airway  Mallampati: III  TM Distance: 4 - 6 cm  Neck ROM: normal range of motion   Mouth opening: Normal     Cardiovascular    Rhythm: regular  Rate: normal    Murmur: Grade 3, Mitral area     Dental    Dentition: Caps/crowns     Pulmonary  Breath sounds clear to auscultation               Abdominal  GI exam deferred       Other Findings            Anesthetic Plan    ASA: 4  Anesthesia type: general    Monitoring Plan: Arterial line, BIS, [place]-Rohan, CVP and MARCUS    Post procedure ventilation   Induction: Intravenous  Anesthetic plan and risks discussed with: Patient and Spouse      Per ENT, \"anterior larynx\".

## 2017-04-19 ENCOUNTER — ANESTHESIA (OUTPATIENT)
Dept: SURGERY | Age: 52
DRG: 221 | End: 2017-04-19
Payer: COMMERCIAL

## 2017-04-20 ENCOUNTER — HOSPITAL ENCOUNTER (OUTPATIENT)
Dept: LAB | Age: 52
Discharge: HOME | End: 2017-04-20
Payer: COMMERCIAL

## 2017-04-20 PROCEDURE — 86923 COMPATIBILITY TEST ELECTRIC: CPT | Performed by: THORACIC SURGERY (CARDIOTHORACIC VASCULAR SURGERY)

## 2017-04-20 PROCEDURE — 86900 BLOOD TYPING SEROLOGIC ABO: CPT | Performed by: THORACIC SURGERY (CARDIOTHORACIC VASCULAR SURGERY)

## 2017-04-20 PROCEDURE — 36415 COLL VENOUS BLD VENIPUNCTURE: CPT | Performed by: THORACIC SURGERY (CARDIOTHORACIC VASCULAR SURGERY)

## 2017-04-20 RX ORDER — CEFAZOLIN SODIUM IN 0.9 % NACL 2 G/50 ML
2 INTRAVENOUS SOLUTION, PIGGYBACK (ML) INTRAVENOUS ONCE
Status: CANCELLED | OUTPATIENT
Start: 2017-04-21 | End: 2017-04-21

## 2017-04-20 RX ORDER — AMIODARONE HYDROCHLORIDE 200 MG/1
400 TABLET ORAL ONCE
Status: CANCELLED | OUTPATIENT
Start: 2017-04-21 | End: 2017-04-21

## 2017-04-21 ENCOUNTER — HOSPITAL ENCOUNTER (INPATIENT)
Age: 52
LOS: 4 days | Discharge: HOME HEALTH | DRG: 221 | End: 2017-04-25
Attending: THORACIC SURGERY (CARDIOTHORACIC VASCULAR SURGERY) | Admitting: THORACIC SURGERY (CARDIOTHORACIC VASCULAR SURGERY)
Payer: COMMERCIAL

## 2017-04-21 ENCOUNTER — APPOINTMENT (OUTPATIENT)
Dept: GENERAL RADIOLOGY | Age: 52
DRG: 221 | End: 2017-04-21
Attending: THORACIC SURGERY (CARDIOTHORACIC VASCULAR SURGERY)
Payer: COMMERCIAL

## 2017-04-21 DIAGNOSIS — E04.1 THYROID NODULE: Chronic | ICD-10-CM

## 2017-04-21 DIAGNOSIS — G47.33 OSA (OBSTRUCTIVE SLEEP APNEA): ICD-10-CM

## 2017-04-21 DIAGNOSIS — I10 ESSENTIAL HYPERTENSION: Chronic | ICD-10-CM

## 2017-04-21 DIAGNOSIS — I34.0 MITRAL VALVE INSUFFICIENCY, UNSPECIFIED ETIOLOGY: ICD-10-CM

## 2017-04-21 DIAGNOSIS — K21.9 GASTROESOPHAGEAL REFLUX DISEASE WITHOUT ESOPHAGITIS: ICD-10-CM

## 2017-04-21 DIAGNOSIS — G89.29 CHRONIC RIGHT SHOULDER PAIN: ICD-10-CM

## 2017-04-21 DIAGNOSIS — Z99.11 ENCOUNTER FOR WEANING FROM VENTILATOR (HCC): ICD-10-CM

## 2017-04-21 DIAGNOSIS — R09.02 HYPOXEMIA: ICD-10-CM

## 2017-04-21 DIAGNOSIS — M25.511 CHRONIC RIGHT SHOULDER PAIN: ICD-10-CM

## 2017-04-21 DIAGNOSIS — R06.02 SHORTNESS OF BREATH: ICD-10-CM

## 2017-04-21 LAB
ANION GAP BLD CALC-SCNC: 13 MMOL/L (ref 7–16)
APTT PPP: 26.5 SEC (ref 23.5–31.7)
ARTERIAL PATENCY WRIST A: ABNORMAL
BASE DEFICIT BLD-SCNC: 1 MMOL/L
BASE DEFICIT BLD-SCNC: 2 MMOL/L
BASE DEFICIT BLD-SCNC: 5 MMOL/L
BASE DEFICIT BLDA-SCNC: 7.7 MMOL/L (ref 0–2)
BASE DEFICIT BLDA-SCNC: 9.2 MMOL/L (ref 0–2)
BASE DEFICIT BLDA-SCNC: 9.3 MMOL/L (ref 0–2)
BASE EXCESS BLD CALC-SCNC: 0 MMOL/L
BASE EXCESS BLD CALC-SCNC: 0 MMOL/L
BASE EXCESS BLD CALC-SCNC: 1 MMOL/L
BASE EXCESS BLD CALC-SCNC: 3 MMOL/L
BDY SITE: ABNORMAL
BUN SERPL-MCNC: 14 MG/DL (ref 6–23)
CA-I BLD-MCNC: 1.07 MMOL/L (ref 1.12–1.32)
CA-I BLD-MCNC: 1.08 MMOL/L (ref 1.12–1.32)
CA-I BLD-MCNC: 1.09 MMOL/L (ref 1.12–1.32)
CA-I BLD-MCNC: 1.11 MMOL/L (ref 1.12–1.32)
CA-I BLD-MCNC: 1.15 MMOL/L (ref 1.12–1.32)
CA-I BLD-MCNC: 1.2 MMOL/L (ref 1.12–1.32)
CA-I BLD-MCNC: 1.21 MMOL/L (ref 1.12–1.32)
CA-I BLD-SCNC: 1.14 MMOL/L (ref 1–1.3)
CA-I BLD-SCNC: 1.14 MMOL/L (ref 1–1.3)
CALCIUM SERPL-MCNC: 8 MG/DL (ref 8.3–10.4)
CHLORIDE BLDA-SCNC: 106 MMOL/L (ref 98–106)
CHLORIDE BLDA-SCNC: 109 MMOL/L (ref 98–106)
CHLORIDE SERPL-SCNC: 111 MMOL/L (ref 98–107)
CO2 SERPL-SCNC: 24 MMOL/L (ref 21–32)
COHGB MFR BLD: 0.2 % (ref 0.5–1.5)
COHGB MFR BLD: 0.7 % (ref 0.5–1.5)
COHGB MFR BLD: 0.9 % (ref 0.5–1.5)
CREAT SERPL-MCNC: 1.69 MG/DL (ref 0.8–1.5)
DO-HGB BLD-MCNC: 4 % (ref 0–5)
DO-HGB BLD-MCNC: 6 % (ref 0–5)
DO-HGB BLD-MCNC: 7 % (ref 0–5)
ERYTHROCYTE [DISTWIDTH] IN BLOOD BY AUTOMATED COUNT: 12.5 % (ref 11.9–14.6)
FIBRINOGEN PPP-MCNC: 154 MG/DL (ref 172–437)
GLUCOSE BLD STRIP.AUTO-MCNC: 116 MG/DL (ref 65–100)
GLUCOSE BLD STRIP.AUTO-MCNC: 116 MG/DL (ref 70–105)
GLUCOSE BLD STRIP.AUTO-MCNC: 118 MG/DL (ref 70–105)
GLUCOSE BLD STRIP.AUTO-MCNC: 119 MG/DL (ref 70–105)
GLUCOSE BLD STRIP.AUTO-MCNC: 124 MG/DL (ref 70–105)
GLUCOSE BLD STRIP.AUTO-MCNC: 128 MG/DL (ref 65–100)
GLUCOSE BLD STRIP.AUTO-MCNC: 141 MG/DL (ref 65–100)
GLUCOSE BLD STRIP.AUTO-MCNC: 141 MG/DL (ref 65–100)
GLUCOSE BLD STRIP.AUTO-MCNC: 143 MG/DL (ref 65–100)
GLUCOSE BLD STRIP.AUTO-MCNC: 143 MG/DL (ref 65–100)
GLUCOSE BLD STRIP.AUTO-MCNC: 144 MG/DL (ref 65–100)
GLUCOSE BLD STRIP.AUTO-MCNC: 145 MG/DL (ref 65–100)
GLUCOSE BLD STRIP.AUTO-MCNC: 152 MG/DL (ref 65–100)
GLUCOSE BLD STRIP.AUTO-MCNC: 89 MG/DL (ref 70–105)
GLUCOSE BLD STRIP.AUTO-MCNC: 92 MG/DL (ref 70–105)
GLUCOSE BLD STRIP.AUTO-MCNC: 95 MG/DL (ref 70–105)
GLUCOSE SERPL-MCNC: 121 MG/DL (ref 65–100)
HCO3 BLD-SCNC: 21.5 MMOL/L (ref 22–26)
HCO3 BLD-SCNC: 24.8 MMOL/L (ref 22–26)
HCO3 BLD-SCNC: 24.9 MMOL/L (ref 22–26)
HCO3 BLD-SCNC: 25.6 MMOL/L (ref 22–26)
HCO3 BLD-SCNC: 26.6 MMOL/L (ref 22–26)
HCO3 BLD-SCNC: 26.9 MMOL/L (ref 22–26)
HCO3 BLD-SCNC: 28.5 MMOL/L (ref 22–26)
HCO3 BLDA-SCNC: 17 MMOL/L (ref 22–26)
HCO3 BLDA-SCNC: 17 MMOL/L (ref 22–26)
HCO3 BLDA-SCNC: 19 MMOL/L (ref 22–26)
HCT VFR BLD AUTO: 38.4 % (ref 41.1–50.3)
HCT VFR BLD AUTO: 39.4 % (ref 41.1–50.3)
HCT VFR BLD AUTO: 40.6 % (ref 41.1–50.3)
HGB BLD-MCNC: 13.9 G/DL (ref 13.6–17.2)
HGB BLD-MCNC: 14.1 G/DL (ref 13.6–17.2)
HGB BLD-MCNC: 14.3 G/DL (ref 13.6–17.2)
HGB BLDMV-MCNC: 15 GM/DL (ref 11.7–15)
HGB BLDMV-MCNC: 15.1 GM/DL (ref 11.7–15)
HGB BLDMV-MCNC: 15.3 GM/DL (ref 11.7–15)
INR PPP: 1.1 (ref 0.9–1.2)
MAGNESIUM SERPL-MCNC: 2.2 MG/DL (ref 1.8–2.4)
MAGNESIUM SERPL-MCNC: 2.3 MG/DL (ref 1.8–2.4)
MAGNESIUM SERPL-MCNC: 2.8 MG/DL (ref 1.8–2.4)
MCH RBC QN AUTO: 31.3 PG (ref 26.1–32.9)
MCHC RBC AUTO-ENTMCNC: 35.2 G/DL (ref 31.4–35)
MCV RBC AUTO: 88.8 FL (ref 79.6–97.8)
METHGB MFR BLD: 0.5 % (ref 0–1.5)
METHGB MFR BLD: 0.5 % (ref 0–1.5)
METHGB MFR BLD: 0.6 % (ref 0–1.5)
OXYHGB MFR BLDA: 91.8 % (ref 94–97)
OXYHGB MFR BLDA: 92.6 % (ref 94–97)
OXYHGB MFR BLDA: 94.8 % (ref 94–97)
PCO2 BLD: 41.2 MMHG (ref 35–45)
PCO2 BLD: 46.9 MMHG (ref 35–45)
PCO2 BLD: 49.4 MMHG (ref 35–45)
PCO2 BLD: 51.2 MMHG (ref 35–45)
PCO2 BLD: 52.2 MMHG (ref 35–45)
PCO2 BLD: 52.5 MMHG (ref 35–45)
PCO2 BLD: 54.3 MMHG (ref 35–45)
PCO2 BLDA: 33 MMHG (ref 35–45)
PCO2 BLDA: 36 MMHG (ref 35–45)
PCO2 BLDA: 50 MMHG (ref 35–45)
PEEP RESPIRATORY: 8 CM[H2O]
PH BLD: 7.27 [PH] (ref 7.35–7.45)
PH BLD: 7.29 [PH] (ref 7.35–7.45)
PH BLD: 7.3 [PH] (ref 7.35–7.45)
PH BLD: 7.32 [PH] (ref 7.35–7.45)
PH BLD: 7.32 [PH] (ref 7.35–7.45)
PH BLD: 7.34 [PH] (ref 7.35–7.45)
PH BLD: 7.39 [PH] (ref 7.35–7.45)
PH BLDA: 7.2 [PH] (ref 7.35–7.45)
PH BLDA: 7.28 [PH] (ref 7.35–7.45)
PH BLDA: 7.33 [PH] (ref 7.35–7.45)
PLATELET # BLD AUTO: 159 K/UL (ref 150–450)
PMV BLD AUTO: 11.9 FL (ref 10.8–14.1)
PO2 BLD: 123 MMHG (ref 80–105)
PO2 BLD: 207 MMHG (ref 80–105)
PO2 BLD: 239 MMHG (ref 80–105)
PO2 BLD: 260 MMHG (ref 80–105)
PO2 BLD: 322 MMHG (ref 80–105)
PO2 BLD: 38 MMHG (ref 80–105)
PO2 BLD: 64 MMHG (ref 80–105)
PO2 BLDA: 75 MMHG (ref 75–100)
PO2 BLDA: 83 MMHG (ref 75–100)
PO2 BLDA: 87 MMHG (ref 75–100)
POTASSIUM BLD-SCNC: 3.8 MMOL/L (ref 3.5–5.1)
POTASSIUM BLD-SCNC: 4 MMOL/L (ref 3.5–5.1)
POTASSIUM BLD-SCNC: 4.3 MMOL/L (ref 3.5–5.1)
POTASSIUM BLD-SCNC: 4.6 MMOL/L (ref 3.5–5.1)
POTASSIUM BLD-SCNC: 4.7 MMOL/L (ref 3.5–5.1)
POTASSIUM BLDA-SCNC: 4.11 MMOL/L (ref 3.5–5.3)
POTASSIUM BLDA-SCNC: 4.9 MMOL/L (ref 3.5–5.3)
POTASSIUM SERPL-SCNC: 4.1 MMOL/L (ref 3.5–5.1)
POTASSIUM SERPL-SCNC: 4.5 MMOL/L (ref 3.5–5.1)
POTASSIUM SERPL-SCNC: 4.9 MMOL/L (ref 3.5–5.1)
PROTHROMBIN TIME: 12 SEC (ref 9.6–12)
RBC # BLD AUTO: 4.57 M/UL (ref 4.23–5.67)
RESP RATE: 15
SAO2 % BLD: 100 % (ref 95–98)
SAO2 % BLD: 67 % (ref 95–98)
SAO2 % BLD: 92 % (ref 95–98)
SAO2 % BLD: 93 % (ref 92–98.5)
SAO2 % BLD: 94 % (ref 92–98.5)
SAO2 % BLD: 96 % (ref 92–98.5)
SAO2 % BLD: 98 % (ref 95–98)
SERVICE CMNT-IMP: ABNORMAL
SODIUM BLD-SCNC: 140 MMOL/L (ref 138–146)
SODIUM BLD-SCNC: 140 MMOL/L (ref 138–146)
SODIUM BLD-SCNC: 141 MMOL/L (ref 138–146)
SODIUM BLD-SCNC: 141 MMOL/L (ref 138–146)
SODIUM BLD-SCNC: 142 MMOL/L (ref 138–146)
SODIUM BLDA-SCNC: 138.4 MMOL/L (ref 135–148)
SODIUM BLDA-SCNC: 139.1 MMOL/L (ref 135–148)
SODIUM SERPL-SCNC: 148 MMOL/L (ref 136–145)
VENTILATION MODE VENT: ABNORMAL
VT SETTING VENT: 550 ML
WBC # BLD AUTO: 20.7 K/UL (ref 4.3–11.1)

## 2017-04-21 PROCEDURE — 77030013794 HC KT TRNSDUC BLD EDWD -B: Performed by: NURSE ANESTHETIST, CERTIFIED REGISTERED

## 2017-04-21 PROCEDURE — 99254 IP/OBS CNSLTJ NEW/EST MOD 60: CPT | Performed by: INTERNAL MEDICINE

## 2017-04-21 PROCEDURE — 74011250636 HC RX REV CODE- 250/636: Performed by: THORACIC SURGERY (CARDIOTHORACIC VASCULAR SURGERY)

## 2017-04-21 PROCEDURE — 82947 ASSAY GLUCOSE BLOOD QUANT: CPT

## 2017-04-21 PROCEDURE — 77030009355 HC CRDPLG DEL SET QUES -C: Performed by: THORACIC SURGERY (CARDIOTHORACIC VASCULAR SURGERY)

## 2017-04-21 PROCEDURE — 88305 TISSUE EXAM BY PATHOLOGIST: CPT | Performed by: THORACIC SURGERY (CARDIOTHORACIC VASCULAR SURGERY)

## 2017-04-21 PROCEDURE — 77030026882 HC RNG ANUPLST MTRL PHY EDWD -I1: Performed by: THORACIC SURGERY (CARDIOTHORACIC VASCULAR SURGERY)

## 2017-04-21 PROCEDURE — 77030018836 HC SOL IRR NACL ICUM -A: Performed by: THORACIC SURGERY (CARDIOTHORACIC VASCULAR SURGERY)

## 2017-04-21 PROCEDURE — C1729 CATH, DRAINAGE: HCPCS | Performed by: THORACIC SURGERY (CARDIOTHORACIC VASCULAR SURGERY)

## 2017-04-21 PROCEDURE — 77030010827: Performed by: THORACIC SURGERY (CARDIOTHORACIC VASCULAR SURGERY)

## 2017-04-21 PROCEDURE — 77030002913 HC SUT ETHBND J&J -B: Performed by: THORACIC SURGERY (CARDIOTHORACIC VASCULAR SURGERY)

## 2017-04-21 PROCEDURE — B24BZZ4 ULTRASONOGRAPHY OF HEART WITH AORTA, TRANSESOPHAGEAL: ICD-10-PCS | Performed by: THORACIC SURGERY (CARDIOTHORACIC VASCULAR SURGERY)

## 2017-04-21 PROCEDURE — 77030020751 HC FLTR TBNG TRNSFUS HAEM -A: Performed by: THORACIC SURGERY (CARDIOTHORACIC VASCULAR SURGERY)

## 2017-04-21 PROCEDURE — 77030013386: Performed by: THORACIC SURGERY (CARDIOTHORACIC VASCULAR SURGERY)

## 2017-04-21 PROCEDURE — 77030002912 HC SUT ETHBND J&J -A: Performed by: THORACIC SURGERY (CARDIOTHORACIC VASCULAR SURGERY)

## 2017-04-21 PROCEDURE — 77030008703 HC TU ET UNCUF COVD -A: Performed by: NURSE ANESTHETIST, CERTIFIED REGISTERED

## 2017-04-21 PROCEDURE — 80048 BASIC METABOLIC PNL TOTAL CA: CPT | Performed by: THORACIC SURGERY (CARDIOTHORACIC VASCULAR SURGERY)

## 2017-04-21 PROCEDURE — 76010000155 HC AUTO TRANSFUSION/CELL SAVER: Performed by: THORACIC SURGERY (CARDIOTHORACIC VASCULAR SURGERY)

## 2017-04-21 PROCEDURE — 77030005424 HC CATH THOR GTNG -A: Performed by: THORACIC SURGERY (CARDIOTHORACIC VASCULAR SURGERY)

## 2017-04-21 PROCEDURE — 3E080GC INTRODUCTION OF OTHER THERAPEUTIC SUBSTANCE INTO HEART, OPEN APPROACH: ICD-10-PCS | Performed by: THORACIC SURGERY (CARDIOTHORACIC VASCULAR SURGERY)

## 2017-04-21 PROCEDURE — 74011000302 HC RX REV CODE- 302: Performed by: THORACIC SURGERY (CARDIOTHORACIC VASCULAR SURGERY)

## 2017-04-21 PROCEDURE — 77030018548 HC SUT ETHBND2 J&J -B: Performed by: THORACIC SURGERY (CARDIOTHORACIC VASCULAR SURGERY)

## 2017-04-21 PROCEDURE — 74011000250 HC RX REV CODE- 250: Performed by: THORACIC SURGERY (CARDIOTHORACIC VASCULAR SURGERY)

## 2017-04-21 PROCEDURE — 74011250636 HC RX REV CODE- 250/636

## 2017-04-21 PROCEDURE — 77030018793 HC ORG SUT GAB FRAT TELE -B: Performed by: THORACIC SURGERY (CARDIOTHORACIC VASCULAR SURGERY)

## 2017-04-21 PROCEDURE — 77030019908 HC STETH ESOPH SIMS -A: Performed by: NURSE ANESTHETIST, CERTIFIED REGISTERED

## 2017-04-21 PROCEDURE — 5A1221Z PERFORMANCE OF CARDIAC OUTPUT, CONTINUOUS: ICD-10-PCS | Performed by: THORACIC SURGERY (CARDIOTHORACIC VASCULAR SURGERY)

## 2017-04-21 PROCEDURE — 77030008477 HC STYL SATN SLP COVD -A: Performed by: NURSE ANESTHETIST, CERTIFIED REGISTERED

## 2017-04-21 PROCEDURE — 74011250637 HC RX REV CODE- 250/637: Performed by: THORACIC SURGERY (CARDIOTHORACIC VASCULAR SURGERY)

## 2017-04-21 PROCEDURE — 77030020782 HC GWN BAIR PAWS FLX 3M -B: Performed by: NURSE ANESTHETIST, CERTIFIED REGISTERED

## 2017-04-21 PROCEDURE — 02UG0JZ SUPPLEMENT MITRAL VALVE WITH SYNTHETIC SUBSTITUTE, OPEN APPROACH: ICD-10-PCS | Performed by: THORACIC SURGERY (CARDIOTHORACIC VASCULAR SURGERY)

## 2017-04-21 PROCEDURE — 86580 TB INTRADERMAL TEST: CPT | Performed by: THORACIC SURGERY (CARDIOTHORACIC VASCULAR SURGERY)

## 2017-04-21 PROCEDURE — 77030018729 HC ELECTRD DEFIB PAD CARD -B: Performed by: THORACIC SURGERY (CARDIOTHORACIC VASCULAR SURGERY)

## 2017-04-21 PROCEDURE — 77030033068 HC DEV COR-KNOT SUT KT LSIS -E: Performed by: THORACIC SURGERY (CARDIOTHORACIC VASCULAR SURGERY)

## 2017-04-21 PROCEDURE — 83735 ASSAY OF MAGNESIUM: CPT | Performed by: THORACIC SURGERY (CARDIOTHORACIC VASCULAR SURGERY)

## 2017-04-21 PROCEDURE — 74011000258 HC RX REV CODE- 258: Performed by: THORACIC SURGERY (CARDIOTHORACIC VASCULAR SURGERY)

## 2017-04-21 PROCEDURE — 76010000219 HC CV SURG 6 TO 6.5 HR INTENSV-TIER 1: Performed by: THORACIC SURGERY (CARDIOTHORACIC VASCULAR SURGERY)

## 2017-04-21 PROCEDURE — 85730 THROMBOPLASTIN TIME PARTIAL: CPT | Performed by: THORACIC SURGERY (CARDIOTHORACIC VASCULAR SURGERY)

## 2017-04-21 PROCEDURE — 77030018792 HC NDL SUT TFSH -A: Performed by: THORACIC SURGERY (CARDIOTHORACIC VASCULAR SURGERY)

## 2017-04-21 PROCEDURE — 74011636637 HC RX REV CODE- 636/637: Performed by: THORACIC SURGERY (CARDIOTHORACIC VASCULAR SURGERY)

## 2017-04-21 PROCEDURE — 76060000044 HC ANESTHESIA 6.5 TO 7 HR: Performed by: THORACIC SURGERY (CARDIOTHORACIC VASCULAR SURGERY)

## 2017-04-21 PROCEDURE — 77030034927 HC PK PROC CPB INSPIRE PERF LIVA -F: Performed by: THORACIC SURGERY (CARDIOTHORACIC VASCULAR SURGERY)

## 2017-04-21 PROCEDURE — 77030014007 HC SPNG HEMSTAT J&J -B: Performed by: THORACIC SURGERY (CARDIOTHORACIC VASCULAR SURGERY)

## 2017-04-21 PROCEDURE — 77030005521 HC CATH URETH FOL38 BARD -B: Performed by: THORACIC SURGERY (CARDIOTHORACIC VASCULAR SURGERY)

## 2017-04-21 PROCEDURE — 80051 ELECTROLYTE PANEL: CPT

## 2017-04-21 PROCEDURE — 85384 FIBRINOGEN ACTIVITY: CPT | Performed by: THORACIC SURGERY (CARDIOTHORACIC VASCULAR SURGERY)

## 2017-04-21 PROCEDURE — 5A1223Z PERFORMANCE OF CARDIAC PACING, CONTINUOUS: ICD-10-PCS | Performed by: THORACIC SURGERY (CARDIOTHORACIC VASCULAR SURGERY)

## 2017-04-21 PROCEDURE — 77030020268 HC MISC GENERAL SUPPLY: Performed by: THORACIC SURGERY (CARDIOTHORACIC VASCULAR SURGERY)

## 2017-04-21 PROCEDURE — 85610 PROTHROMBIN TIME: CPT | Performed by: THORACIC SURGERY (CARDIOTHORACIC VASCULAR SURGERY)

## 2017-04-21 PROCEDURE — 77030011640 HC PAD GRND REM COVD -A: Performed by: THORACIC SURGERY (CARDIOTHORACIC VASCULAR SURGERY)

## 2017-04-21 PROCEDURE — 85018 HEMOGLOBIN: CPT | Performed by: THORACIC SURGERY (CARDIOTHORACIC VASCULAR SURGERY)

## 2017-04-21 PROCEDURE — 71010 XR CHEST SNGL V: CPT

## 2017-04-21 PROCEDURE — 77030008771 HC TU NG SALEM SUMP -A: Performed by: NURSE ANESTHETIST, CERTIFIED REGISTERED

## 2017-04-21 PROCEDURE — 36600 WITHDRAWAL OF ARTERIAL BLOOD: CPT

## 2017-04-21 PROCEDURE — 85027 COMPLETE CBC AUTOMATED: CPT | Performed by: THORACIC SURGERY (CARDIOTHORACIC VASCULAR SURGERY)

## 2017-04-21 PROCEDURE — 77030031139 HC SUT VCRL2 J&J -A: Performed by: THORACIC SURGERY (CARDIOTHORACIC VASCULAR SURGERY)

## 2017-04-21 PROCEDURE — 74011000250 HC RX REV CODE- 250

## 2017-04-21 PROCEDURE — 77030013292 HC BOWL MX PRSM J&J -A: Performed by: NURSE ANESTHETIST, CERTIFIED REGISTERED

## 2017-04-21 PROCEDURE — 94002 VENT MGMT INPAT INIT DAY: CPT

## 2017-04-21 PROCEDURE — 77030018547 HC SUT ETHBND1 J&J -B: Performed by: THORACIC SURGERY (CARDIOTHORACIC VASCULAR SURGERY)

## 2017-04-21 PROCEDURE — 77030020407 HC IV BLD WRMR ST 3M -A: Performed by: NURSE ANESTHETIST, CERTIFIED REGISTERED

## 2017-04-21 PROCEDURE — 77030002996 HC SUT SLK J&J -A: Performed by: THORACIC SURGERY (CARDIOTHORACIC VASCULAR SURGERY)

## 2017-04-21 PROCEDURE — 82803 BLOOD GASES ANY COMBINATION: CPT

## 2017-04-21 PROCEDURE — 77030011235 HC DRN PERCRD SUMP MEDT -B: Performed by: THORACIC SURGERY (CARDIOTHORACIC VASCULAR SURGERY)

## 2017-04-21 PROCEDURE — 93005 ELECTROCARDIOGRAM TRACING: CPT | Performed by: THORACIC SURGERY (CARDIOTHORACIC VASCULAR SURGERY)

## 2017-04-21 PROCEDURE — 77030020751 HC FLTR TBNG TRNSFUS HAEM -A: Performed by: NURSE ANESTHETIST, CERTIFIED REGISTERED

## 2017-04-21 PROCEDURE — 77030033070 HC RLD QLC FPCH COR-KNOT LSIS -C: Performed by: THORACIC SURGERY (CARDIOTHORACIC VASCULAR SURGERY)

## 2017-04-21 PROCEDURE — 65610000006 HC RM INTENSIVE CARE

## 2017-04-21 PROCEDURE — 77030002986 HC SUT PROL J&J -A: Performed by: THORACIC SURGERY (CARDIOTHORACIC VASCULAR SURGERY)

## 2017-04-21 PROCEDURE — 74011000258 HC RX REV CODE- 258

## 2017-04-21 PROCEDURE — 77030025646 HC AUTOTRNSFUS KT TERU -C: Performed by: THORACIC SURGERY (CARDIOTHORACIC VASCULAR SURGERY)

## 2017-04-21 PROCEDURE — C1751 CATH, INF, PER/CENT/MIDLINE: HCPCS | Performed by: NURSE ANESTHETIST, CERTIFIED REGISTERED

## 2017-04-21 PROCEDURE — 94660 CPAP INITIATION&MGMT: CPT

## 2017-04-21 PROCEDURE — 77030002888 HC SUT CHRMC J&J -A: Performed by: THORACIC SURGERY (CARDIOTHORACIC VASCULAR SURGERY)

## 2017-04-21 PROCEDURE — 84132 ASSAY OF SERUM POTASSIUM: CPT | Performed by: THORACIC SURGERY (CARDIOTHORACIC VASCULAR SURGERY)

## 2017-04-21 PROCEDURE — P9047 ALBUMIN (HUMAN), 25%, 50ML: HCPCS

## 2017-04-21 PROCEDURE — 77030002966 HC SUT PDS J&J -A: Performed by: THORACIC SURGERY (CARDIOTHORACIC VASCULAR SURGERY)

## 2017-04-21 PROCEDURE — 77030033069 HC RLD QLC SGL COR-KNOT LSIS -B: Performed by: THORACIC SURGERY (CARDIOTHORACIC VASCULAR SURGERY)

## 2017-04-21 PROCEDURE — 77030025827 HC BG BLD DNR AUTLG MEDT -A: Performed by: THORACIC SURGERY (CARDIOTHORACIC VASCULAR SURGERY)

## 2017-04-21 PROCEDURE — 77030003034 HC SUT WRE CRD J&J -B: Performed by: THORACIC SURGERY (CARDIOTHORACIC VASCULAR SURGERY)

## 2017-04-21 PROCEDURE — 93312 ECHO TRANSESOPHAGEAL: CPT

## 2017-04-21 PROCEDURE — 77030013797 HC KT TRNSDUC PRSSR EDWD -A: Performed by: THORACIC SURGERY (CARDIOTHORACIC VASCULAR SURGERY)

## 2017-04-21 PROCEDURE — 77030005401 HC CATH RAD ARRO -A: Performed by: NURSE ANESTHETIST, CERTIFIED REGISTERED

## 2017-04-21 PROCEDURE — 82962 GLUCOSE BLOOD TEST: CPT

## 2017-04-21 PROCEDURE — 74011250636 HC RX REV CODE- 250/636: Performed by: ANESTHESIOLOGY

## 2017-04-21 DEVICE — IMPLANTABLE DEVICE: Type: IMPLANTABLE DEVICE | Site: MITRAL VALVE | Status: FUNCTIONAL

## 2017-04-21 RX ORDER — CEFAZOLIN SODIUM IN 0.9 % NACL 2 G/50 ML
2 INTRAVENOUS SOLUTION, PIGGYBACK (ML) INTRAVENOUS EVERY 8 HOURS
Status: COMPLETED | OUTPATIENT
Start: 2017-04-21 | End: 2017-04-22

## 2017-04-21 RX ORDER — ROCURONIUM BROMIDE 10 MG/ML
INJECTION, SOLUTION INTRAVENOUS AS NEEDED
Status: DISCONTINUED | OUTPATIENT
Start: 2017-04-21 | End: 2017-04-21 | Stop reason: HOSPADM

## 2017-04-21 RX ORDER — DEXTROSE 50 % IN WATER (D50W) INTRAVENOUS SYRINGE
25 AS NEEDED
Status: DISCONTINUED | OUTPATIENT
Start: 2017-04-21 | End: 2017-04-22

## 2017-04-21 RX ORDER — SUCCINYLCHOLINE CHLORIDE 20 MG/ML
INJECTION INTRAMUSCULAR; INTRAVENOUS AS NEEDED
Status: DISCONTINUED | OUTPATIENT
Start: 2017-04-21 | End: 2017-04-21 | Stop reason: HOSPADM

## 2017-04-21 RX ORDER — MUPIROCIN 20 MG/G
OINTMENT TOPICAL 2 TIMES DAILY
Status: DISCONTINUED | OUTPATIENT
Start: 2017-04-21 | End: 2017-04-22

## 2017-04-21 RX ORDER — LIDOCAINE HCL/PF 100 MG/5ML
50-100 SYRINGE (ML) INTRAVENOUS
Status: DISCONTINUED | OUTPATIENT
Start: 2017-04-21 | End: 2017-04-22

## 2017-04-21 RX ORDER — INSULIN GLARGINE 100 [IU]/ML
20 INJECTION, SOLUTION SUBCUTANEOUS ONCE
Status: COMPLETED | OUTPATIENT
Start: 2017-04-21 | End: 2017-04-21

## 2017-04-21 RX ORDER — TRAMADOL HYDROCHLORIDE 50 MG/1
50 TABLET ORAL
Status: DISCONTINUED | OUTPATIENT
Start: 2017-04-21 | End: 2017-04-25 | Stop reason: HOSPADM

## 2017-04-21 RX ORDER — SODIUM CHLORIDE 9 MG/ML
INJECTION, SOLUTION INTRAVENOUS
Status: DISCONTINUED | OUTPATIENT
Start: 2017-04-21 | End: 2017-04-21 | Stop reason: HOSPADM

## 2017-04-21 RX ORDER — MIDAZOLAM HYDROCHLORIDE 1 MG/ML
INJECTION, SOLUTION INTRAMUSCULAR; INTRAVENOUS AS NEEDED
Status: DISCONTINUED | OUTPATIENT
Start: 2017-04-21 | End: 2017-04-21 | Stop reason: HOSPADM

## 2017-04-21 RX ORDER — MORPHINE SULFATE 10 MG/ML
3-5 INJECTION, SOLUTION INTRAMUSCULAR; INTRAVENOUS
Status: DISCONTINUED | OUTPATIENT
Start: 2017-04-21 | End: 2017-04-21 | Stop reason: ALTCHOICE

## 2017-04-21 RX ORDER — SODIUM CHLORIDE 0.9 % (FLUSH) 0.9 %
5-10 SYRINGE (ML) INJECTION AS NEEDED
Status: DISCONTINUED | OUTPATIENT
Start: 2017-04-21 | End: 2017-04-21 | Stop reason: HOSPADM

## 2017-04-21 RX ORDER — AMIODARONE HYDROCHLORIDE 200 MG/1
200 TABLET ORAL EVERY 12 HOURS
Status: DISCONTINUED | OUTPATIENT
Start: 2017-04-21 | End: 2017-04-25 | Stop reason: HOSPADM

## 2017-04-21 RX ORDER — POTASSIUM CHLORIDE 14.9 MG/ML
10 INJECTION INTRAVENOUS AS NEEDED
Status: DISCONTINUED | OUTPATIENT
Start: 2017-04-21 | End: 2017-04-22

## 2017-04-21 RX ORDER — AMIODARONE HYDROCHLORIDE 200 MG/1
400 TABLET ORAL ONCE
Status: COMPLETED | OUTPATIENT
Start: 2017-04-21 | End: 2017-04-21

## 2017-04-21 RX ORDER — MUPIROCIN 20 MG/G
OINTMENT TOPICAL ONCE
Status: DISCONTINUED | OUTPATIENT
Start: 2017-04-21 | End: 2017-04-21 | Stop reason: HOSPADM

## 2017-04-21 RX ORDER — SODIUM CHLORIDE, SODIUM LACTATE, POTASSIUM CHLORIDE, CALCIUM CHLORIDE 600; 310; 30; 20 MG/100ML; MG/100ML; MG/100ML; MG/100ML
100 INJECTION, SOLUTION INTRAVENOUS CONTINUOUS
Status: DISCONTINUED | OUTPATIENT
Start: 2017-04-21 | End: 2017-04-21 | Stop reason: HOSPADM

## 2017-04-21 RX ORDER — SODIUM BICARBONATE 1 MEQ/ML
50 SYRINGE (ML) INTRAVENOUS AS NEEDED
Status: DISCONTINUED | OUTPATIENT
Start: 2017-04-21 | End: 2017-04-22

## 2017-04-21 RX ORDER — MIDAZOLAM HYDROCHLORIDE 1 MG/ML
2 INJECTION, SOLUTION INTRAMUSCULAR; INTRAVENOUS
Status: COMPLETED | OUTPATIENT
Start: 2017-04-21 | End: 2017-04-21

## 2017-04-21 RX ORDER — SODIUM CHLORIDE 0.9 % (FLUSH) 0.9 %
5-10 SYRINGE (ML) INJECTION EVERY 8 HOURS
Status: DISCONTINUED | OUTPATIENT
Start: 2017-04-21 | End: 2017-04-21 | Stop reason: HOSPADM

## 2017-04-21 RX ORDER — VECURONIUM BROMIDE FOR INJECTION 1 MG/ML
INJECTION, POWDER, LYOPHILIZED, FOR SOLUTION INTRAVENOUS AS NEEDED
Status: DISCONTINUED | OUTPATIENT
Start: 2017-04-21 | End: 2017-04-21 | Stop reason: HOSPADM

## 2017-04-21 RX ORDER — FAMOTIDINE 10 MG/ML
20 INJECTION INTRAVENOUS EVERY 12 HOURS
Status: DISCONTINUED | OUTPATIENT
Start: 2017-04-21 | End: 2017-04-22

## 2017-04-21 RX ORDER — MIDAZOLAM HYDROCHLORIDE 1 MG/ML
1 INJECTION, SOLUTION INTRAMUSCULAR; INTRAVENOUS
Status: DISCONTINUED | OUTPATIENT
Start: 2017-04-21 | End: 2017-04-21 | Stop reason: ALTCHOICE

## 2017-04-21 RX ORDER — HEPARIN SODIUM 1000 [USP'U]/ML
INJECTION, SOLUTION INTRAVENOUS; SUBCUTANEOUS AS NEEDED
Status: DISCONTINUED | OUTPATIENT
Start: 2017-04-21 | End: 2017-04-21 | Stop reason: HOSPADM

## 2017-04-21 RX ORDER — CHLORHEXIDINE GLUCONATE 1.2 MG/ML
10 RINSE ORAL 2 TIMES DAILY
Status: DISCONTINUED | OUTPATIENT
Start: 2017-04-21 | End: 2017-04-22

## 2017-04-21 RX ORDER — PROPOFOL 10 MG/ML
INJECTION, EMULSION INTRAVENOUS AS NEEDED
Status: DISCONTINUED | OUTPATIENT
Start: 2017-04-21 | End: 2017-04-21 | Stop reason: HOSPADM

## 2017-04-21 RX ORDER — SODIUM CHLORIDE 0.9 % (FLUSH) 0.9 %
5-10 SYRINGE (ML) INJECTION AS NEEDED
Status: DISCONTINUED | OUTPATIENT
Start: 2017-04-21 | End: 2017-04-22

## 2017-04-21 RX ORDER — NITROGLYCERIN 20 MG/100ML
INJECTION INTRAVENOUS
Status: DISCONTINUED | OUTPATIENT
Start: 2017-04-21 | End: 2017-04-21 | Stop reason: HOSPADM

## 2017-04-21 RX ORDER — SODIUM CHLORIDE 0.9 % (FLUSH) 0.9 %
5-10 SYRINGE (ML) INJECTION EVERY 8 HOURS
Status: DISCONTINUED | OUTPATIENT
Start: 2017-04-21 | End: 2017-04-22

## 2017-04-21 RX ORDER — SUFENTANIL CITRATE 50 UG/ML
INJECTION EPIDURAL; INTRAVENOUS AS NEEDED
Status: DISCONTINUED | OUTPATIENT
Start: 2017-04-21 | End: 2017-04-21 | Stop reason: HOSPADM

## 2017-04-21 RX ORDER — PROTAMINE SULFATE 10 MG/ML
INJECTION, SOLUTION INTRAVENOUS AS NEEDED
Status: DISCONTINUED | OUTPATIENT
Start: 2017-04-21 | End: 2017-04-21 | Stop reason: HOSPADM

## 2017-04-21 RX ORDER — ONDANSETRON 2 MG/ML
4 INJECTION INTRAMUSCULAR; INTRAVENOUS
Status: DISCONTINUED | OUTPATIENT
Start: 2017-04-21 | End: 2017-04-22

## 2017-04-21 RX ORDER — CEFAZOLIN SODIUM 1 G/3ML
INJECTION, POWDER, FOR SOLUTION INTRAMUSCULAR; INTRAVENOUS AS NEEDED
Status: DISCONTINUED | OUTPATIENT
Start: 2017-04-21 | End: 2017-04-21 | Stop reason: HOSPADM

## 2017-04-21 RX ORDER — SODIUM CHLORIDE, SODIUM LACTATE, POTASSIUM CHLORIDE, CALCIUM CHLORIDE 600; 310; 30; 20 MG/100ML; MG/100ML; MG/100ML; MG/100ML
INJECTION, SOLUTION INTRAVENOUS
Status: DISCONTINUED | OUTPATIENT
Start: 2017-04-21 | End: 2017-04-21 | Stop reason: HOSPADM

## 2017-04-21 RX ORDER — GUAIFENESIN 100 MG/5ML
81 LIQUID (ML) ORAL DAILY
Status: DISCONTINUED | OUTPATIENT
Start: 2017-04-22 | End: 2017-04-22 | Stop reason: SDUPTHER

## 2017-04-21 RX ORDER — METOPROLOL TARTRATE 25 MG/1
25 TABLET, FILM COATED ORAL EVERY 12 HOURS
Status: DISCONTINUED | OUTPATIENT
Start: 2017-04-22 | End: 2017-04-25 | Stop reason: HOSPADM

## 2017-04-21 RX ORDER — DEXTROSE, SODIUM CHLORIDE, AND POTASSIUM CHLORIDE 5; .45; .15 G/100ML; G/100ML; G/100ML
25 INJECTION INTRAVENOUS CONTINUOUS
Status: DISCONTINUED | OUTPATIENT
Start: 2017-04-21 | End: 2017-04-22

## 2017-04-21 RX ORDER — ONDANSETRON 2 MG/ML
INJECTION INTRAMUSCULAR; INTRAVENOUS
Status: COMPLETED
Start: 2017-04-21 | End: 2017-04-21

## 2017-04-21 RX ORDER — OXYCODONE AND ACETAMINOPHEN 10; 325 MG/1; MG/1
1 TABLET ORAL
Status: DISCONTINUED | OUTPATIENT
Start: 2017-04-21 | End: 2017-04-23

## 2017-04-21 RX ORDER — OXYCODONE AND ACETAMINOPHEN 5; 325 MG/1; MG/1
1 TABLET ORAL
Status: DISCONTINUED | OUTPATIENT
Start: 2017-04-21 | End: 2017-04-22

## 2017-04-21 RX ORDER — MAGNESIUM SULFATE 1 G/100ML
1 INJECTION INTRAVENOUS AS NEEDED
Status: DISCONTINUED | OUTPATIENT
Start: 2017-04-21 | End: 2017-04-22

## 2017-04-21 RX ORDER — SODIUM CHLORIDE 9 MG/ML
25 INJECTION, SOLUTION INTRAVENOUS CONTINUOUS
Status: DISCONTINUED | OUTPATIENT
Start: 2017-04-21 | End: 2017-04-22

## 2017-04-21 RX ORDER — SODIUM CHLORIDE, SODIUM LACTATE, POTASSIUM CHLORIDE, CALCIUM CHLORIDE 600; 310; 30; 20 MG/100ML; MG/100ML; MG/100ML; MG/100ML
INJECTION, SOLUTION INTRAVENOUS
Status: DISCONTINUED | OUTPATIENT
Start: 2017-04-21 | End: 2017-04-21

## 2017-04-21 RX ORDER — NALOXONE HYDROCHLORIDE 0.4 MG/ML
0.4 INJECTION, SOLUTION INTRAMUSCULAR; INTRAVENOUS; SUBCUTANEOUS AS NEEDED
Status: DISCONTINUED | OUTPATIENT
Start: 2017-04-21 | End: 2017-04-22

## 2017-04-21 RX ORDER — LIDOCAINE HYDROCHLORIDE 20 MG/ML
INJECTION, SOLUTION EPIDURAL; INFILTRATION; INTRACAUDAL; PERINEURAL AS NEEDED
Status: DISCONTINUED | OUTPATIENT
Start: 2017-04-21 | End: 2017-04-21 | Stop reason: HOSPADM

## 2017-04-21 RX ORDER — NITROGLYCERIN 20 MG/100ML
10-100 INJECTION INTRAVENOUS
Status: DISCONTINUED | OUTPATIENT
Start: 2017-04-21 | End: 2017-04-22

## 2017-04-21 RX ORDER — HYDROMORPHONE HYDROCHLORIDE 1 MG/ML
1 INJECTION, SOLUTION INTRAMUSCULAR; INTRAVENOUS; SUBCUTANEOUS
Status: DISCONTINUED | OUTPATIENT
Start: 2017-04-21 | End: 2017-04-25 | Stop reason: HOSPADM

## 2017-04-21 RX ORDER — LIDOCAINE HYDROCHLORIDE 10 MG/ML
0.3 INJECTION INFILTRATION; PERINEURAL ONCE
Status: DISCONTINUED | OUTPATIENT
Start: 2017-04-21 | End: 2017-04-21 | Stop reason: HOSPADM

## 2017-04-21 RX ADMIN — SODIUM CHLORIDE 25 ML/HR: 900 INJECTION, SOLUTION INTRAVENOUS at 16:17

## 2017-04-21 RX ADMIN — INSULIN GLARGINE 20 UNITS: 100 INJECTION, SOLUTION SUBCUTANEOUS at 21:39

## 2017-04-21 RX ADMIN — SUFENTANIL CITRATE 50 MCG: 50 INJECTION EPIDURAL; INTRAVENOUS at 09:34

## 2017-04-21 RX ADMIN — SODIUM CHLORIDE, SODIUM LACTATE, POTASSIUM CHLORIDE, CALCIUM CHLORIDE: 600; 310; 30; 20 INJECTION, SOLUTION INTRAVENOUS at 07:40

## 2017-04-21 RX ADMIN — SODIUM CHLORIDE, SODIUM LACTATE, POTASSIUM CHLORIDE, AND CALCIUM CHLORIDE 100 ML/HR: 600; 310; 30; 20 INJECTION, SOLUTION INTRAVENOUS at 07:16

## 2017-04-21 RX ADMIN — ROCURONIUM BROMIDE 10 MG: 10 INJECTION, SOLUTION INTRAVENOUS at 08:11

## 2017-04-21 RX ADMIN — AMIODARONE HYDROCHLORIDE 200 MG: 200 TABLET ORAL at 21:23

## 2017-04-21 RX ADMIN — DEXTROSE MONOHYDRATE, SODIUM CHLORIDE, AND POTASSIUM CHLORIDE 25 ML/HR: 50; 4.5; 1.49 INJECTION, SOLUTION INTRAVENOUS at 16:16

## 2017-04-21 RX ADMIN — SODIUM CHLORIDE: 9 INJECTION, SOLUTION INTRAVENOUS at 08:41

## 2017-04-21 RX ADMIN — SUFENTANIL CITRATE 50 MCG: 50 INJECTION EPIDURAL; INTRAVENOUS at 09:29

## 2017-04-21 RX ADMIN — AMIODARONE HYDROCHLORIDE 400 MG: 200 TABLET ORAL at 06:24

## 2017-04-21 RX ADMIN — MUPIROCIN: 20 OINTMENT TOPICAL at 17:49

## 2017-04-21 RX ADMIN — SODIUM CHLORIDE 1 UNITS/HR: 900 INJECTION, SOLUTION INTRAVENOUS at 17:30

## 2017-04-21 RX ADMIN — MIDAZOLAM HYDROCHLORIDE 2 MG: 1 INJECTION, SOLUTION INTRAMUSCULAR; INTRAVENOUS at 07:15

## 2017-04-21 RX ADMIN — OXYCODONE HYDROCHLORIDE AND ACETAMINOPHEN 1 TABLET: 5; 325 TABLET ORAL at 18:41

## 2017-04-21 RX ADMIN — Medication 10 ML: at 16:20

## 2017-04-21 RX ADMIN — OXYCODONE HYDROCHLORIDE AND ACETAMINOPHEN 1 TABLET: 10; 325 TABLET ORAL at 21:24

## 2017-04-21 RX ADMIN — SODIUM BICARBONATE 50 MEQ: 84 INJECTION, SOLUTION INTRAVENOUS at 18:19

## 2017-04-21 RX ADMIN — ROCURONIUM BROMIDE 40 MG: 10 INJECTION, SOLUTION INTRAVENOUS at 08:17

## 2017-04-21 RX ADMIN — MIDAZOLAM HYDROCHLORIDE 5 MG: 1 INJECTION, SOLUTION INTRAMUSCULAR; INTRAVENOUS at 12:06

## 2017-04-21 RX ADMIN — LIDOCAINE HYDROCHLORIDE 100 MG: 20 INJECTION, SOLUTION EPIDURAL; INFILTRATION; INTRACAUDAL; PERINEURAL at 08:10

## 2017-04-21 RX ADMIN — ROCURONIUM BROMIDE 50 MG: 10 INJECTION, SOLUTION INTRAVENOUS at 12:06

## 2017-04-21 RX ADMIN — TUBERCULIN PURIFIED PROTEIN DERIVATIVE 5 UNITS: 5 INJECTION INTRADERMAL at 21:35

## 2017-04-21 RX ADMIN — CEFAZOLIN 3 G: 1 INJECTION, POWDER, FOR SOLUTION INTRAMUSCULAR; INTRAVENOUS; PARENTERAL at 09:00

## 2017-04-21 RX ADMIN — VECURONIUM BROMIDE FOR INJECTION 5 MG: 1 INJECTION, POWDER, LYOPHILIZED, FOR SOLUTION INTRAVENOUS at 11:45

## 2017-04-21 RX ADMIN — MIDAZOLAM HYDROCHLORIDE 2 MG: 1 INJECTION, SOLUTION INTRAMUSCULAR; INTRAVENOUS at 08:10

## 2017-04-21 RX ADMIN — HYDROMORPHONE HYDROCHLORIDE 1 MG: 1 INJECTION, SOLUTION INTRAMUSCULAR; INTRAVENOUS; SUBCUTANEOUS at 23:16

## 2017-04-21 RX ADMIN — FAMOTIDINE 20 MG: 10 INJECTION, SOLUTION INTRAVENOUS at 21:23

## 2017-04-21 RX ADMIN — SUFENTANIL CITRATE 50 MCG: 50 INJECTION EPIDURAL; INTRAVENOUS at 09:20

## 2017-04-21 RX ADMIN — ONDANSETRON 4 MG: 2 INJECTION INTRAMUSCULAR; INTRAVENOUS at 19:52

## 2017-04-21 RX ADMIN — SUFENTANIL CITRATE 50 MCG: 50 INJECTION EPIDURAL; INTRAVENOUS at 11:40

## 2017-04-21 RX ADMIN — MIDAZOLAM HYDROCHLORIDE 1 MG: 1 INJECTION, SOLUTION INTRAMUSCULAR; INTRAVENOUS at 07:49

## 2017-04-21 RX ADMIN — VECURONIUM BROMIDE FOR INJECTION 5 MG: 1 INJECTION, POWDER, LYOPHILIZED, FOR SOLUTION INTRAVENOUS at 09:30

## 2017-04-21 RX ADMIN — NITROGLYCERIN 30 MCG/MIN: 20 INJECTION INTRAVENOUS at 10:01

## 2017-04-21 RX ADMIN — Medication 10 ML: at 21:24

## 2017-04-21 RX ADMIN — HEPARIN SODIUM 40000 UNITS: 1000 INJECTION, SOLUTION INTRAVENOUS; SUBCUTANEOUS at 10:17

## 2017-04-21 RX ADMIN — PROPOFOL 50 MG: 10 INJECTION, EMULSION INTRAVENOUS at 08:11

## 2017-04-21 RX ADMIN — SODIUM CHLORIDE, SODIUM LACTATE, POTASSIUM CHLORIDE, CALCIUM CHLORIDE: 600; 310; 30; 20 INJECTION, SOLUTION INTRAVENOUS at 08:41

## 2017-04-21 RX ADMIN — SUFENTANIL CITRATE 50 MCG: 50 INJECTION EPIDURAL; INTRAVENOUS at 09:36

## 2017-04-21 RX ADMIN — SUFENTANIL CITRATE 30 MCG: 50 INJECTION EPIDURAL; INTRAVENOUS at 08:10

## 2017-04-21 RX ADMIN — CEFAZOLIN 2 G: 1 INJECTION, POWDER, FOR SOLUTION INTRAMUSCULAR; INTRAVENOUS; PARENTERAL at 19:54

## 2017-04-21 RX ADMIN — MIDAZOLAM HYDROCHLORIDE 1 MG: 1 INJECTION, SOLUTION INTRAMUSCULAR; INTRAVENOUS at 07:45

## 2017-04-21 RX ADMIN — VECURONIUM BROMIDE FOR INJECTION 5 MG: 1 INJECTION, POWDER, LYOPHILIZED, FOR SOLUTION INTRAVENOUS at 08:45

## 2017-04-21 RX ADMIN — CHLORHEXIDINE GLUCONATE 10 ML: 1.2 RINSE ORAL at 17:48

## 2017-04-21 RX ADMIN — SUCCINYLCHOLINE CHLORIDE 200 MG: 20 INJECTION INTRAMUSCULAR; INTRAVENOUS at 08:12

## 2017-04-21 RX ADMIN — SUFENTANIL CITRATE 20 MCG: 50 INJECTION EPIDURAL; INTRAVENOUS at 08:15

## 2017-04-21 RX ADMIN — PROTAMINE SULFATE 320 MG: 10 INJECTION, SOLUTION INTRAVENOUS at 13:05

## 2017-04-21 RX ADMIN — CEFAZOLIN 3 G: 1 INJECTION, POWDER, FOR SOLUTION INTRAMUSCULAR; INTRAVENOUS; PARENTERAL at 12:00

## 2017-04-21 RX ADMIN — PHENYLEPHRINE HYDROCHLORIDE 50 MCG/MIN: 10 INJECTION INTRAVENOUS at 15:00

## 2017-04-21 RX ADMIN — MIDAZOLAM HYDROCHLORIDE 1 MG: 1 INJECTION, SOLUTION INTRAMUSCULAR; INTRAVENOUS at 07:53

## 2017-04-21 NOTE — ANESTHESIA PROCEDURE NOTES
Arterial Line Placement    Start time: 4/21/2017 8:05 AM  End time: 4/21/2017 8:12 AM  Performed by: Janina Hough  Authorized by: Crow CLARK     Pre-Procedure  Indications:  Arterial pressure monitoring and blood sampling    Procedure:   Prep:  ChloraPrep  Seldinger Technique?: Yes    Orientation:  Left  Location:  Radial artery  Catheter size:  20 G  Number of attempts:  3  Cont Cardiac Output Sensor: No      Assessment:   Post-procedure:  Sterile dressing applied and line secured  Patient Tolerance:  Patient tolerated the procedure well with no immediate complications  Comment:   Amrita Malave CRNA, attempted x2 for left radial johanny prior to my placement on the first attempt--Dr Norris Kuhn

## 2017-04-21 NOTE — PROGRESS NOTES
Dual skin assessment performed. Pt turned, skin with blanchable redness, allevyn intact, replaced. Will monitor.

## 2017-04-21 NOTE — CONSULTS
Cardiovascular ICU Consult Note: 2017  Julio Cardoza  Admission Date: 2017     The patient's chart is reviewed and the patient is discussed with the staff. Subjective:     Patient is seen at the request of Dr. Abhijit Ordaz for respiratory management status post cardiac surgery. Pt has a history of HTN, BRITTNEY(never got CPAP), GERD, and prior history of herpes infection of left eye noted to have heart murmur about 2 years ago. Pt presented to the ER at [facility] with tingling in left side of face and down left arm. Imaging negative for CVA and pt felt to have TIA. Pt had echo which revealed EF 55-60%, no WMA, MV with moderate prolapse involving the posterior leaflet with some nodular appearance of of the posterior leaflet and MARCUS recommended to exclude vegetation. He had MARCUS which revealed no vegetation but did reveal significant posterior leaflet on MV regurgitation. He had LHC with normal coronaries and severe mitral regurgitation with flail P2. He was referred to Dr. Abhijit Ordaz and felt appropriate for MV replacement. Pt admitted today for elective mini MVR. Currently is sedated in CV-ICU and orally intubated receiving  mechanical ventilation. We have been asked to see in the CV-ICU for mechanical ventilation management and weaning. Prior to Admission Medications   Prescriptions Last Dose Informant Patient Reported? Taking? AZELASTINE HCL (ASTEPRO NA)   Yes No   Si Sprays by Nasal route daily. acyclovir (ZOVIRAX) 200 mg capsule   Yes No   Sig: Take 200 mg by mouth daily. aspirin delayed-release 81 mg tablet   Yes No   Sig: Take 81 mg by mouth every morning. chlorhexidine (HIBICLENS) 4 % liquid   Yes No   Sig: Apply  to affected area daily as needed. Provided at preassessment 17--- showeer using hibiclens on the night before and morning of procedure per handout instructions   furosemide (LASIX) 40 mg tablet   No No   Sig: Take 1 Tab by mouth daily.    Patient taking differently: Take 40 mg by mouth every morning. loratadine (CLARITIN) 10 mg tablet   Yes No   Sig: Take 10 mg by mouth daily as needed for Allergies. mupirocin calcium (BACTROBAN) 2 % nasal ointment   Yes No   Sig: by Both Nostrils route two (2) times a day. Called in to [address]- 4/17/17   One time dose, no refills  Apply inside each nostril twice daily with clean qtip   omeprazole (PRILOSEC) 40 mg capsule   Yes No   Sig: Take 40 mg by mouth every morning. potassium chloride (K-DUR, KLOR-CON) 20 mEq tablet   No No   Sig: Take 1 Tab by mouth two (2) times a day. tadalafil (CIALIS) 5 mg tablet   No No   Sig: Take 1 Tab by mouth daily. Patient taking differently: Take 5 mg by mouth daily as needed. Facility-Administered Medications: None       Review of Systems  Review of systems not obtained due to patient factors.     Past Medical History:   Diagnosis Date    Acute mitral insufficiency     Difficult intubation     \"anterior larynx?  glide scope and blue bougie guide helpful per Dr Chelle Ernst ENT    GERD (gastroesophageal reflux disease)     daily [facility] elevated 4\" and sleeps on 3 pillows    History of kidney stones 1982    History of kidney stones     History of [place] spotted fever 1983    Hypertension     no longer takes lisinopril, only Lasix    Mitral regurgitation     Obesity (BMI 30-39.9) 04/17/2017    BMI 37    Pancreatic cyst     PONV (postoperative nausea and vomiting)     with sinus sx    Sleep apnea     Does not use CPAP    Thyroid nodule      Past Surgical History:   Procedure Laterality Date    HX ERCP      removal of pancreatic cyst-    HX HEENT      lymphnode removed from L neck    HX LAP CHOLECYSTECTOMY  2009    HX UROLOGICAL  1983    cystoto remove kidney stone    SINUS SURGERY PROC UNLISTED  2002     Social History     Social History    Marital status:      Spouse name: N/A    Number of children: N/A    Years of education: N/A Occupational History    Not on file. Social History Main Topics    Smoking status: Never Smoker    Smokeless tobacco: Never Used    Alcohol use No    Drug use: No    Sexual activity: Not on file     Other Topics Concern    Not on file     Social History Narrative     Family History   Problem Relation Age of Onset    Adopted: Yes    Family history unknown:  Yes     Allergies   Allergen Reactions    Amoxil [Amoxicillin] Other (comments)     Upset stomach and diarrhea    Codeine Nausea and Vomiting       Current Facility-Administered Medications   Medication Dose Route Frequency    0.9% sodium chloride infusion  25 mL/hr IntraVENous CONTINUOUS    dextrose 5% - 0.45% NaCl with KCl 20 mEq/L infusion  25 mL/hr IntraVENous CONTINUOUS    sodium chloride (NS) flush 5-10 mL  5-10 mL IntraVENous Q8H    sodium chloride (NS) flush 5-10 mL  5-10 mL IntraVENous PRN    oxyCODONE-acetaminophen (PERCOCET) 5-325 mg per tablet 1 Tab  1 Tab Oral Q4H PRN    morphine injection 3-5 mg  3-5 mg IntraVENous Q1H PRN    naloxone (NARCAN) injection 0.4 mg  0.4 mg IntraVENous PRN    mupirocin (BACTROBAN) 2 % ointment   Both Nostrils BID    ceFAZolin in 0.9% NS (ANCEF) IVPB soln 2 g  2 g IntraVENous Q8H    sodium bicarbonate 8.4 % (1 mEq/mL) injection 50 mEq  50 mEq IntraVENous PRN    EPINEPHrine (ADRENALIN) 4,000 mcg in 0.9% sodium chloride 250 mL infusion  0.05-0.1 mcg/kg/min IntraVENous TITRATE    nitroglycerin (Tridil) 200 mcg/ml infusion   mcg/min IntraVENous TITRATE    PHENYLephrine (NEOSYNEPHRINE) 30,000 mcg in 0.9% sodium chloride 250 mL infusion   mcg/min IntraVENous TITRATE    lidocaine (PF) (XYLOCAINE) 100 mg/5 mL (2 %) injection syringe  mg   mg IntraVENous ONCE PRN    amiodarone (CORDARONE) tablet 200 mg  200 mg Oral Q12H    [START ON 4/22/2017] metoprolol tartrate (LOPRESSOR) tablet 25 mg  25 mg Oral Q12H    insulin regular (NOVOLIN R, HUMULIN R) 100 Units in 0.9% sodium chloride 100 mL infusion  1 Units/hr IntraVENous TITRATE    dextrose (D50W) injection syrg 12.5 g  25 mL IntraVENous PRN    [START ON 4/22/2017] aspirin chewable tablet 81 mg  81 mg Oral DAILY    magnesium sulfate 1 g/100 ml IVPB (premix or compounded)  1 g IntraVENous PRN    potassium chloride 10 mEq in 50 ml IVPB  10 mEq IntraVENous PRN    midazolam (VERSED) injection 1 mg  1 mg IntraVENous Q1H PRN    meperidine (DEMEROL) injection 25 mg  25 mg IntraVENous Q1H PRN    chlorhexidine (PERIDEX) 0.12 % mouthwash 10 mL  10 mL Oral BID    tuberculin injection 5 Units  5 Units IntraDERMal ONCE    famotidine (PF) (PEPCID) injection 20 mg  20 mg IntraVENous Q12H     Facility-Administered Medications Ordered in Other Encounters   Medication Dose Route Frequency    midazolam (VERSED) injection    PRN    lidocaine (PF) (XYLOCAINE) 20 mg/mL (2 %) injection   IntraVENous PRN    SUFentanil (SUFENTA) injection   IntraVENous PRN    propofol (DIPRIVAN) 10 mg/mL injection    PRN    succinylcholine (ANECTINE) injection   IntraVENous PRN    rocuronium (ZEMURON) injection   IntraVENous PRN    vecuronium (NORCURON) injection   IntraVENous PRN    nitroglycerin (Tridil) 200 mcg/ml infusion    CONTINUOUS    aminocaproic acid (AMICAR) 5 g in 0.9% sodium chloride 250 mL infusion  5 g IntraVENous CONTINUOUS    aminocaproic acid (AMICAR) 5 g in dextrose 5% 250 mL infusion  5 g IntraVENous CONTINUOUS    lactated ringers infusion    CONTINUOUS    lactated ringers infusion    CONTINUOUS    0.9% sodium chloride infusion    CONTINUOUS    heparin (porcine) 1,000 unit/mL injection    PRN    EPINEPHrine (ADRENALIN) 4,000 mcg in 0.9% sodium chloride 250 mL infusion  4,000 mcg IntraVENous CONTINUOUS    protamine injection    PRN    PHENYLephrine (NEOSYNEPHRINE) 30,000 mcg in 0.9% sodium chloride 250 mL infusion  30,000 mcg IntraVENous CONTINUOUS         Objective:     Vitals:    04/21/17 0540 04/21/17 0624 04/21/17 1413 04/21/17 1414   BP: 127/79 127/79     Pulse: 72 72 83    Resp: 18   (P) 15   Temp: 98 °F (36.7 °C)      SpO2: 95%  95%    Weight: 261 lb 11 oz (118.7 kg)      Height: 5' 10\" (1.778 m)          Intake and Output:      04/21 0701 - 04/21 1900  In: 4189 [I.V.:800]  Out: 550 [Urine:550]    Physical Exam:          Constitutional:  Sedated, orally intubated and mechanically ventilated. EENMT:  Sclera clear, pupils equal, oral mucosa moist and orally intubated  Respiratory: clear  Cardiovascular:  RRR with no M,G,R;  Gastrointestinal:  soft; no bowel sounds present  Musculoskeletal:  warm with no cyanosis, no lower leg edema. Sedated with no movements. SKIN:  no jaundice or ecchymosis   Neurologic:  sedated but no gross neuro deficits  Psychiatric:  sedated and unable to assess at this time    CHEST XRAY:  Pending       LINES:  ETT, sal, swan arik, arterial line, chest tubes times 2 in R lateral chest     DRIPS:  Epi, jonathan     CI:      Ventilator Settings  Mode FIO2 Rate Tidal Volume Pressure PEEP                        Peak airway pressure:     Minute ventilation:         Assessment and Plan :  (Medical Decision Making)     Hospital Problems  Date Reviewed: 4/21/2017          Codes Class Noted POA    Mitral valve regurgitation ICD-10-CM: I34.0  ICD-9-CM: 424.0  4/21/2017 Yes        Encounter for weaning from ventilator [facility]) ICD-10-CM: Z99.11  ICD-9-CM: V46.13  4/21/2017 No        Essential hypertension ICD-10-CM: I10  ICD-9-CM: 401.9  3/7/2017 Yes        Shortness of breath ICD-10-CM: R06.02  ICD-9-CM: 786.05  3/7/2017 Yes        BRITTNEY (obstructive sleep apnea) ICD-10-CM: G47.33  ICD-9-CM: 327.23  3/7/2017 Yes        GERD (gastroesophageal reflux disease) ICD-10-CM: K21.9  ICD-9-CM: 530.81  3/7/2017 Yes              Plan:   --Wean mechanical ventilation per protocol. --Bronchodilators per protocol. --Incentive spirometry every hour post extubation.   -   --Review CXR    More than 50% of the time documented was spent in face-to-face contact with the patient and in the care of the patient on the floor/unit where the patient is located. Thank you for this referral.  We appreciate the opportunity to participate in this patient's care. Will follow along with you.     Todd Aponte MD

## 2017-04-21 NOTE — IP AVS SNAPSHOT
Current Discharge Medication List  
  
START taking these medications Dose & Instructions Dispensing Information Comments Morning Noon Evening Bedtime  
 guaiFENesin 1,200 mg Ta12 ER tablet Commonly known as:  [employer] Over the Counter Mucinex- take to keep secretions thin and easy to cough up Quantity:  30 Tab Refills:  0  
     
   
   
   
  
 ibuprofen 800 mg tablet Commonly known as:  MOTRIN Your next dose is: Today Dose:  800 mg Take 1 Tab by mouth four (4) times daily for 14 days. Quantity:  56 Tab Refills:  0  
     
   
   
6:00 pm  
   
  
 methyl salicylate-menthol 00-20 % topical cream  
Commonly known as:  [employer] Over the counter BenGay use prn Quantity:  1 Tube Refills:  0  
     
   
   
   
  
 metoprolol tartrate 25 mg tablet Commonly known as:  LOPRESSOR Your next dose is: Tonight Dose:  25 mg Take 1 Tab by mouth every twelve (12) hours. Quantity:  60 Tab Refills:  5  
     
   
   
  
   
  
 tapentadol 100 mg tablet Commonly known as:  Shiva Hams Dose:  75 mg Take 75 mg by mouth every six (6) hours as needed for Pain. Max Daily Amount: 300 mg. Quantity:  60 Tab Refills:  0 CONTINUE these medications which have CHANGED Dose & Instructions Dispensing Information Comments Morning Noon Evening Bedtime  
 potassium chloride 20 mEq tablet Commonly known as:  K-DUR, KLOR-CON What changed:  when to take this Your next dose is:  Tomorrow Dose:  20 mEq Take 1 Tab by mouth daily for 6 days. Quantity:  6 Tab Refills:  0  
     
  
   
   
   
  
 tadalafil 5 mg tablet Commonly known as:  CIALIS What changed:   
- when to take this 
- reasons to take this Dose:  5 mg Take 1 Tab by mouth daily. Quantity:  30 Tab Refills:  11 CONTINUE these medications which have NOT CHANGED Dose & Instructions Dispensing Information Comments Morning Noon Evening Bedtime  
 acyclovir 200 mg capsule Commonly known as:  ZOVIRAX Your next dose is:  Tomorrow Dose:  200 mg Take 200 mg by mouth daily. Refills:  0  
     
  
   
   
   
  
 aspirin delayed-release 81 mg tablet Your next dose is:  Tomorrow Dose:  81 mg Take 81 mg by mouth every morning. Refills:  0 ASTEPRO NA Your next dose is:  Tomorrow Dose:  2 Spray 2 Sprays by Nasal route daily. Refills:  0 CLARITIN 10 mg tablet Generic drug:  loratadine Dose:  10 mg Take 10 mg by mouth daily as needed for Allergies. Refills:  0  
     
   
   
   
  
 omeprazole 40 mg capsule Commonly known as:  PRILOSEC Your next dose is:  Tomorrow Dose:  40 mg Take 40 mg by mouth every morning. Refills:  0 STOP taking these medications   
 chlorhexidine 4 % liquid Commonly known as:  HIBICLENS  
   
  
 furosemide 40 mg tablet Commonly known as:  LASIX  
   
  
 mupirocin calcium 2 % nasal ointment Commonly known as:  [facility] Where to Get Your Medications Information on where to get these meds will be given to you by the nurse or doctor. ! Ask your nurse or doctor about these medications  
  guaiFENesin 1,200 mg Ta12 ER tablet  
 ibuprofen 800 mg tablet  
 methyl salicylate-menthol 89-14 % topical cream  
 metoprolol tartrate 25 mg tablet  
 potassium chloride 20 mEq tablet  
 tapentadol 100 mg tablet

## 2017-04-21 NOTE — ANESTHESIA POSTPROCEDURE EVALUATION
Post-Anesthesia Evaluation and Assessment    Patient: Lyn Lunsford MRN: [id number]  SSN: [id number]    YOB: 1965  Age: 50 y.o. Sex: male       Cardiovascular Function/Vital Signs  Visit Vitals    /58    Pulse 83    Temp 37.1 °C (98.7 °F)    Resp 15    Ht 5' 10\" (1.778 m)    Wt 118.7 kg (261 lb 11 oz)    SpO2 95%    BMI 37.55 kg/m2       Patient is status post general anesthesia for Procedure(s):  MITRAL VALVE REPAIR (MVR) MINIMALLY INVASIVE. CRYOABALATION OF INTERCOSTAL SPACES  ESOPHAGEAL TRANS ECHOCARDIOGRAM.    Nausea/Vomiting: None    Postoperative hydration reviewed and adequate. Pain:  Pain Scale 1: Visual (04/21/17 1413)  Pain Intensity 1: 0 (04/21/17 1413)   Managed    Neurological Status:   Neuro (WDL): Within Defined Limits (04/21/17 0601)  Neuro  Neurologic State: (P) Anesthetized; Eyes do not open to any stimulus (04/21/17 1413)  Orientation Level: (P) Unable to verbalize (04/21/17 1413)  Cognition: (P) No command following; Unable to assess (comment) (04/21/17 1413)  Speech: (P) Intubated (04/21/17 1413)  Assessment L Pupil: (P) Round;Sluggish (04/21/17 1413)  Size L Pupil (mm): (P) 2 (04/21/17 1413)  Assessment R Pupil: (P) Round;Sluggish (04/21/17 1413)  Size R Pupil (mm): (P) 2 (04/21/17 1413)  LUE Motor Response: (P) Flaccid (04/21/17 1413)  LLE Motor Response: (P) Flaccid (04/21/17 1413)  RUE Motor Response: (P) Flaccid (04/21/17 1413)  RLE Motor Response: (P) Flaccid (04/21/17 1413)   sedated    Mental Status and Level of Consciousness: Sedated    Pulmonary Status:   O2 Device: Other (comment) (ETT. ) (04/21/17 1428)   Adequate oxygenation and airway patent, FRANSISCO successfully exchanged over a stylet to an 8 single lumen ETT    Complications related to anesthesia: None    Post-anesthesia assessment completed.  No immediate concerns    Signed By: Corina Lee MD     April 21, 2017

## 2017-04-21 NOTE — INTERDISCIPLINARY ROUNDS
Interdisciplinary team rounds were held 4/21/2017 with the following team members:Nursing, Physician, Respiratory Therapy and Clinical Coordinator . Recover per unit standard, routine. Plan of care discussed. See clinical pathway and/or care plan for interventions and desired outcomes.

## 2017-04-21 NOTE — PROGRESS NOTES
Patient out from operating room and placed on the ventilator on documented settings. Patient is orally intubated with a # 8.0 ET Tube secured at the 24 cm ambika at the lip. Breath sounds are clear. Trachea is midline. Negative for subcutaneous air, chest excursion is symmetric. Negative for pitting edema. Patient is also Negative for cyanosis. Patient has a Left Radial arterial line. Patient has 2  Chest tubes. All alarms are set and audible. Resuscitation bag is at the head of the bed. Ventilator Settings  Mode FIO2 Rate Tidal Volume Pressure PEEP I:E Ratio   SIMV, Pressure support, Volume control  70 %    550 ml  10 cm H2O  8 cm H20         Peak airway pressure: 22 cm H2O   Minute ventilation: 8.4 l/min     ABG: No results for input(s): PH, PCO2, PO2, HCO3 in the last 72 hours.       Yoligarmarciano Reading, RT

## 2017-04-21 NOTE — BRIEF OP NOTE
BRIEF OPERATIVE NOTE    Date of Procedure: 4/21/2017   Preoperative Diagnosis: Acute mitral insufficiency [I34.0]  Postoperative Diagnosis: Acute mitral insufficiency [I34.0]    Procedure(s):  MITRAL VALVE REPAIR (MVR) MINIMALLY INVASIVE. CRYOABALATION OF INTERCOSTAL SPACES  ESOPHAGEAL TRANS ECHOCARDIOGRAM  Surgeon(s) and Role:  Panel 1:     * Michelle Buenrostro MD - Primary     * Nicholos Schilder, MD - Assisting    Panel 2:     * Chey Carter MD - Primary         Assistant Staff:       Surgical Staff:  Circ-1: Luis Enrique Pichardo RN  Circ-Relief: Pranav Rivas RN; Ariadna Mcfarland RN  Perfusionist: Laina Green  Scrub Tech-1: Adrienne Beaver  Scrub RN-1: Jordyn Pack RN; Madelin Ortega RN  Scrub RN-Relief: Pranav Rivas RN  Event Time In   Incision Start 3072   Incision Close 1347     Anesthesia: General   Estimated Blood Loss: minimal  Specimens:   ID Type Source Tests Collected by Time Destination   1 : P2 mitral valve Preservative Mitral Valve  Michelle Buenrostro MD 4/21/2017 1201 Pathology      Findings: mr, flail P2   Complications: none  Implants:   Implant Name Type Inv.  Item Serial No.  Lot No. LRB No. Used Action   RING ANNULPLSTY MITRL PHY 32MM --  - [id number]   RING ANNULPLSTY MITRL PHY 32MM --  [id number] [employer]   N/A 1 Implanted

## 2017-04-21 NOTE — IP AVS SNAPSHOT
[place] [facility] 
 
 
 [address]  [address] [facility] 
[phone] Patient: Shanon Pabon MRN: [id number] JUY:[id number] You are allergic to the following Allergen Reactions Amoxil (Amoxicillin) Other (comments) Upset stomach and diarrhea Codeine Nausea and Vomiting Immunizations Administered for This Admission Name Date  
 TB Skin Test (PPD) Intradermal 4/21/2017 Recent Documentation Height Weight BMI Smoking Status 1.778 m 123.1 kg 38.94 kg/m2 Never Smoker Emergency Contacts Name Discharge Info Relation Home Work Mobile Ladonna Blanton  Spouse [3] [phone] About your hospitalization You were admitted on:  April 21, 2017 You last received care in the:  [facility] 2 CV STEPDOWN You were discharged on:  April 25, 2017 Unit phone number:  [phone] Why you were hospitalized Your primary diagnosis was:  Mitral Valve Regurgitation Your diagnoses also included:  Encounter For Weaning From Ventilator (Hcc), Kt (Obstructive Sleep Apnea), Shortness Of Breath, Gerd (Gastroesophageal Reflux Disease), Essential Hypertension, Hypoxemia, Thyroid Nodule, Right Shoulder Pain Providers Seen During Your Hospitalizations Provider Role Specialty Primary office phone Raina Kelley MD Attending Provider Cardiothoracic Surgery [phone] Your Primary Care Physician (PCP) Primary Care Physician Office Phone Office Fax 301 [address] [phone] Follow-up Information Follow up With Details Comments Contact Info [address]  Will call you within 24 hours to schedule visit for nursing and physical therapy [address] [address] 
[phone] Ama Ball NP  As needed [address] 
[phone] O CARDIOPULM REHAB  Will call you for appointment. [phone] 
[phone] Tasia Rivas MD On 5/17/2017 Wednesday, May 17 @ 2:20 [address] [facility] [address] [facility] CARDIOVASC THORACIC [address] [facility] [id number] [phone] Belle Deal MD On 5/1/2017 Monday, May 1 @11:15 at [facility] [address] [facility] [address] 
[phone] O SLEEP STUDY LAB On 6/26/2017 Monday, June 26-   Appointment information will be mailed [address] [phone] 
[phone] [facility] Schedule an appointment as soon as possible for a visit Schedule follow up for shoulder pain. [address] [address] 
[phone] Your Appointments Monday May 01, 2017 11:15 AM EDT TRANSITIONAL CARE MANAGEMENT with Belle Deal MD  
[address] [facility] [address] Cardiology ([address] [facility]) [address]  
[phone] Wednesday May 17, 2017  2:20 PM EDT Discharge Followup with Tasia Rivas MD  
[address] [facility] ([facility][note 3] [address] [place] [id number] [phone] Current Discharge Medication List  
  
START taking these medications Dose & Instructions Dispensing Information Comments Morning Noon Evening Bedtime  
 guaiFENesin 1,200 mg Ta12 ER tablet Commonly known as:  [employer] Over the Counter Mucinex- take to keep secretions thin and easy to cough up Quantity:  30 Tab Refills:  0  
     
   
   
   
  
 ibuprofen 800 mg tablet Commonly known as:  MOTRIN Your next dose is: Today Dose:  800 mg Take 1 Tab by mouth four (4) times daily for 14 days. Quantity:  56 Tab Refills:  0  
     
   
   
6:00 pm  
   
  
 methyl salicylate-menthol 22-97 % topical cream  
 Commonly known as:  Edilma Mota Over the counter BenGay use prn Quantity:  1 Tube Refills:  0  
     
   
   
   
  
 metoprolol tartrate 25 mg tablet Commonly known as:  LOPRESSOR Your next dose is: Tonight Dose:  25 mg Take 1 Tab by mouth every twelve (12) hours. Quantity:  60 Tab Refills:  5  
     
   
   
  
   
  
 tapentadol 100 mg tablet Commonly known as:  Hamp Aldair Dose:  75 mg Take 75 mg by mouth every six (6) hours as needed for Pain. Max Daily Amount: 300 mg. Quantity:  60 Tab Refills:  0 CONTINUE these medications which have CHANGED Dose & Instructions Dispensing Information Comments Morning Noon Evening Bedtime  
 potassium chloride 20 mEq tablet Commonly known as:  K-DUR, KLOR-CON What changed:  when to take this Your next dose is:  Tomorrow Dose:  20 mEq Take 1 Tab by mouth daily for 6 days. Quantity:  6 Tab Refills:  0  
     
  
   
   
   
  
 tadalafil 5 mg tablet Commonly known as:  CIALIS What changed:   
- when to take this 
- reasons to take this Dose:  5 mg Take 1 Tab by mouth daily. Quantity:  30 Tab Refills:  11 CONTINUE these medications which have NOT CHANGED Dose & Instructions Dispensing Information Comments Morning Noon Evening Bedtime  
 acyclovir 200 mg capsule Commonly known as:  ZOVIRAX Your next dose is:  Tomorrow Dose:  200 mg Take 200 mg by mouth daily. Refills:  0  
     
  
   
   
   
  
 aspirin delayed-release 81 mg tablet Your next dose is:  Tomorrow Dose:  81 mg Take 81 mg by mouth every morning. Refills:  0 ASTEPRO NA Your next dose is:  Tomorrow Dose:  2 Spray 2 Sprays by Nasal route daily. Refills:  0 CLARITIN 10 mg tablet Generic drug:  loratadine  Dose:  10 mg  
 Take 10 mg by mouth daily as needed for Allergies. Refills:  0  
     
   
   
   
  
 omeprazole 40 mg capsule Commonly known as:  PRILOSEC Your next dose is:  Tomorrow Dose:  40 mg Take 40 mg by mouth every morning. Refills:  0 STOP taking these medications   
 chlorhexidine 4 % liquid Commonly known as:  HIBICLENS  
   
  
 furosemide 40 mg tablet Commonly known as:  LASIX  
   
  
 mupirocin calcium 2 % nasal ointment Commonly known as:  [facility] Where to Get Your Medications Information on where to get these meds will be given to you by the nurse or doctor. ! Ask your nurse or doctor about these medications  
  guaiFENesin 1,200 mg Ta12 ER tablet  
 ibuprofen 800 mg tablet  
 methyl salicylate-menthol 54-66 % topical cream  
 metoprolol tartrate 25 mg tablet  
 potassium chloride 20 mEq tablet  
 tapentadol 100 mg tablet Discharge Instructions DISCHARGE SUMMARY from Nurse The following personal items are in your possession at time of discharge: 
 
Dental Appliances: None Visual Aid: Glasses Home Medications: None Jewelry: None Clothing: None Other Valuables: None PATIENT INSTRUCTIONS: 
 
 
F-face looks uneven A-arms unable to move or move unevenly S-speech slurred or non-existent T-time-call 911 as soon as signs and symptoms begin-DO NOT go Back to bed or wait to see if you get better-TIME IS BRAIN. Warning Signs of HEART ATTACK Call 911 if you have these symptoms: 
? Chest discomfort. Most heart attacks involve discomfort in the center of the chest that lasts more than a few minutes, or that goes away and comes back. It can feel like uncomfortable pressure, squeezing, fullness, or pain. ? Discomfort in other areas of the upper body.  Symptoms can include pain or discomfort in one or both arms, the back, neck, jaw, or stomach. ? Shortness of breath with or without chest discomfort. ? Other signs may include breaking out in a cold sweat, nausea, or lightheadedness. Don't wait more than five minutes to call [address]! Fast action can save your life. Calling 911 is almost always the fastest way to get lifesaving treatment. Emergency Medical Services staff can begin treatment when they arrive  up to an hour sooner than if someone gets to the hospital by car. The discharge information has been reviewed with the patient and spouse. The patient and spouse verbalized understanding. Discharge medications reviewed with the patient and spouse and appropriate educational materials and side effects teaching were provided. Discharge Instructions Attachments/References MITRAL VALVE REPAIR OR REPLACEMENT: POST-OP (ENGLISH) HEALTHY DIET: HEART (ENGLISH) HEART ATTACK: MEDICINE TO REDUCE RISK (ENGLISH) CARDIAC REHABILITATION (ENGLISH) SECONDHAND SMOKE (ENGLISH) SMOKING: STOPPING (ENGLISH) TAPENTADOL (BY MOUTH) (ENGLISH) METOPROLOL (BY MOUTH) (ENGLISH) IBUPROFEN (BY MOUTH) (ENGLISH) Discharge Orders Procedure Order Date Status Priority Quantity Spec Type Associated Dx OCCUPATIONAL THERAPY 04/25/17 1319 Normal Routine 1  Chronic right shoulder pain [[id number]] Comments:  Acute on Chronic RUE pain post op- patient was seen while inpatient by both PT and OT. Please continue as an Outpatient [employer] Announcement We are excited to announce that we are making your provider's discharge notes available to you in [employer]. You will see these notes when they are completed and signed by the physician that discharged you from your recent hospital stay.   If you have any questions or concerns about any information you see in [employer]t, please call the [facility] Department where you were seen or reach out to your Primary Care Provider for more information about your plan of care. Introducing [facility]! Sully Jaramillo introduces [employer] patient portal. Now you can access parts of your medical record, email your doctor's office, and request medication refills online. 1. In your internet browser, go to [email or web address]. My Health Direct/Spotlight At Night 2. Click on the First Time User? Click Here link in the Sign In box. You will see the New Member Sign Up page. 3. Enter your [employer] Access Code exactly as it appears below. You will not need to use this code after youve completed the sign-up process. If you do not sign up before the expiration date, you must request a new code. · [employer] Access Code: [email or web address] Expires: 6/4/2017  3:33 PM 
 
4. Enter the last four digits of your Social Security Number (xxxx) and Date of Birth (mm/dd/yyyy) as indicated and click Submit. You will be taken to the next sign-up page. 5. Create a SocialBuy ID. This will be your SocialBuy login ID and cannot be changed, so think of one that is secure and easy to remember. 6. Create a [employer] password. You can change your password at any time. 7. Enter your Password Reset Question and Answer. This can be used at a later time if you forget your password. 8. Enter your e-mail address. You will receive e-mail notification when new information is available in [address] 9. Click Sign Up. You can now view and download portions of your medical record. 10. Click the Download Summary menu link to download a portable copy of your medical information. If you have questions, please visit the Frequently Asked Questions section of the [employer] website. Remember, SocialBuy is NOT to be used for urgent needs. For medical emergencies, dial 911. Now available from your iPhone and Android! General Information Please provide this summary of care documentation to your next provider. Patient Signature:  ____________________________________________________________ Date:  ____________________________________________________________  
  
Gera Breath Provider Signature:  ____________________________________________________________ Date:  ____________________________________________________________ More Information Mitral Valve Repair or Replacement: What to Expect at Home Your Recovery You have had surgery to repair or replace your heart's mitral valve. Your doctor did the surgery through a cut, called an incision, in your chest. 
You will feel tired and sore for the first few weeks after surgery. You may have some brief, sharp pains on either side of your chest. Your chest, shoulders, and upper back may ache. The incision in your chest may be sore or swollen. These symptoms usually get better after 4 to 6 weeks. You will probably be able to do many of your usual activities after 4 to 6 weeks. But for at least 6 weeks, you will not be able to lift heavy objects or do activities that strain your chest or upper arm muscles. At first you may notice that you get tired easily and need to rest often. It may take 1 to 2 months to get your energy back. Some people find that they are more emotional after this surgery. You may cry easily or show emotion in ways that are unusual for you. This is common and may last for up to a year. Some people get depressed after this surgery. Talk with your doctor if you have sadness that continues or you are concerned about how you are feeling. Treatment and other support can help you feel better. Even though the surgery repaired your mitral valve, it is still important to eat a heart-healthy diet, get regular exercise, not smoke, take your heart medicines, and reduce stress.  Your doctor may recommend that you work with a nurse, a dietitian, and a physical therapist to make these changes. This is sometimes called cardiac rehabilitation. This care sheet gives you a general idea about how long it will take for you to recover. But each person recovers at a different pace. Follow the steps below to get better as quickly as possible. How can you care for yourself at home? Activity · Rest when you feel tired. Getting enough sleep will help you recover. Try to sleep on your back while your breastbone (sternum) heals. This usually takes about 4 to 6 weeks. · Try to walk each day. Start by walking a little more than you did the day before. Bit by bit, increase the amount you walk. Walking boosts blood flow and helps prevent pneumonia and constipation. · Avoid strenuous activities, such as bicycle riding, jogging, weight lifting, or heavy aerobic exercise, until your doctor says it is okay. · For 3 months, avoid activities that strain your chest or upper arm muscles. This includes pushing a  or vacuum, mopping floors, or swinging a golf club or tennis racquet. · For at least 6 weeks, avoid lifting anything that would make you strain. This may include a child, heavy grocery bags and milk containers, a heavy briefcase or backpack, or cat litter or dog food bags. · Hold a pillow firmly over your chest incision when you cough or take deep breaths. This will support your chest and reduce your pain. · Do breathing exercises at home as instructed by your doctor. This will help prevent pneumonia. · Ask your doctor when you can drive again. · You will probably need to take 4 to 12 weeks off from work. It depends on the type of work you do and how you feel. · You may shower as usual. Pat the incision dry. Do not take a bath for the first 3 weeks, or until your doctor tells you it is okay. · Do not swim or use a hot tub for at least 1 month, or until your doctor says it is okay. · Ask your doctor when it is okay for you to have sex. Diet · Eat a heart-healthy, low-salt diet. If you have not been eating this way, talk to your doctor. You also may want to talk to a dietitian. A dietitian can help you plan meals and learn about healthy foods. · Drink plenty of fluids (unless your doctor tells you not to). · You may notice that your bowel movements are not regular right after your surgery. This is common. Try to avoid constipation and straining with bowel movements. You may want to take a fiber supplement every day. If you have not had a bowel movement after a couple of days, ask your doctor about taking a mild laxative. Medicines · Your doctor will tell you if and when you can restart your medicines. He or she will also give you instructions about taking any new medicines. · If you take blood thinners, such as warfarin (Coumadin), clopidogrel (Plavix), or aspirin, be sure to talk to your doctor. He or she will tell you if and when to start taking those medicines again. Make sure that you understand exactly what your doctor wants you to do. · Be safe with medicines. Take your medicines exactly as prescribed. Call your doctor if you think you are having a problem with your medicine. · Take pain medicines exactly as directed. ¨ If the doctor gave you a prescription medicine for pain, take it as prescribed. ¨ If you are not taking a prescription pain medicine, ask your doctor if you can take an over-the-counter medicine. ¨ Do not take aspirin, ibuprofen (Advil, Motrin), naproxen (Aleve), or other nonsteroidal anti-inflammatory drugs (NSAIDs) unless your doctor says it is okay. · If you think your pain medicine is making you sick to your stomach: 
¨ Take your medicine after meals (unless your doctor has told you not to). ¨ Ask your doctor for a different pain medicine. · If your doctor prescribed antibiotics, take them as directed.  Do not stop taking them just because you feel better. You need to take the full course of antibiotics. · Your doctor may give you a blood thinner to prevent blood clots. If you take a blood thinner, be sure you get instructions about how to take your medicine safely. Blood thinners can cause serious bleeding problems. Incision care · If you have strips of tape on the incision the doctor made, leave the tape on for a week or until it falls off. · Wash the area daily with warm, soapy water and pat it dry. Don't use hydrogen peroxide or alcohol, which can slow healing. You may cover the area with a gauze bandage if it weeps or rubs against clothing. Change the bandage every day. · Keep the area clean and dry. Other instructions · Keep track of your weight. Weigh yourself every day at the same time of day, on the same scale, in the same amount of clothing. A sudden increase in weight can be a sign of a problem with your heart. Tell your doctor if you suddenly gain weight, such as 3 pounds or more in 2 to 3 days. · Be sure to tell all your doctors and your dentist that you have had mitral valve surgery. This is important, because you may need to take antibiotics before certain procedures to prevent infection. Follow-up care is a key part of your treatment and safety. Be sure to make and go to all appointments, and call your doctor if you are having problems. It's also a good idea to know your test results and keep a list of the medicines you take. When should you call for help? Call 911 anytime you think you may need emergency care. For example, call if: 
· You passed out (lost consciousness). · You have severe trouble breathing. · You have sudden chest pain and shortness of breath, or you cough up blood. · You have severe pain in your chest. 
· You have signs of a stroke. These may include: 
¨ Sudden numbness, paralysis, or weakness in your face, arm, or leg, especially on only one side of your body. ¨ New problems with walking or balance. ¨ Sudden vision changes. ¨ New problems speaking or understanding simple statements, or feeling confused. ¨ A sudden, severe headache that is different from past headaches. · You have symptoms of a heart attack. These may include: ¨ Chest pain or pressure, or a strange feeling in the chest. 
¨ Sweating. ¨ Shortness of breath. ¨ Nausea or vomiting. ¨ Pain, pressure, or a strange feeling in the back, neck, jaw, or upper belly or in one or both shoulders or arms. ¨ Lightheadedness or sudden weakness. ¨ A fast or irregular heartbeat. After you call 911, the  may tell you to chew 1 adult-strength or 2 to 4 low-dose aspirin. Wait for an ambulance. Do not try to drive yourself. Call your doctor now or seek immediate medical care if: 
· You have pain that does not get better after you take pain medicine. · You have loose stitches, or your incision comes open. · Bright red blood has soaked through the bandage over your incision. · You have signs of infection, such as: 
¨ Increased pain, swelling, warmth, or redness. ¨ Red streaks leading from the incision. ¨ Pus draining from the incision. ¨ A fever. · You have signs of a blood clot, such as: 
¨ Pain in your calf, back of the knee, thigh, or groin. ¨ Redness and swelling in your leg or groin. · Your heartbeat feels very fast or slow, skips beats, or flutters. · You are dizzy or lightheaded, or you feel like you may faint. · You have new or increased shortness of breath. Watch closely for changes in your health, and be sure to contact your doctor if: 
· You gain weight suddenly, such as 3 pounds or more in 2 to 3 days. · You have increased swelling in your legs, ankles, or feet. · You have any concerns about your incision. · You feel very sad or have other signs of depression, such as trouble sleeping or eating.  
· You have questions about diet, exercise, quitting smoking, or stress reduction after surgery. Where can you learn more? Go to http://karan-kecia.info/. Enter S522 in the search box to learn more about \"Mitral Valve Repair or Replacement: What to Expect at Home. \" Current as of: January 27, 2016 Content Version: 11.2 © 5311-2544 Who@. Care instructions adapted under license by [employer] (which disclaims liability or warranty for this information). If you have questions about a medical condition or this instruction, always ask your healthcare professional. [address] any warranty or liability for your use of this information. Heart-Healthy Diet: Care Instructions Your Care Instructions A heart-healthy diet has lots of vegetables, fruits, nuts, beans, and whole grains, and is low in salt. It limits foods that are high in saturated fat, such as meats, cheeses, and fried foods. It may be hard to change your diet, but even small changes can lower your risk of heart attack and heart disease. Follow-up care is a key part of your treatment and safety. Be sure to make and go to all appointments, and call your doctor if you are having problems. It's also a good idea to know your test results and keep a list of the medicines you take. How can you care for yourself at home? Watch your portions · Learn what a serving is. A \"serving\" and a \"portion\" are not always the same thing. Make sure that you are not eating larger portions than are recommended. For example, a serving of pasta is ½ cup. A serving size of meat is 2 to 3 ounces. A 3-ounce serving is about the size of a deck of cards. Measure serving sizes until you are good at [place]" them. Keep in mind that restaurants often serve portions that are 2 or 3 times the size of one serving. · To keep your energy level up and keep you from feeling hungry, eat often but in smaller portions. · Eat only the number of calories you need to stay at a healthy weight. If you need to lose weight, eat fewer calories than your body burns (through exercise and other physical activity). Eat more fruits and vegetables · Eat a variety of fruit and vegetables every day. Dark green, deep orange, red, or yellow fruits and vegetables are especially good for you. Examples include spinach, carrots, peaches, and berries. · Keep carrots, celery, and other veggies handy for snacks. Buy fruit that is in season and store it where you can see it so that you will be tempted to eat it. · Cook dishes that have a lot of veggies in them, such as stir-fries and soups. Limit saturated and trans fat · Read food labels, and try to avoid saturated and trans fats. They increase your risk of heart disease. Trans fat is found in many processed foods such as cookies and crackers. · Use olive or canola oil when you cook. Try cholesterol-lowering spreads, such as Benecol or Take Control. · Bake, broil, grill, or steam foods instead of frying them. · Choose lean meats instead of high-fat meats such as hot dogs and sausages. Cut off all visible fat when you prepare meat. · Eat fish, skinless poultry, and meat alternatives such as soy products instead of high-fat meats. Soy products, such as tofu, may be especially good for your heart. · Choose low-fat or fat-free milk and dairy products. Eat fish · Eat at least two servings of fish a week. Certain fish, such as salmon and tuna, contain omega-3 fatty acids, which may help reduce your risk of heart attack. Eat foods high in fiber · Eat a variety of grain products every day. Include whole-grain foods that have lots of fiber and nutrients. Examples of whole-grain foods include oats, whole wheat bread, and brown rice. · Buy whole-grain breads and cereals, instead of white bread or pastries. Limit salt and sodium · Limit how much salt and sodium you eat to help lower your blood pressure. · Taste food before you salt it. Add only a little salt when you think you need it. With time, your taste buds will adjust to less salt. · Eat fewer snack items, fast foods, and other high-salt, processed foods. Check food labels for the amount of sodium in packaged foods. · Choose low-sodium versions of canned goods (such as soups, vegetables, and beans). Limit sugar · Limit drinks and foods with added sugar. These include candy, desserts, and soda pop. Limit alcohol · Limit alcohol to no more than 2 drinks a day for men and 1 drink a day for women. Too much alcohol can cause health problems. When should you call for help? Watch closely for changes in your health, and be sure to contact your doctor if: 
· You would like help planning heart-healthy meals. Where can you learn more? Go to http://karanPropeller Healthkecia.info/. Enter V137 in the search box to learn more about \"Heart-Healthy Diet: Care Instructions. \" Current as of: January 27, 2016 Content Version: 11.2 © 7661-2189 [employer]. Care instructions adapted under license by [employer] (which disclaims liability or warranty for this information). If you have questions about a medical condition or this instruction, always ask your healthcare professional. [address] any warranty or liability for your use of this information. Reducing Heart Attack Risk With Daily Medicine: Care Instructions Your Care Instructions Heart disease is the number one cause of death. If you are at risk for heart disease, there are many medicines that can reduce your risk. These include: · ACE inhibitors. These are a type of blood pressure medicine. They can reduce the risk of heart attacks and strokes if you are at high risk. · Statin medicines. These lower cholesterol. They can also reduce the risk of heart disease and strokes. · Aspirin. It can help certain people lower their risk of a heart attack or stroke. · Beta-blocker medicines. These are a type of blood pressure and heart medicine. They can reduce the chance of early death if you have had a heart attack. All medicines can cause side effects. So it is important to understand the pros and cons of any medicine you take. It is also important to take your medicines exactly as your doctor tells you to. Follow-up care is a key part of your treatment and safety. Be sure to make and go to all appointments, and call your doctor if you are having problems. It's also a good idea to know your test results and keep a list of the medicines you take. ACE inhibitors ACE (angiotensin-converting enzyme) inhibitors are used for three main reasons. They lower blood pressure, protect the kidneys, and prevent heart attacks and strokes. Examples include benazepril (Lotensin), lisinopril (Prinivil, Zestril), and ramipril (Altace). Before you start taking an ACE inhibitor, make sure your doctor knows if: 
· You are taking a water pill (diuretic). · You are taking potassium pills or using salt substitutes. · You are pregnant or breastfeeding. · You have had a kidney transplant or other kidney problems. ACE inhibitors can cause side effects. Call your doctor right away if you have: · Trouble breathing. · Swelling in your face, head, neck, or tongue. · Dizziness or lightheadedness. · A dry cough. Statins Statins lower cholesterol. Examples include atorvastatin (Lipitor), lovastatin (Mevacor), pravastatin (Pravachol), and simvastatin (Zocor). Before you start taking a statin, make sure your doctor knows if: 
· You have had a kidney transplant or other kidney problems. · You have liver disease. · You take any other prescription medicine, over-the-counter medicine, vitamins, supplements, or herbal remedies. · You are pregnant or breastfeeding. Statins can cause side effects. Call your doctor right away if you have: · New, severe muscle aches. · Brown urine. Aspirin Taking an aspirin every day can lower your risk for a heart attack. A heart attack occurs when a blood vessel in the heart gets blocked. When this happens, oxygen can't get to the heart muscle, and part of the heart dies. Aspirin can help prevent blood clots that can block the blood vessels. Talk to your doctor before you start taking aspirin every day. He or she may recommend that you take one low-dose aspirin (81 mg) tablet each day, with a meal and a full glass of water. Taking aspirin isn't right for everyone, because it can cause serious bleeding. And you may not be able to use aspirin if you: 
· Have asthma. · Have an ulcer or other stomach problem. · Take some other medicine (called a blood thinner) that prevents blood clots. · Are allergic to aspirin. Before having a surgery or procedure, tell your doctor or dentist that you take aspirin. He or she will tell you if you should stop taking aspirin beforehand. Make sure that you understand exactly what your doctor wants you to do. Aspirin can cause side effects. Call your doctor right away if you have: · Unusual bleeding or bruising. · Nausea, vomiting, or heartburn. · Black or bloody stools. Beta-blockers Beta-blockers are used for three main reasons. They lower blood pressure, relieve angina symptoms (such as chest pain or pressure), and reduce the chances of a second heart attack. They include atenolol (Tenormin), carvedilol (Coreg), and metoprolol (Lopressor). Before you start taking a beta-blocker, make sure your doctor knows if you have: · Severe asthma or frequent asthma attacks. · A very slow pulse (less than 55 beats a minute). Beta-blockers can cause side effects. Call your doctor right away if you have: · Wheezing or trouble breathing. · Dizziness or lightheadedness. · Asthma that gets worse. When should you call for help? Call 911 anytime you think you may need emergency care. For example, call if: 
· You passed out (lost consciousness). Call your doctor now or seek immediate medical care if: 
· You are wheezing or have trouble breathing. · You have swelling in your face, head, neck, or tongue. · You are dizzy or lightheaded, or you feel like you may faint. · You have severe muscle pain, weakness, or brown urine. · You have vision problems. · You have new bruises or blood spots under your skin. · Your stools are black and tarlike or have streaks of blood. Watch closely for changes in your health, and be sure to contact your doctor if: 
· You have ringing in your ears. · You feel very tired. · You have gas, constipation, or an upset stomach. Where can you learn more? Go to http://karan-kecia.info/. Enter R428 in the search box to learn more about \"Reducing Heart Attack Risk With Daily Medicine: Care Instructions. \" Current as of: March 28, 2016 Content Version: 11.2 © 0197-3403 [employer]. Care instructions adapted under license by [employer] (which disclaims liability or warranty for this information). If you have questions about a medical condition or this instruction, always ask your healthcare professional. [address] any warranty or liability for your use of this information. Cardiac Rehabilitation: Care Instructions Your Care Instructions Cardiac rehabilitation is a program for people who have a heart problem, such as a heart attack, heart failure, or a heart valve disease. The program includes exercise, lifestyle changes, education, and emotional support. Cardiac rehab can help you improve the quality of your life through better overall health. It can help you lose weight and feel better about yourself.  
On your cardiac rehab team, you may have your doctor, a nurse specialist, a dietitian, and a physical therapist. They will design your cardiac rehab program specifically for you. You will learn how to reduce your risk for heart problems, how to manage stress, and how to eat a heart-healthy diet. By the end of the program, you will be ready to maintain a healthier lifestyle on your own. Follow-up care is a key part of your treatment and safety. Be sure to make and go to all appointments, and call your doctor if you are having problems. It's also a good idea to know your test results and keep a list of the medicines you take. How can you care for yourself at home? · Take your medicines exactly as prescribed. Call your doctor if you think you are having a problem with your medicine. You will get more details on the specific medicines your doctor prescribes. · Weigh yourself every day if your doctor tells you to. Watch for sudden weight gain. Weigh yourself on the same scale with the same amount of clothing at the same time of day. · Plan your meals so that you are eating heart-healthy foods. ¨ Eat a variety of foods daily. Fresh fruits and vegetables and whole-grains are good choices. ¨ Limit your fat intake, especially saturated and trans fat. ¨ Limit salt (sodium). ¨ Increase fiber in your diet. ¨ Limit alcohol. · Learn how to take your pulse so that you can track your heart rate during exercise. · Always check with your doctor before you begin a new exercise program. 
· Warm up before you exercise and cool down afterward for at least 15 minutes each. This will help your heart gradually prepare for and recover from exercise and avoid pushing your heart too hard. · Stop exercising if you have any unusual discomfort, such as chest pain. · Do not smoke. Smoking can make heart problems worse. If you need help quitting, talk to your doctor about stop-smoking programs and medicines. These can increase your chances of quitting for good. When should you call for help? Call 911 anytime you think you may need emergency care. For example, call if: 
· You have severe trouble breathing. · You cough up pink, foamy mucus and you have trouble breathing. · You have symptoms of a heart attack. These may include: ¨ Chest pain or pressure, or a strange feeling in the chest. 
¨ Sweating. ¨ Shortness of breath. ¨ Nausea or vomiting. ¨ Pain, pressure, or a strange feeling in the back, neck, jaw, or upper belly or in one or both shoulders or arms. ¨ Lightheadedness or sudden weakness. ¨ A fast or irregular heartbeat. After you call 911, the  may tell you to chew 1 adult-strength or 2 to 4 low-dose aspirin. Wait for an ambulance. Do not try to drive yourself. · You have angina symptoms (such as chest pain or pressure) that do not go away with rest or are not getting better within 5 minutes after you take a dose of nitroglycerin. · You have symptoms of a stroke. These may include: 
¨ Sudden numbness, tingling, weakness, or loss of movement in your face, arm, or leg, especially on only one side of your body. ¨ Sudden vision changes. ¨ Sudden trouble speaking. ¨ Sudden confusion or trouble understanding simple statements. ¨ Sudden problems with walking or balance. ¨ A sudden, severe headache that is different from past headaches. · You passed out (lost consciousness). Call your doctor now or seek immediate medical care if: 
· You have new or increased shortness of breath. · You are dizzy or lightheaded, or you feel like you may faint. · You gain weight suddenly, such as 3 pounds or more in 2 to 3 days. (Your doctor may suggest a different range of weight gain.) · You have increased swelling in your legs, ankles, or feet. Watch closely for changes in your health, and be sure to contact your doctor if you have any problems. Where can you learn more? Go to http://moy.info/. Enter H905 in the search box to learn more about \"Cardiac Rehabilitation: Care Instructions. \" Current as of: May 5, 2016 Content Version: 11.2 © 8121-6127 [employer]. Care instructions adapted under license by [employer] (which disclaims liability or warranty for this information). If you have questions about a medical condition or this instruction, always ask your healthcare professional. [address] any warranty or liability for your use of this information. Secondhand Smoke: Care Instructions Your Care Instructions Secondhand smoke comes from the burning end of a cigarette, cigar, or pipe and the smoke that a smoker exhales. The smoke contains nicotine and many other harmful chemicals. Breathing secondhand smoke can cause or worsen health problems including cancer, asthma, coronary artery disease, and respiratory infections. It can make your eyes and nose burn and cause a sore throat. Secondhand smoke is especially bad for babies and young children whose lungs are still developing. Babies whose parents smoke are more likely to have ear infections, pneumonia, and bronchitis in the first few years of their lives. Secondhand smoke can make asthma symptoms worse in children. If you are pregnant, it is important that you not smoke and that you avoid secondhand smoke. You are more likely to give birth to a baby who weighs less than expected (low birth weight) if you smoke. And your baby may have a greater risk for sudden infant death syndrome (SIDS). Babies whose mothers are exposed to secondhand smoke during pregnancy have a higher risk for health problems. Follow-up care is a key part of your treatment and safety. Be sure to make and go to all appointments, and call your doctor if you are having problems. It's also a good idea to know your test results and keep a list of the medicines you take. How can you care for yourself at home? · Do not smoke or let anyone else smoke in your home. If people must smoke, ask them to go outside. · If people do smoke in your home, choose a room where you can open a window or use a fan to get the smoke outside. · Do not let anyone smoke in your car. If someone must smoke, pull over in a safe place and let him or her smoke away from the car. · Ask your employer to make sure that you have a smoke-free work area. · Make sure that your children are not exposed to secondhand smoke at day care, school, and after-school programs. · Try to choose nonsmoking bars, restaurants, and other public places when you go out. · Help your family and friends who smoke to quit by encouraging them to try. Tell them about treatment resources. Having support from others often helps. · If you smoke, quit. Quitting is hard, but there are ways to boost your chance of quitting tobacco for good. ¨ Use nicotine gum, patches, or lozenges. Call a quitline. Ask your doctor about stop-smoking programs and medicines. ¨ Keep trying. When should you call for help? Watch closely for changes in your health, and be sure to contact your doctor if you have any problems. Where can you learn more? Go to http://karan-kecia.info/. Enter L004 in the search box to learn more about \"Secondhand Smoke: Care Instructions. \" Current as of: May 26, 2016 Content Version: 11.2 © 0758-3948 [employer]. Care instructions adapted under license by [employer] (which disclaims liability or warranty for this information). If you have questions about a medical condition or this instruction, always ask your healthcare professional. [address] any warranty or liability for your use of this information. Stopping Smoking: Care Instructions Your Care Instructions Cigarette smokers crave the nicotine in cigarettes.  Giving it up is much harder than simply changing a habit. Your body has to stop craving the nicotine. It is hard to quit, but you can do it. There are many tools that people use to quit smoking. You may find that combining tools works best for you. There are several steps to quitting. First you get ready to quit. Then you get support to help you. After that, you learn new skills and behaviors to become a nonsmoker. For many people, a necessary step is getting and using medicine. Your doctor will help you set up the plan that best meets your needs. You may want to attend a smoking cessation program to help you quit smoking. When you choose a program, look for one that has proven success. Ask your doctor for ideas. You will greatly increase your chances of success if you take medicine as well as get counseling or join a cessation program. 
Some of the changes you feel when you first quit tobacco are uncomfortable. Your body will miss the nicotine at first, and you may feel short-tempered and grumpy. You may have trouble sleeping or concentrating. Medicine can help you deal with these symptoms. You may struggle with changing your smoking habits and rituals. The last step is the tricky one: Be prepared for the smoking urge to continue for a time. This is a lot to deal with, but keep at it. You will feel better. Follow-up care is a key part of your treatment and safety. Be sure to make and go to all appointments, and call your doctor if you are having problems. Its also a good idea to know your test results and keep a list of the medicines you take. How can you care for yourself at home? · Ask your family, friends, and coworkers for support. You have a better chance of quitting if you have help and support. · Join a support group, such as Nicotine Anonymous, for people who are trying to quit smoking.  
· Consider signing up for a smoking cessation program, such as the American [employer] Association's Freedom from Smoking program. 
 · Set a quit date. Pick your date carefully so that it is not right in the middle of a big deadline or stressful time. Once you quit, do not even take a puff. Get rid of all ashtrays and lighters after your last cigarette. Clean your house and your clothes so that they do not smell of smoke. · Learn how to be a nonsmoker. Think about ways you can avoid those things that make you reach for a cigarette. ¨ Avoid situations that put you at greatest risk for smoking. For some people, it is hard to have a drink with friends without smoking. For others, they might skip a coffee break with coworkers who smoke. ¨ Change your daily routine. Take a different route to work or eat a meal in a different place. · Cut down on stress. Calm yourself or release tension by doing an activity you enjoy, such as reading a book, taking a hot bath, or gardening. · Talk to your doctor or pharmacist about nicotine replacement therapy, which replaces the nicotine in your body. You still get nicotine but you do not use tobacco. Nicotine replacement products help you slowly reduce the amount of nicotine you need. These products come in several forms, many of them available over-the-counter: ¨ Nicotine patches ¨ Nicotine gum and lozenges ¨ Nicotine inhaler · Ask your doctor about bupropion (Wellbutrin) or varenicline (Chantix), which are prescription medicines. They do not contain nicotine. They help you by reducing withdrawal symptoms, such as stress and anxiety. · Some people find hypnosis, acupuncture, and massage helpful for ending the smoking habit. · Eat a healthy diet and get regular exercise. Having healthy habits will help your body move past its craving for nicotine. · Be prepared to keep trying. Most people are not successful the first few times they try to quit. Do not get mad at yourself if you smoke again.  Make a list of things you learned and think about when you want to try again, such as next week, next month, or next year. Where can you learn more? Go to http://karan-kecia.info/. Enter P230 in the search box to learn more about \"Stopping Smoking: Care Instructions. \" Current as of: May 26, 2016 Content Version: 11.2 © 1943-2982 [employer]. Care instructions adapted under license by [employer] (which disclaims liability or warranty for this information). If you have questions about a medical condition or this instruction, always ask your healthcare professional. [place] [address] any warranty or liability for your use of this information. Tapentadol (By mouth) Tapentadol (ta-PEN-ta-dol) Treats severe pain. This medicine is a narcotic pain reliever. Brand Name(s):Nucynta, Nucynta ER There may be other brand names for this medicine. When This Medicine Should Not Be Used: This medicine is not right for everyone. Do not use it if you had an allergic reaction to tapentadol, or if you have severe breathing problems or paralytic ileus. How to Use This Medicine:  
Liquid, Tablet, Long Acting Tablet · Take your medicine as directed. Your dose may need to be changed several times to find what works best for you. An overdose can be dangerous. Follow directions carefully so you do not get too much medicine at one time · Measure the oral liquid medicine with a marked measuring spoon, oral syringe, or medicine cup. · Swallow the extended-release tablet whole. Do not crush, break, or chew it. Take the tablet with enough water to swallow it completely. If you have been told to use more than one tablet, take them one at a time. · This medicine should come with a Medication Guide. Ask your pharmacist for a copy if you do not have one. · Missed dose: Take a dose as soon as you remember. If it is almost time for your next dose, wait until then and take a regular dose.  Do not take extra medicine to make up for a missed dose. · Store the medicine in a closed container at room temperature, away from heat, moisture, and direct light. Store the medicine in a safe and secure place. Do not throw unused medicine in the trash. Ask your pharmacist about the best way to dispose of medicine you do not use. Keep the oral liquid bottle upright after you open it. Drugs and Foods to Avoid: Ask your doctor or pharmacist before using any other medicine, including over-the-counter medicines, vitamins, and herbal products. · Some foods and medicines can affect how tapentadol works. Tell your doctor if you are using any of the following: ¨ Tramadol ¨ A phenothiazine medicine ¨ Medicine to treat depression (such as mirtazapine, trazodone) ¨ Medicine to treat migraine headaches ¨ An MAO inhibitor (MAOI) within the past 14 days · Do not drink alcohol while you are using this medicine. · Tell your doctor if you use anything else that makes you sleepy. Some examples are allergy medicine, narcotic pain medicine, and alcohol. Tell your doctor if you are also using buprenorphine, butorphanol, nalbuphine, pentazocine, or a muscle relaxer. Warnings While Using This Medicine: · Tell your doctor if you are pregnant or breastfeeding, or if you have kidney disease, liver disease, breathing or lung problems (such as COPD or asthma), low blood pressure, gallbladder problems, pancreas problems, stomach or bowel problems, or trouble urinating. Tell your doctor if you have a history of head injury, brain tumor, seizures, depression, or alcohol or drug abuse. · This medicine may cause the following problems: 
¨ High risk of overdose, which can lead to death ¨ Respiratory depression (serious breathing problem that can be life-threatening) · This medicine can be habit-forming. Do not use more than your prescribed dose. Call your doctor if you think your medicine is not working. · This medicine may make you dizzy, drowsy, or faint. Do not drive or do anything else that could be dangerous until you know how this medicine affects you. Sit or lie down if you feel dizzy. Stand up carefully. · Do not stop using this medicine suddenly. Your doctor will need to slowly decrease your dose before you stop it completely. · This medicine may cause constipation, especially with long-term use. Ask your doctor if you should use a laxative to prevent and treat constipation. · Keep all medicine out of the reach of children. Never share your medicine with anyone. Possible Side Effects While Using This Medicine:  
Call your doctor right away if you notice any of these side effects: · Allergic reaction: Itching or hives, swelling in your face or hands, swelling or tingling in your mouth or throat, chest tightness, trouble breathing · Anxiety, restlessness, muscle spasms, twitching, diarrhea, seeing or hearing things that are not there · Blue lips, fingernails, or skin · Extreme dizziness or weakness, shallow breathing, slow or uneven heartbeat, sweating, seizures, and cold, clammy skin · Severe confusion, lightheadedness, dizziness, fainting · Severe constipation, stomach pain, or vomiting · Trouble breathing or slow breathing If you notice these less serious side effects, talk with your doctor: · Mild constipation, diarrhea, nausea, or vomiting · Mild sleepiness or tiredness If you notice other side effects that you think are caused by this medicine, tell your doctor. Call your doctor for medical advice about side effects. You may report side effects to FDA at 5-251-FDA-7369 © 2016 [address] is for End User's use only and may not be sold, redistributed or otherwise used for commercial purposes. The above information is an  only. It is not intended as medical advice for individual conditions or treatments.  Talk to your doctor, nurse or pharmacist before following any medical regimen to see if it is safe and effective for you. Metoprolol (By mouth) Metoprolol (met-oh-PROE-lol) Treats high blood pressure, angina (chest pain), and heart failure. May lower the risk of death after a heart attack. This medicine is a beta-blocker. Brand Name(s):Lopressor, Toprol XL There may be other brand names for this medicine. When This Medicine Should Not Be Used: This medicine is not right for everyone. Do not use if you had an allergic reaction to metoprolol or similar medicines. Do not use this medicine if you have certain blood circulation or heart problems. Ask your doctor about these problems. How to Use This Medicine:  
Tablet, Long Acting Tablet · Take your medicine as directed. Your dose may need to be changed several times to find what works best for you. · Take this medicine with a meal or right after a meal. Take this medicine the same way every day, at the same time. · Swallow the tablet whole with a glass of water. You may break the extended-release tablet in half, but do not chew or crush it. · Missed dose: Take a dose as soon as you remember. If it is almost time for your next dose, wait until then and take a regular dose. Do not take extra medicine to make up for a missed dose. · Store the medicine in a closed container at room temperature, away from heat, moisture, and direct light. Drugs and Foods to Avoid: Ask your doctor or pharmacist before using any other medicine, including over-the-counter medicines, vitamins, and herbal products. · Some medicines can affect how metoprolol works. Tell your doctor if you are taking any of the following: ¨ Digoxin, dipyridamole, hydralazine, hydroxychloroquine, methyldopa, quinidine ¨ Medicine to treat depression (such as bupropion, clomipramine, desipramine, fluoxetine, fluvoxamine, paroxetine, sertraline), medicine to treat mental illness (such as chlorpromazine, fluphenazine, haloperidol, thioridazine), medicine for heart rhythm problems (such as propafenone), HIV/AIDS medicine (such as ritonavir), medicine to treat a fungus infection (such as terbinafine), a monoamine oxidase inhibitor (MAOI), an ergot medicine for headaches, a calcium channel blocker (such as amlodipine, diltiazem, verapamil), or an alpha blocker (such as clonidine, prazosin, reserpine, guanethidine) Warnings While Using This Medicine: · Tell your doctor if you are pregnant or breastfeeding, or if you have blood vessel, heart, or circulation problems (such as heart failure, rhythm problems, or slow heartbeat). Tell your doctor if you have kidney disease, liver disease, diabetes, lung disease (such as asthma), an overactive thyroid, or a history of allergies. · This medicine may cause worse symptoms of heart failure while the dose is being adjusted. · Do not stop using this medicine suddenly. Your doctor will need to slowly decrease your dose before you stop it completely. · Tell any doctor or dentist who treats you that you are using this medicine. You may need to stop using this medicine several days before you have surgery or medical tests. · This medicine could lower your blood pressure too much, especially when you first use it or if you are dehydrated. Stand or sit up slowly if you feel lightheaded or dizzy. · This medicine may make you dizzy or drowsy. Do not drive or do anything else that could be dangerous until you know how this medicine affects you. · Your doctor will check your progress and the effects of this medicine at regular visits. Keep all appointments. · Keep all medicine out of the reach of children. Never share your medicine with anyone. Possible Side Effects While Using This Medicine:  
Call your doctor right away if you notice any of these side effects: · Allergic reaction: Itching or hives, swelling in your face or hands, swelling or tingling in your mouth or throat, chest tightness, trouble breathing · Lightheadedness, dizziness, or fainting · Slow heartbeat · Swelling in your hands, ankles, or feet, trouble breathing, tiredness · Worsening chest pain If you notice these less serious side effects, talk with your doctor: · Diarrhea · Mild dizziness or tiredness If you notice other side effects that you think are caused by this medicine, tell your doctor. Call your doctor for medical advice about side effects. You may report side effects to FDA at 3-157-ICC-0111 © 2016 [address] is for End User's use only and may not be sold, redistributed or otherwise used for commercial purposes. The above information is an  only. It is not intended as medical advice for individual conditions or treatments. Talk to your doctor, nurse or pharmacist before following any medical regimen to see if it is safe and effective for you. Ibuprofen (By mouth) Ibuprofen (eye-bue-PROE-fen) Treats pain and fever. This medicine is an NSAID. Brand Name(s): Advil, Advil Children's, Advil Liqui-Gels, Advil Migraine, All-Purpose First Aid Kit, Children's Ibuprofen, Children's Motrin, Comfort Pac, Concentrated Motrin Infants' Drops, Genpril, [facility] Cap-Profen, [facility] Ibuprofen Infants', [facility] Children's Ibuprofen, [facility] Ibuprofen, [facility] Ibuprofen Doc Strength There may be other brand names for this medicine. When This Medicine Should Not Be Used: This medicine is not right for everyone. Do not use if you had an allergic reaction (including asthma) to ibuprofen, aspirin, or another NSAID, or right before or after heart surgery. How to Use This Medicine:  
Capsule, Liquid Filled Capsule, Suspension, Tablet, Chewable Tablet · Your doctor will tell you how much medicine to use. Do not use more than directed. · Prescription ibuprofen should come with a Medication Guide. Ask your pharmacist for the Medication Guide if you do not have one. · Follow the instructions on the medicine label if you are using this medicine without a prescription. · Take this medicine with food or milk if it upsets your stomach. · Oral liquid: Shake well just before using. Measure with a marked measuring spoon, oral syringe, or medicine cup. · Chewable tablet: Chew completely before you swallow it. Then drink some water to make sure you swallow all of the medicine. · For Children: Ask your pharmacist if you are not sure how much medicine to give a child. The dose is usually based on weight, not age. Never give more medicine than directed. · For Adults: Do not take more than 6 pills in 1 day (24 hours) unless your doctor tells you to. · Missed dose: If you take this medicine on a regular basis and miss a dose, take it as soon as you can. If it is almost time for your next dose, wait until then to use the medicine and skip the missed dose. Do not use extra medicine to make up for a missed dose. · Store the medicine in a closed container at room temperature, away from heat, moisture, and direct light. Do not freeze the oral liquid. Drugs and Foods to Avoid: Ask your doctor or pharmacist before using any other medicine, including over-the-counter medicines, vitamins, and herbal products. · Some foods and medicine can affect how ibuprofen works. Tell your doctor if you are also using lithium, methotrexate, a blood thinner (such as warfarin), a steroid medicine (such as hydrocortisone, prednisolone, prednisone), a diuretic (water pill), or an ACE inhibitor blood pressure medicine. · Do not use any other NSAID medicine unless your doctor says it is okay. Some other NSAIDs are aspirin, diclofenac, naproxen, or celecoxib. · Do not drink alcohol while you are using this medicine. Warnings While Using This Medicine: · Tell your doctor if you are pregnant or breastfeeding. Do not use this medicine during the later part of pregnancy. · Tell your doctor if you have kidney disease, liver disease, asthma, lupus or a similar connective tissue disease, or a history of ulcers or other digestion problems. Tell your doctor if you smoke or have heart or blood circulation problems, including high blood pressure, heart failure (CHF), or bleeding problems. · This medicine may cause the following problems: ¨ Bleeding and ulcers in the stomach or intestines ¨ Higher risk of heart attack or stroke ¨ Liver damage ¨ Kidney damage ¨ Vision problems · Call your doctor if symptoms get worse, pain lasts more than 10 days, or fever lasts more than 3 days. · This medicine might contain sugar or phenylalanine (aspartame). · Tell any doctor or dentist who treats you that you are using this medicine. · Keep all medicine out of the reach of children. Never share your medicine with anyone. Possible Side Effects While Using This Medicine:  
Call your doctor right away if you notice any of these side effects: · Allergic reaction: Itching or hives, swelling in your face or hands, swelling or tingling in your mouth or throat, chest tightness, trouble breathing · Blistering, peeling, or red skin rash · Change in how much or how often you urinate · Chest pain that may spread to your arms, jaw, back, or neck, trouble breathing, nausea, unusual sweating, faintness · Chest pain, trouble breathing, weakness on one side of your body, severe headache, trouble seeing or talking, pain in your lower leg · Dark urine or pale stools, nausea, vomiting, loss of appetite, stomach pain, yellow skin or eyes · Fever, neck pain, stiff neck · Severe stomach pain, vomiting blood, bloody or black, tarry stools · Swelling in your hands, ankles, or feet, rapid weight gain · Trouble seeing, blind spots, change in how you see colors · Unusual bleeding, bruising, or weakness If you notice these less serious side effects, talk with your doctor: · Constipation, diarrhea, gas, mild upset stomach · Dizziness, headache, ringing in the ears If you notice other side effects that you think are caused by this medicine, tell your doctor. Call your doctor for medical advice about side effects. You may report side effects to FDA at 4-992-VOD-0067 © 2016 [address] is for End User's use only and may not be sold, redistributed or otherwise used for commercial purposes. The above information is an  only. It is not intended as medical advice for individual conditions or treatments. Talk to your doctor, nurse or pharmacist before following any medical regimen to see if it is safe and effective for you.

## 2017-04-21 NOTE — OP NOTES
[facility]   OPERATIVE REPORT       Name:  Marifer Dickinson   MR#:  [id number]   :  1965   Account #:  [de-identified]   Date of Adm:  2017       DATE OF SURGERY: 2017    PREOPERATIVE DIAGNOSIS: Severe mitral regurgitation. POSTOPERATIVE DIAGNOSIS: Severe mitral regurgitation. NAME OF PROCEDURE:   1. Minimally invasive mitral valve repair with P2 resection and   32 mm Physio II annuloplasty ring. 2. Cryo intercostal nerve block for 5 segments. SURGEON: Pantera Loaiza. Alda Clifford MD      ASSISTANT: Gracia Brownlee. Armando Sanabria MD      ANESTHESIA: General endotracheal with MARCUS and dual lumen   endotracheal tube. OPERATIVE FINDINGS: The patient had severe MR confirmed by   transesophageal echo. He did have a ruptured chord with flail P2   segment with a mildly dilated annulus. INDICATIONS: Mr. Henry Gay is a very pleasant 74-year-old gentleman   who has had a several year history of worsening exertional   dyspnea. It became so profound that he presented to the   emergency room. An echo was performed which showed severe mitral   regurgitation with flail P2 segment. Transesophageal echo   confirmed this. Left heart catheterization showed clean   coronaries. DESCRIPTION: After informed consent was obtained from the   patient, he was brought to the operative suite, placed in supine   position. General anesthesia instituted without difficulty. Appropriate monitoring lines were placed by Anesthesia. Transesophageal echo was then performed by Dr. Cristo Ortez   confirming MR. He was then positioned for a minimally invasive   approach, prepped and draped in the usual sterile fashion. This   was a very large man with a BSA of 2.3. Because of this,   incision was generally larger than normal. An approximately 10 cm   incision was then made in the inframammary line. Dissection was   carried down to the chest wall. The fourth intercostal space was   identified.  The right lung was deflated and the right space was   then entered without difficulty. The retractor was then placed,   giving good visualization of the right side of the heart. The   dome of the right diaphragm was in the way and using   a retraction suture, this was pulled out of the way at this   point, using the [employer] cryoablation system intercostal nerve   blocks were then performed on the space, entered and 2 above and   2 below. Next, the pericardium was opened and retraction sutures   were placed, giving excellent visualization of the right side of   the heart. At this point, we felt like the case was feasible   through this approach. Our attention was next taken to the right   groin. A 3 cm incision was then made over the femoral vessels. Dissection was carried down to them. They was easily identified. Pursestrings were then placed into the femoral artery and   femoral vein. Systemic heparinization was then given. When   adequate ACT was obtained, he was cannulated through these the   groin vessels without difficulty and connected to   cardiopulmonary bypass. Because of his large size, we added an   extra venous drainage line to his superior vena cava. The   pursestring was then placed in it and a vent cannula was then   placed and connected to cardiopulmonary bypass. This was then   instituted without difficulty and good decompression of the   heart. The Seamus clamp was brought through the chest wall. A   cardioplegia cannula was placed into the aorta. At this point,   the aorta was then cross-clamped. He received antegrade cold   blood cardioplegia solution. He did receive intermittent doses   throughout the remainder of the case. Waterston's groove was   dissected, entered without difficulty, extended. The [place]   retractor was then placed through the chest wall, giving   excellent visualization of the mitral valve.  It was thoroughly   explored with iced saline and noted to have a ruptured chord   with a flail P2 segment. Marking sutures were placed on either   side and this was resected. Then using a 5-0 Ethibond, the valve   edges were then reapproximated. It measured to a 32 mm Physio   II. Sutures were then placed around the anulus. The ring was   brought up in the field, sutures passed through the sewing ring   and the ring seated without difficulty and tied into place. The   valve was then tested with cold saline solution and noted to be   completely competent. At this point, the retractor was removed. The left atriotomy was closed in double layer fashion with   pledgeted 4-0 Prolene. He was warmed to 37 degrees centigrade,   given a hot shot dose of cardioplegia. Meticulous de-airing   maneuvers were then undertaken and noted to be adequate by   transesophageal echo. Cross-clamp was removed. He resumed kind of   a slow junctional rhythm. Ventricular pacing wires were placed   on the heart, brought out through separate stab inferiorly and   affixed to the skin. He was VVI paced the remainder of the case. He was then weaned from cardiopulmonary bypass without   difficulty, decannulated. All pursestrings were tied and noted to   be hemostatic. Protamine sulfate was given. Meticulous   hemostasis was achieved. Two pleural tubes were then placed, one   32 straight posteriorly and a 19 Jake anteriorly placed into   the pericardium. Pericostal sutures of #1 Ethibond were placed   and the chest incision was closed in 3-layer fashion with 2-0   and 4-0 Vicryl. The groin incision was closed in 3-layer fashion   with 3-0 and 4-0 Vicryl. Again, the repair was checked by   transesophageal echo and noted to be completely competent. No   mitral regurgitation at all. At the end of the case, anesthesia   traded out his dual lumen tube for a single lumen tube and he   was subsequently transferred to [facility] ICU in stable condition. COUNTS: All instrument and sponge counts were correct at the end   of the case.         MAGDA TORRES Donnis Simmonds, MD [facility] / [facility]   D:  04/21/2017   14:19   T:  04/21/2017   14:58   Job #:  [id number]

## 2017-04-21 NOTE — PROGRESS NOTES
TRANSFER - IN REPORT:    Verbal report received from Amrita Malave CRNA(name) on Shanon Pabon  being received from OR(unit) for routine post - op      Report consisted of patients Situation, Background, Assessment and   Recommendations(SBAR). Information from the following report(s) SBAR, OR Summary, Procedure Summary, Intake/Output and MAR was reviewed with the receiving nurse. Opportunity for questions and clarification was provided. Assessment completed upon patients arrival to unit and care assumed.

## 2017-04-21 NOTE — PROGRESS NOTES
RT at bedside. Pt able to meet requirements for extubation (physical mechanics and NIF). Procedure explained to Pt. Pt nodded understanding. VSS. Pt extubated at this time. Mouth and throat suctioned as Pt coughed to removed all secretions. BBS , no stridor noted. Pt able to verbalize name and cough effectively. Pt tolerated procedure well, no acute distress noted. Pt placed on Airvo 40 Liters/minute and 40 % FiO2. Oxygen saturations 97%. Will continue to monitor.

## 2017-04-21 NOTE — ANESTHESIA PROCEDURE NOTES
Right internal jugular MAC introducer and PAC    Start time: 4/21/2017 8:20 AM  End time: 4/21/2017 8:28 AM  Performed by: Jerome Quiroz  Authorized by: Jerome Quiroz     Indications: vascular access, central pressure monitoring and need for vasopressors  Preanesthetic Checklist: patient identified, risks and benefits discussed, anesthesia consent, site marked, patient being monitored and timeout performed      Pre-procedure: All elements of maximal sterile barrier technique followed? Yes    Maximal barrier precautions followed, 2% Chlorhexidine for cutaneous antisepsis, Hand hygiene performed prior to catheter insertion and Ultrasound guidance    Sterile Ultrasound Technique followed?: Yes          Procedure:   Prep:  Chlorhexidine  Location:  Internal jugular  Orientation:  Right  Patient position:  Trendelenburg  Catheter type:  Double lumen  Catheter size:  9 Fr  Catheter length:  12 cm  Number of attempts:  1  Successful placement: Yes      Assessment:   Post-procedure:  Catheter secured and sterile dressing applied  Assessment:  Blood return through all ports and free fluid flow  Insertion:  Uncomplicated  Patient tolerance:  Patient tolerated the procedure well with no immediate complications  Right neck scanned for patency and location of his right internal jugular vein, and then it was cannulated easily with an 18g angiocath on the first pass, Efren test fell, and then a wire was placed and the wire was visualized within the correct vessel. The MAC introducer was then placed using seldinger technique and sutured in place. The PAC was inserted with minimal ectopy and wedged at approximately 58cm. I withdrew the catheter 5 cm prior to securing it to the sterile PAC cover.

## 2017-04-21 NOTE — ANESTHESIA PROCEDURE NOTES
MARCUS for monitoriong  Date/Time: 4/21/2017 8:45 AM  Ordering Provider: Samson Bruce    Procedure Details: probe placement, image aquisition & interpretation    Risks and benefits discussed with the patient and plans are to proceed    Procedure Note    Performed by: Freddy Cancer by: Jose CLARK       Indications: assessment of surgical repair  Modalities: 2D, CF  Probe Type: biplane  Insertion: atraumatic  Patient Status: intubated and sedated (general anesthesia)    Echocardiographic and Doppler Measurements   Aorta  Size  Diam(cm)  Dissection PlaqueThick(mm)  Plaque Mobile    Ascending normal  No      Arch normal  No      Descending normal  No            Valves  Annulus  Stenosis  Area/Grad  Regurg  Leaflet   Morph  Leaflet   Motion    Aortic normal none  1+ normal normal    Mitral    3+ thickened prolapse, flail, Post    Tricuspid               Post Intervention Follow-up Study  Ventricular Global Function: unchanged  Ventricular Regional Function: unchanged     Valve  Function  Regurgitation  Area    Aortic       Mitral improved 0     Tricuspid       Prosthetic        Complications: None  Comments: Mitral valve with P2 prolapse and eccentric anteriorly directed MR, trace central AI jet, nl LVEF and normal wall motion  MARCUS used to position the venous cannula approximately 2cm into the SVC prior to initiation of CPB    POST PUMP  No intracardiac air prior to weaning from CPB  No MR, no VAMSHI, aorta without dissection  Normal LVEF

## 2017-04-22 ENCOUNTER — APPOINTMENT (OUTPATIENT)
Dept: GENERAL RADIOLOGY | Age: 52
DRG: 221 | End: 2017-04-22
Attending: THORACIC SURGERY (CARDIOTHORACIC VASCULAR SURGERY)
Payer: COMMERCIAL

## 2017-04-22 PROBLEM — R09.02 HYPOXEMIA: Status: ACTIVE | Noted: 2017-04-22

## 2017-04-22 PROBLEM — Z99.11 ENCOUNTER FOR WEANING FROM VENTILATOR (HCC): Status: RESOLVED | Noted: 2017-04-21 | Resolved: 2017-04-22

## 2017-04-22 LAB
ANION GAP BLD CALC-SCNC: 10 MMOL/L (ref 7–16)
ATRIAL RATE: 73 BPM
ATRIAL RATE: 82 BPM
BUN SERPL-MCNC: 13 MG/DL (ref 6–23)
CALCIUM SERPL-MCNC: 7.6 MG/DL (ref 8.3–10.4)
CALCULATED P AXIS, ECG09: 67 DEGREES
CALCULATED P AXIS, ECG09: 74 DEGREES
CALCULATED R AXIS, ECG10: 46 DEGREES
CALCULATED R AXIS, ECG10: 56 DEGREES
CALCULATED T AXIS, ECG11: 48 DEGREES
CALCULATED T AXIS, ECG11: 53 DEGREES
CHLORIDE SERPL-SCNC: 112 MMOL/L (ref 98–107)
CO2 SERPL-SCNC: 24 MMOL/L (ref 21–32)
CREAT SERPL-MCNC: 1.36 MG/DL (ref 0.8–1.5)
DIAGNOSIS, 93000: NORMAL
DIAGNOSIS, 93000: NORMAL
ERYTHROCYTE [DISTWIDTH] IN BLOOD BY AUTOMATED COUNT: 12.6 % (ref 11.9–14.6)
GLUCOSE BLD STRIP.AUTO-MCNC: 112 MG/DL (ref 65–100)
GLUCOSE BLD STRIP.AUTO-MCNC: 115 MG/DL (ref 65–100)
GLUCOSE BLD STRIP.AUTO-MCNC: 116 MG/DL (ref 65–100)
GLUCOSE BLD STRIP.AUTO-MCNC: 117 MG/DL (ref 65–100)
GLUCOSE BLD STRIP.AUTO-MCNC: 123 MG/DL (ref 65–100)
GLUCOSE BLD STRIP.AUTO-MCNC: 128 MG/DL (ref 65–100)
GLUCOSE BLD STRIP.AUTO-MCNC: 129 MG/DL (ref 65–100)
GLUCOSE BLD STRIP.AUTO-MCNC: 135 MG/DL (ref 65–100)
GLUCOSE BLD STRIP.AUTO-MCNC: 140 MG/DL (ref 65–100)
GLUCOSE BLD STRIP.AUTO-MCNC: 141 MG/DL (ref 65–100)
GLUCOSE SERPL-MCNC: 141 MG/DL (ref 65–100)
HCT VFR BLD AUTO: 36.7 % (ref 41.1–50.3)
HGB BLD-MCNC: 13.1 G/DL (ref 13.6–17.2)
MAGNESIUM SERPL-MCNC: 2.1 MG/DL (ref 1.8–2.4)
MCH RBC QN AUTO: 31 PG (ref 26.1–32.9)
MCHC RBC AUTO-ENTMCNC: 35.7 G/DL (ref 31.4–35)
MCV RBC AUTO: 87 FL (ref 79.6–97.8)
MM INDURATION POC: 0 MM (ref 0–5)
P-R INTERVAL, ECG05: 142 MS
P-R INTERVAL, ECG05: 162 MS
PLATELET # BLD AUTO: 143 K/UL (ref 150–450)
PMV BLD AUTO: 11.9 FL (ref 10.8–14.1)
POTASSIUM SERPL-SCNC: 4.5 MMOL/L (ref 3.5–5.1)
PPD POC: NEGATIVE NEGATIVE
Q-T INTERVAL, ECG07: 410 MS
Q-T INTERVAL, ECG07: 426 MS
QRS DURATION, ECG06: 92 MS
QRS DURATION, ECG06: 94 MS
QTC CALCULATION (BEZET), ECG08: 469 MS
QTC CALCULATION (BEZET), ECG08: 479 MS
RBC # BLD AUTO: 4.22 M/UL (ref 4.23–5.67)
SODIUM SERPL-SCNC: 146 MMOL/L (ref 136–145)
VENTRICULAR RATE, ECG03: 73 BPM
VENTRICULAR RATE, ECG03: 82 BPM
WBC # BLD AUTO: 11.7 K/UL (ref 4.3–11.1)

## 2017-04-22 PROCEDURE — 77010033678 HC OXYGEN DAILY

## 2017-04-22 PROCEDURE — 71010 XR CHEST SNGL V: CPT

## 2017-04-22 PROCEDURE — 80048 BASIC METABOLIC PNL TOTAL CA: CPT | Performed by: THORACIC SURGERY (CARDIOTHORACIC VASCULAR SURGERY)

## 2017-04-22 PROCEDURE — 74011250637 HC RX REV CODE- 250/637: Performed by: THORACIC SURGERY (CARDIOTHORACIC VASCULAR SURGERY)

## 2017-04-22 PROCEDURE — 36600 WITHDRAWAL OF ARTERIAL BLOOD: CPT

## 2017-04-22 PROCEDURE — 83735 ASSAY OF MAGNESIUM: CPT | Performed by: THORACIC SURGERY (CARDIOTHORACIC VASCULAR SURGERY)

## 2017-04-22 PROCEDURE — 97162 PT EVAL MOD COMPLEX 30 MIN: CPT

## 2017-04-22 PROCEDURE — 65660000004 HC RM CVT STEPDOWN

## 2017-04-22 PROCEDURE — 99232 SBSQ HOSP IP/OBS MODERATE 35: CPT | Performed by: INTERNAL MEDICINE

## 2017-04-22 PROCEDURE — 93005 ELECTROCARDIOGRAM TRACING: CPT | Performed by: THORACIC SURGERY (CARDIOTHORACIC VASCULAR SURGERY)

## 2017-04-22 PROCEDURE — L3670 SO ACRO/CLAV CAN WEB PRE OTS: HCPCS

## 2017-04-22 PROCEDURE — 74011000250 HC RX REV CODE- 250: Performed by: THORACIC SURGERY (CARDIOTHORACIC VASCULAR SURGERY)

## 2017-04-22 PROCEDURE — 82962 GLUCOSE BLOOD TEST: CPT

## 2017-04-22 PROCEDURE — 74011250637 HC RX REV CODE- 250/637: Performed by: NURSE PRACTITIONER

## 2017-04-22 PROCEDURE — 85027 COMPLETE CBC AUTOMATED: CPT | Performed by: THORACIC SURGERY (CARDIOTHORACIC VASCULAR SURGERY)

## 2017-04-22 PROCEDURE — 74011250636 HC RX REV CODE- 250/636: Performed by: THORACIC SURGERY (CARDIOTHORACIC VASCULAR SURGERY)

## 2017-04-22 RX ORDER — FUROSEMIDE 20 MG/1
20 TABLET ORAL DAILY
Status: COMPLETED | OUTPATIENT
Start: 2017-04-23 | End: 2017-04-24

## 2017-04-22 RX ORDER — ACETAMINOPHEN 325 MG/1
650 TABLET ORAL
Status: DISCONTINUED | OUTPATIENT
Start: 2017-04-22 | End: 2017-04-25 | Stop reason: HOSPADM

## 2017-04-22 RX ORDER — MAG HYDROX/ALUMINUM HYD/SIMETH 200-200-20
30 SUSPENSION, ORAL (FINAL DOSE FORM) ORAL
Status: DISCONTINUED | OUTPATIENT
Start: 2017-04-22 | End: 2017-04-25 | Stop reason: HOSPADM

## 2017-04-22 RX ORDER — ACYCLOVIR 200 MG/1
200 CAPSULE ORAL DAILY
Status: DISCONTINUED | OUTPATIENT
Start: 2017-04-22 | End: 2017-04-25 | Stop reason: HOSPADM

## 2017-04-22 RX ORDER — LANOLIN ALCOHOL/MO/W.PET/CERES
400 CREAM (GRAM) TOPICAL
Status: DISCONTINUED | OUTPATIENT
Start: 2017-04-22 | End: 2017-04-25 | Stop reason: HOSPADM

## 2017-04-22 RX ORDER — FUROSEMIDE 10 MG/ML
40 INJECTION INTRAMUSCULAR; INTRAVENOUS ONCE
Status: COMPLETED | OUTPATIENT
Start: 2017-04-22 | End: 2017-04-22

## 2017-04-22 RX ORDER — ZOLPIDEM TARTRATE 5 MG/1
5 TABLET ORAL
Status: DISCONTINUED | OUTPATIENT
Start: 2017-04-22 | End: 2017-04-25 | Stop reason: HOSPADM

## 2017-04-22 RX ORDER — POTASSIUM CHLORIDE 20 MEQ/1
40 TABLET, EXTENDED RELEASE ORAL
Status: DISCONTINUED | OUTPATIENT
Start: 2017-04-22 | End: 2017-04-25 | Stop reason: HOSPADM

## 2017-04-22 RX ORDER — ASPIRIN 81 MG/1
81 TABLET ORAL DAILY
Status: DISCONTINUED | OUTPATIENT
Start: 2017-04-23 | End: 2017-04-25 | Stop reason: HOSPADM

## 2017-04-22 RX ORDER — METOPROLOL TARTRATE 25 MG/1
25 TABLET, FILM COATED ORAL EVERY 12 HOURS
Status: DISCONTINUED | OUTPATIENT
Start: 2017-04-22 | End: 2017-04-22 | Stop reason: SDUPTHER

## 2017-04-22 RX ORDER — POTASSIUM CHLORIDE 20 MEQ/1
20 TABLET, EXTENDED RELEASE ORAL
Status: DISCONTINUED | OUTPATIENT
Start: 2017-04-22 | End: 2017-04-25 | Stop reason: HOSPADM

## 2017-04-22 RX ORDER — SCOLOPAMINE TRANSDERMAL SYSTEM 1 MG/1
1.5 PATCH, EXTENDED RELEASE TRANSDERMAL
Status: DISCONTINUED | OUTPATIENT
Start: 2017-04-22 | End: 2017-04-24

## 2017-04-22 RX ORDER — SODIUM CHLORIDE 0.9 % (FLUSH) 0.9 %
5-10 SYRINGE (ML) INJECTION AS NEEDED
Status: DISCONTINUED | OUTPATIENT
Start: 2017-04-22 | End: 2017-04-25 | Stop reason: HOSPADM

## 2017-04-22 RX ORDER — AMIODARONE HYDROCHLORIDE 200 MG/1
200 TABLET ORAL EVERY 12 HOURS
Status: DISCONTINUED | OUTPATIENT
Start: 2017-04-22 | End: 2017-04-22 | Stop reason: SDUPTHER

## 2017-04-22 RX ORDER — MUPIROCIN 20 MG/G
OINTMENT TOPICAL 2 TIMES DAILY
Status: DISCONTINUED | OUTPATIENT
Start: 2017-04-22 | End: 2017-04-24

## 2017-04-22 RX ORDER — FAMOTIDINE 20 MG/1
20 TABLET, FILM COATED ORAL EVERY 12 HOURS
Status: DISCONTINUED | OUTPATIENT
Start: 2017-04-22 | End: 2017-04-25 | Stop reason: HOSPADM

## 2017-04-22 RX ORDER — FAMOTIDINE 20 MG/1
20 TABLET, FILM COATED ORAL 2 TIMES DAILY
Status: DISCONTINUED | OUTPATIENT
Start: 2017-04-22 | End: 2017-04-22

## 2017-04-22 RX ORDER — POTASSIUM CHLORIDE 750 MG/1
10 TABLET, EXTENDED RELEASE ORAL DAILY
Status: COMPLETED | OUTPATIENT
Start: 2017-04-22 | End: 2017-04-24

## 2017-04-22 RX ORDER — SODIUM CHLORIDE 0.9 % (FLUSH) 0.9 %
5-10 SYRINGE (ML) INJECTION EVERY 8 HOURS
Status: DISCONTINUED | OUTPATIENT
Start: 2017-04-22 | End: 2017-04-25 | Stop reason: HOSPADM

## 2017-04-22 RX ORDER — ONDANSETRON 2 MG/ML
4 INJECTION INTRAMUSCULAR; INTRAVENOUS
Status: DISCONTINUED | OUTPATIENT
Start: 2017-04-22 | End: 2017-04-25 | Stop reason: HOSPADM

## 2017-04-22 RX ADMIN — OXYCODONE HYDROCHLORIDE AND ACETAMINOPHEN 1 TABLET: 10; 325 TABLET ORAL at 20:18

## 2017-04-22 RX ADMIN — ONDANSETRON 4 MG: 2 INJECTION INTRAMUSCULAR; INTRAVENOUS at 16:29

## 2017-04-22 RX ADMIN — ONDANSETRON 4 MG: 2 INJECTION INTRAMUSCULAR; INTRAVENOUS at 02:42

## 2017-04-22 RX ADMIN — ONDANSETRON 4 MG: 2 INJECTION INTRAMUSCULAR; INTRAVENOUS at 20:19

## 2017-04-22 RX ADMIN — ASPIRIN 81 MG: 81 TABLET, CHEWABLE ORAL at 09:46

## 2017-04-22 RX ADMIN — PROMETHAZINE HYDROCHLORIDE 12.5 MG: 25 INJECTION, SOLUTION INTRAMUSCULAR; INTRAVENOUS at 09:54

## 2017-04-22 RX ADMIN — AMIODARONE HYDROCHLORIDE 200 MG: 200 TABLET ORAL at 20:15

## 2017-04-22 RX ADMIN — Medication: at 18:00

## 2017-04-22 RX ADMIN — TRAMADOL HYDROCHLORIDE 50 MG: 50 TABLET, FILM COATED ORAL at 14:41

## 2017-04-22 RX ADMIN — OXYCODONE HYDROCHLORIDE AND ACETAMINOPHEN 1 TABLET: 10; 325 TABLET ORAL at 05:12

## 2017-04-22 RX ADMIN — HYDROMORPHONE HYDROCHLORIDE 1 MG: 1 INJECTION, SOLUTION INTRAMUSCULAR; INTRAVENOUS; SUBCUTANEOUS at 03:00

## 2017-04-22 RX ADMIN — FUROSEMIDE 40 MG: 10 INJECTION, SOLUTION INTRAMUSCULAR; INTRAVENOUS at 14:05

## 2017-04-22 RX ADMIN — OXYCODONE HYDROCHLORIDE AND ACETAMINOPHEN 1 TABLET: 10; 325 TABLET ORAL at 09:46

## 2017-04-22 RX ADMIN — FAMOTIDINE 20 MG: 20 TABLET ORAL at 20:16

## 2017-04-22 RX ADMIN — METOPROLOL TARTRATE 25 MG: 25 TABLET ORAL at 09:46

## 2017-04-22 RX ADMIN — POTASSIUM CHLORIDE 10 MEQ: 10 TABLET, EXTENDED RELEASE ORAL at 14:12

## 2017-04-22 RX ADMIN — CEFAZOLIN 2 G: 1 INJECTION, POWDER, FOR SOLUTION INTRAMUSCULAR; INTRAVENOUS; PARENTERAL at 03:44

## 2017-04-22 RX ADMIN — METOPROLOL TARTRATE 25 MG: 25 TABLET ORAL at 20:17

## 2017-04-22 RX ADMIN — ACYCLOVIR 200 MG: 200 CAPSULE ORAL at 14:13

## 2017-04-22 RX ADMIN — MUPIROCIN: 20 OINTMENT TOPICAL at 18:06

## 2017-04-22 RX ADMIN — Medication 10 ML: at 14:07

## 2017-04-22 RX ADMIN — MUPIROCIN: 20 OINTMENT TOPICAL at 10:18

## 2017-04-22 RX ADMIN — ONDANSETRON 4 MG: 2 INJECTION INTRAMUSCULAR; INTRAVENOUS at 12:38

## 2017-04-22 RX ADMIN — OXYCODONE HYDROCHLORIDE AND ACETAMINOPHEN 1 TABLET: 10; 325 TABLET ORAL at 16:35

## 2017-04-22 RX ADMIN — FAMOTIDINE 20 MG: 10 INJECTION, SOLUTION INTRAVENOUS at 09:46

## 2017-04-22 RX ADMIN — OXYCODONE HYDROCHLORIDE AND ACETAMINOPHEN 1 TABLET: 5; 325 TABLET ORAL at 01:40

## 2017-04-22 RX ADMIN — ONDANSETRON 4 MG: 2 INJECTION INTRAMUSCULAR; INTRAVENOUS at 07:52

## 2017-04-22 RX ADMIN — Medication: at 20:17

## 2017-04-22 RX ADMIN — AMIODARONE HYDROCHLORIDE 200 MG: 200 TABLET ORAL at 09:46

## 2017-04-22 RX ADMIN — Medication 10 ML: at 06:04

## 2017-04-22 RX ADMIN — Medication 10 ML: at 20:17

## 2017-04-22 RX ADMIN — OXYCODONE HYDROCHLORIDE AND ACETAMINOPHEN 1 TABLET: 10; 325 TABLET ORAL at 10:02

## 2017-04-22 RX ADMIN — HYDROMORPHONE HYDROCHLORIDE 1 MG: 1 INJECTION, SOLUTION INTRAMUSCULAR; INTRAVENOUS; SUBCUTANEOUS at 07:34

## 2017-04-22 NOTE — PROGRESS NOTES
TRANSFER - IN REPORT:    Verbal report received from Barrett RN(name) on Julio Cardoza  being received from [facility](unit) for routine post - op      Report consisted of patients Situation, Background, Assessment and   Recommendations(SBAR). Information from the following report(s) SBAR, Kardex, Procedure Summary, Intake/Output, MAR and Cardiac Rhythm NSR was reviewed with the receiving nurse. Opportunity for questions and clarification was provided. Assessment completed upon patients arrival to unit and care assumed. Dual nurse skin assessment performed. No areas of breakdown noted. Right chest incision covered with clean, dry dressing.

## 2017-04-22 NOTE — PROGRESS NOTES
Patient having no pain relief with percocet. Dr. Killian Trejo paged. Telephone orders received for 1 mg of dilaudid IV q 4 hours.

## 2017-04-22 NOTE — PROGRESS NOTES
Bedside and Verbal shift change report given to Jerry Dye RN (oncoming nurse) by Eden Mcclendon (offgoing nurse).

## 2017-04-22 NOTE — PROGRESS NOTES
Pt's left radial art line removed at this time. Manual pressure held until hemostasis achieved. No bleeding or hematoma at site. Pressure dressing to site. Will monitor. Pt's [place] removed at this time. Pt given instructions for [place] pull and Pt v/u. [place] removed without difficulty, no ectopy seen on monitor. Line hub capped. Pt tolerated procedure well. No acute distress noted. VSS throughout. Will continue to monitor.

## 2017-04-22 NOTE — PROGRESS NOTES
CV Progress Note    Admit Date: 4/21/2017    POD 1    Subjective:     Patient present conditions: Awake, Alert and Cooperative. Review of Systems   Cardiac: Vital signs stable. Lines out  Respiratory: Chest x-ray clear. Minimal chest tube drainage. extubated  Neuro: Moves all four extremities. Incision: Dry. GI: Taking liquids. Objective:     Vitals:  Blood pressure 121/76, pulse 79, temperature 97.5 °F (36.4 °C), resp. rate 23, height 5' 10\" (1.778 m), weight 265 lb 6.9 oz (120.4 kg), SpO2 98 %. I/O:  04/22 0701 - 04/22 1900  In: -   Out: 55 [Urine:55]  04/20 1901 - 04/22 0700  In: 1865 [P.O.:50; I.V.:900]  Out: 0658 [Urine:2281; Drains:880]    Heart: No Murmur. Lung: Working with IS. Neuro: Cooperative. Incisions: Dry.     ECG/Telemetry: Unchanged from Pre-Op    Labs:  Recent Results (from the past 12 hour(s))   MAGNESIUM    Collection Time: 04/21/17 10:00 PM   Result Value Ref Range    Magnesium 2.2 1.8 - 2.4 mg/dL   POTASSIUM    Collection Time: 04/21/17 10:00 PM   Result Value Ref Range    Potassium 4.5 3.5 - 5.1 mmol/L   HGB & HCT    Collection Time: 04/21/17 10:00 PM   Result Value Ref Range    HGB 13.9 13.6 - 17.2 g/dL    HCT 38.4 (L) 41.1 - 50.3 %   GLUCOSE, POC    Collection Time: 04/21/17 10:06 PM   Result Value Ref Range    Glucose (POC) 145 (H) 65 - 100 mg/dL   GLUCOSE, POC    Collection Time: 04/21/17 11:11 PM   Result Value Ref Range    Glucose (POC) 141 (H) 65 - 100 mg/dL   GLUCOSE, POC    Collection Time: 04/22/17 12:16 AM   Result Value Ref Range    Glucose (POC) 141 (H) 65 - 100 mg/dL   GLUCOSE, POC    Collection Time: 04/22/17  1:27 AM   Result Value Ref Range    Glucose (POC) 140 (H) 65 - 100 mg/dL   MAGNESIUM    Collection Time: 04/22/17  2:00 AM   Result Value Ref Range    Magnesium 2.1 1.8 - 2.4 mg/dL   METABOLIC PANEL, BASIC    Collection Time: 04/22/17  2:00 AM   Result Value Ref Range    Sodium 146 (H) 136 - 145 mmol/L    Potassium 4.5 3.5 - 5.1 mmol/L    Chloride 112 (H) 98 - 107 mmol/L    CO2 24 21 - 32 mmol/L    Anion gap 10 7 - 16 mmol/L    Glucose 141 (H) 65 - 100 mg/dL    BUN 13 6 - 23 MG/DL    Creatinine 1.36 0.8 - 1.5 MG/DL    GFR est AA >60 >60 ml/min/1.73m2    GFR est non-AA 59 (L) >60 ml/min/1.73m2    Calcium 7.6 (L) 8.3 - 10.4 MG/DL   CBC W/O DIFF    Collection Time: 04/22/17  2:00 AM   Result Value Ref Range    WBC 11.7 (H) 4.3 - 11.1 K/uL    RBC 4.22 (L) 4.23 - 5.67 M/uL    HGB 13.1 (L) 13.6 - 17.2 g/dL    HCT 36.7 (L) 41.1 - 50.3 %    MCV 87.0 79.6 - 97.8 FL    MCH 31.0 26.1 - 32.9 PG    MCHC 35.7 (H) 31.4 - 35.0 g/dL    RDW 12.6 11.9 - 14.6 %    PLATELET 926 (L) 379 - 450 K/uL    MPV 11.9 10.8 - 14.1 FL   GLUCOSE, POC    Collection Time: 04/22/17  3:04 AM   Result Value Ref Range    Glucose (POC) 135 (H) 65 - 100 mg/dL   GLUCOSE, POC    Collection Time: 04/22/17  4:20 AM   Result Value Ref Range    Glucose (POC) 129 (H) 65 - 100 mg/dL   GLUCOSE, POC    Collection Time: 04/22/17  5:05 AM   Result Value Ref Range    Glucose (POC) 115 (H) 65 - 100 mg/dL   GLUCOSE, POC    Collection Time: 04/22/17  6:05 AM   Result Value Ref Range    Glucose (POC) 123 (H) 65 - 100 mg/dL   GLUCOSE, POC    Collection Time: 04/22/17  7:00 AM   Result Value Ref Range    Glucose (POC) 112 (H) 65 - 100 mg/dL       Assessment:     Stable. Plan transfer today      Plan/Recommendations/Medical Decision Making:     Continue present treatment    Discontinue: Chest tubes today. See orders    Leon Juarez.  Matt Yanez MD

## 2017-04-22 NOTE — PROGRESS NOTES
Patient complaining of right shoulder and back pain. Patient also noted to have right arm weakness. Patient able to move fingers and shrug shoulder. No signs of facial droop. Speech remains clear. Will continue to monitor.

## 2017-04-22 NOTE — PROGRESS NOTES
Respiratory Mechanics completed and are as follows:  Weaning Parameters  Spontaneous Breathing Trial Complete: Yes  Resp Rate Observed: 15  Ve: 7.5  VT: 512  RSBI: 29  NIF: -30  Urena Agitation Sedation Scale (RASS): Alert and calm  Patient extubated to Airvo with 40L flow and 40% FiO2. Patient is able to communicate and is negative for stridor. Breath sounds are clear. No complications with extubation.      Mike Pinto, RT

## 2017-04-22 NOTE — PROGRESS NOTES
Speech Pathology Note:    Orders for speech therapy evaluation discontinued with transfer. On floor, spoke with Vikki Pederson current RN who reports patient is currently not appropriate for swallowing evaluation s/p phenergan. Also reports no difficulty with any/all consistencies per prior nurse report and her own observations. No overt history of swallowing difficulty noted on chart. Last seen by our service 3/7/17 without any difficulty noted and recommendations for Regular diet/thin liquids. Nurse reports will re-consult if swallowing deficits are noted or reported at later time. Will acknowledge discontinuance of prior orders. Danyell Kramer.  MS Holly, CCC-SLP

## 2017-04-22 NOTE — PROGRESS NOTES
PT note: Per RN (Cam), pt just recently walked and is preparing for transfer up to CV Stepdown unit. RN requested to hold therapy until after transfer. Will attempt again at a later time. No treatment provided.  Jerry Guzman, PT

## 2017-04-22 NOTE — PROGRESS NOTES
Problem: Mobility Impaired (Adult and Pediatric)  Goal: *Acute Goals and Plan of Care (Insert Text)  LTG:  (1.)Mr. Stacie Yoo will move from supine to sit and sit to supine , scoot up and down and roll side to side in bed with INDEPENDENCE within 7 day(s). (2.)Mr. Stacie Yoo will transfer from bed to chair and chair to bed with INDEPENDENCE using the least restrictive device within 7 day(s). (3.)Mr. Blanton will ambulate with INDEPENDENCE for >or= 500 feet with the least restrictive device, appropriate gait pattern and good dynamic standing balance within 7 day(s). (4.)Mr. Stacie Yoo will perform B LE therapeutic exercises x 20 reps with INDEPENDENCE within 7 days to increase strength for improved safety and independence in transfers and gait. (5.)Mr. Blanton will ascend/descend 4 steps with 1 handrail(s) and INDEPENDENCE within 7 day(s) for safe entry/exit of home.  ________________________________________________________________________________________________      PHYSICAL THERAPY: INITIAL ASSESSMENT 4/22/2017  INPATIENT: Hospital Day: 2  Payor: Sydnie Marks / Plan: [facility] / Product Type: PPO /      NAME/AGE/GENDER: Mayela Bassett is a 50 y.o. male           PRIMARY DIAGNOSIS: Acute mitral insufficiency [I34.0] Mitral valve regurgitation Mitral valve regurgitation  Procedure(s) (LRB):  MITRAL VALVE REPAIR (MVR) MINIMALLY INVASIVE. CRYOABALATION OF INTERCOSTAL SPACES (N/A)  ESOPHAGEAL TRANS ECHOCARDIOGRAM (N/A)  1 Day Post-Op  ICD-10: Treatment Diagnosis:       · Generalized Muscle Weakness (M62.81)  · Difficulty in walking, Not elsewhere classified (R26.2)   Precaution/Allergies:  Amoxil [amoxicillin] and Codeine       ASSESSMENT:      Mr. Stacie Yoo presents sitting in bedside chair, extremely lethargic. Pt unable to complete a full sentence without falling back into sleep. RN reports pt recently received phenergan for nausea.  Pt now s/p MRV with complaints of extreme R UE pain secondary to positioning while on OR table. R UE currently in sling for comfort. Pt agreeable to treatment but therapist guarded with activity level secondary to pt's extreme fatigue. Pt requested assistance to bathroom. Pt performed sit to stand with minimal assistance for support of R UE and cues for technique/safety and assisted to bathroom with minimal assist. Pt toileted with CGA for balance. Pt experienced increased nausea with movement and was returned to supine with minimal assist per his request. RN present and aware. Constant cues for pt to keep eyes open during treatment secondary to extreme fatigue. Will attempt further ambulation next treatment as pt is more alert and he appears very capable. Will con't to follow. This section established at most recent assessment   PROBLEM LIST (Impairments causing functional limitations):  1. Decreased Strength  2. Decreased ADL/Functional Activities  3. Decreased Transfer Abilities  4. Decreased Ambulation Ability/Technique  5. Decreased Balance  6. Increased Pain  7. Decreased Activity Tolerance  8. Decreased Flexibility/Joint Mobility  9. Decreased [place] with Home Exercise Program    INTERVENTIONS PLANNED: (Benefits and precautions of physical therapy have been discussed with the patient.)  1. Balance Exercise  2. Bed Mobility  3. Family Education  4. Gait Training  5. Home Exercise Program (HEP)  6. Therapeutic Activites  7. Therapeutic Exercise/Strengthening  8. Transfer Training  9. Group Therapy      TREATMENT PLAN: Frequency/Duration: twice daily for duration of hospital stay  Rehabilitation Potential For Stated Goals: GOOD      RECOMMENDED REHABILITATION/EQUIPMENT: (at time of discharge pending progress): Continue Skilled Therapy.                    HISTORY:   History of Present Injury/Illness (Reason for Referral):  Per chart: Mr. Lisa Byrd is a very pleasant 56-year-old gentleman   who has had a several year history of worsening exertional dyspnea. It became so profound that he presented to the   emergency room. An echo was performed which showed severe mitral   regurgitation with flail P2 segment. Transesophageal echo   confirmed this. Left heart catheterization showed clean   coronaries. Past Medical History/Comorbidities:   Mr. Inocencia Nix  has a past medical history of Acute mitral insufficiency; Difficult intubation; GERD (gastroesophageal reflux disease); History of kidney stones (1982); History of kidney stones; History of [place] spotted fever (1983); Hypertension; Mitral regurgitation; Obesity (BMI 30-39.9) (04/17/2017); Pancreatic cyst; PONV (postoperative nausea and vomiting); Sleep apnea; and Thyroid nodule. Mr. Inocencia Nix  has a past surgical history that includes urological (1983); heent; sinus surgery proc unlisted (2002); lap cholecystectomy (2009); and ercp. Social History/Living Environment:   Home Environment: Private residence  # Steps to Enter: 3  Rails to Enter: Yes  Hand Rails : Bilateral  Wheelchair Ramp: No  One/Two Story Residence: Two story  # of Interior Steps: 12  Interior Rails: Left  Lift Chair Available: No  Living Alone: No  Support Systems: Spouse/Significant Other/Partner  Patient Expects to be Discharged to[de-identified] Private residence  Current DME Used/Available at Home: Blood pressure cuff  Tub or Shower Type: Shower  Prior Level of Function/Work/Activity:  Pt lives with wife. Works. Drives. No falls. Independent with all functional mobility and gait without assistive device. Independent with all ADLs.       Number of Personal Factors/Comorbidities that affect the Plan of Care: 1-2: MODERATE COMPLEXITY   EXAMINATION:   Most Recent Physical Functioning:   Gross Assessment:  AROM: Grossly decreased, non-functional (R shoulder secondary to surgical pain)  Strength: Generally decreased, functional  Coordination: Within functional limits  Sensation: Intact               Posture:  Posture (WDL): Exceptions to WDL  Posture Assessment: Asymmetry (comment) (R UE in sling)  Balance:  Sitting: Impaired  Sitting - Static: Good (unsupported)  Sitting - Dynamic: Fair (occasional)  Standing: Impaired  Standing - Static: Fair  Standing - Dynamic : Fair Bed Mobility:  Rolling: Minimum assistance  Supine to Sit:  (not tested)  Sit to Supine: Minimum assistance  Wheelchair Mobility:     Transfers:  Sit to Stand: Minimum assistance  Gait:     Base of Support: Widened  Speed/Tracey: Slow  Gait Abnormalities: Decreased step clearance;Trunk sway increased  Distance (ft): 20 Feet (ft)  Ambulation - Level of Assistance: Minimal assistance       Body Structures Involved:  1. Heart  2. Joints  3. Muscles Body Functions Affected:  1. Cardio  2. Neuromusculoskeletal  3. Movement Related Activities and Participation Affected:  1. General Tasks and Demands  2. Mobility  3. Self Care  4. Domestic Life   Number of elements that affect the Plan of Care: 4+: HIGH COMPLEXITY   CLINICAL PRESENTATION:   Presentation: Evolving clinical presentation with changing clinical characteristics: MODERATE COMPLEXITY   CLINICAL DECISION MAKIN [facility] Inpatient Short Form  How much difficulty does the patient currently have. .. Unable A Lot A Little None   1. Turning over in bed (including adjusting bedclothes, sheets and blankets)? [ ] 1   [ ] 2   [X] 3   [ ] 4   2. Sitting down on and standing up from a chair with arms ( e.g., wheelchair, bedside commode, etc.)   [ ] 1   [ ] 2   [X] 3   [ ] 4   3. Moving from lying on back to sitting on the side of the bed? [ ] 1   [ ] 2   [X] 3   [ ] 4   How much help from another person does the patient currently need. .. Total A Lot A Little None   4. Moving to and from a bed to a chair (including a wheelchair)? [ ] 1   [ ] 2   [X] 3   [ ] 4   5. Need to walk in hospital room? [ ] 1   [ ] 2   [X] 3   [ ] 4   6. Climbing 3-5 steps with a railing?    [ ] 1   [ ] 2 [X] 3   [ ] 4   © 2007, Trustees of [address] [place] [address] [address] [id number], under license to [employer]. All rights reserved    Score:  Initial: 18 Most Recent: X (Date: -- )     Interpretation of Tool:  Represents activities that are increasingly more difficult (i.e. Bed mobility, Transfers, Gait). Score 24 23 22-20 19-15 14-10 9-7 6       Modifier CH CI CJ CK CL CM CN         · Mobility - Walking and Moving Around:               - CURRENT STATUS:    CK - 40%-59% impaired, limited or restricted               - GOAL STATUS:           CK - 40%-59% impaired, limited or restricted               - D/C STATUS:                       ---------------To be determined---------------  Payor: BLUE CROSS / Plan: [place]UE [place]OSS [facility] / Product Type: PPO /       Medical Necessity:     · Patient demonstrates good rehab potential due to higher previous functional level. Reason for Services/Other Comments:  · Patient continues to require present interventions due to patient's inability to function at baseline. Use of outcome tool(s) and clinical judgement create a POC that gives a: Questionable prediction of patient's progress: MODERATE COMPLEXITY                 TREATMENT:   (In addition to Assessment/Re-Assessment sessions the following treatments were rendered)   Pre-treatment Symptoms/Complaints: \"My shoulder hurts so bad. \"  Pain: Initial:   Pain Intensity 1: 10  Pain Location 1: Shoulder  Pain Orientation 1: Right  Pain Intervention(s) 1: Ice, Ambulation/Increased Activity, Repositioned, Emotional support (ice applied by RN)  Post Session:  10/10      Assessment/Reassessment only, no treatment provided today     Braces/Orthotics/Lines/Etc:   · O2 Device: Nasal cannula  Treatment/Session Assessment:    · Response to Treatment:  No treatment provided.   · Interdisciplinary Collaboration:  · Physical Therapist  · Registered Nurse  · After treatment position/precautions:  · Supine in bed  · Bed/Chair-wheels locked  · Bed in low position  · Call light within reach  · RN notified  · Family at bedside  · Compliance with Program/Exercises: Will assess as treatment progresses. · Recommendations/Intent for next treatment session: \"Next visit will focus on advancements to more challenging activities and reduction in assistance provided\".   Total Treatment Duration:  PT Patient Time In/Time Out  Time In: 1448  Time Out: 622 [facility],

## 2017-04-22 NOTE — PROGRESS NOTES
TRANSFER - OUT REPORT:    Verbal report given to Keren Kern RN(name) on Dank Rom  being transferred to [facility](unit) for routine progression of care       Report consisted of patients Situation, Background, Assessment and   Recommendations(SBAR). Information from the following report(s) SBAR, OR Summary, Procedure Summary, Intake/Output, MAR, Accordion, Recent Results and Med Rec Status NSR was reviewed with the receiving nurse. Lines:   Peripheral IV 04/21/17 Right Hand (Active)   Site Assessment Clean, dry, & intact 4/22/2017 11:00 AM   Phlebitis Assessment 0 4/22/2017 11:00 AM   Infiltration Assessment 0 4/22/2017 11:00 AM   Dressing Status Clean, dry, & intact 4/22/2017 11:00 AM   Dressing Type Tape;Transparent 4/22/2017 11:00 AM   Hub Color/Line Status Green;Patent 4/22/2017 11:00 AM   Action Taken Open ports on tubing capped 4/21/2017  2:13 PM        Opportunity for questions and clarification was provided.       Patient transported with:   O2 @ 2 liters  Registered Nurse

## 2017-04-22 NOTE — PROGRESS NOTES
Critical Care Daily Progress Note: 4/22/2017    Mayela Bassett   Admission Date: 4/21/2017         The patient's chart is reviewed and the patient is discussed with the staff. Subjective:   49 y/o male Pt has a history of HTN, BRITTNEY(never got CPAP), GERD, and prior history of herpes infection of left eye noted to have heart murmur about 2 years ago. Pt presented to the ER at [facility] with tingling in left side of face and down left arm. Imaging negative for CVA and pt felt to have TIA. Pt had echo which revealed EF 55-60%, no WMA, MV with moderate prolapse involving the posterior leaflet with some nodular appearance of of the posterior leaflet and MARCUS recommended to exclude vegetation. He had MARCUS which revealed no vegetation but did reveal significant posterior leaflet on MV regurgitation. He had LHC with normal coronaries and severe mitral regurgitation with flail P2. He was referred to Dr. Sandee Smith and felt appropriate for MV replacement. Pt admitted 4/21 for elective mini MVR.     Extubated last pm- c/os of left shoulder pain and numbness in left arm    Current Facility-Administered Medications   Medication Dose Route Frequency    0.9% sodium chloride infusion  25 mL/hr IntraVENous CONTINUOUS    dextrose 5% - 0.45% NaCl with KCl 20 mEq/L infusion  25 mL/hr IntraVENous CONTINUOUS    sodium chloride (NS) flush 5-10 mL  5-10 mL IntraVENous Q8H    sodium chloride (NS) flush 5-10 mL  5-10 mL IntraVENous PRN    oxyCODONE-acetaminophen (PERCOCET) 5-325 mg per tablet 1 Tab  1 Tab Oral Q4H PRN    naloxone (NARCAN) injection 0.4 mg  0.4 mg IntraVENous PRN    mupirocin (BACTROBAN) 2 % ointment   Both Nostrils BID    sodium bicarbonate 8.4 % (1 mEq/mL) injection 50 mEq  50 mEq IntraVENous PRN    EPINEPHrine (ADRENALIN) 4,000 mcg in 0.9% sodium chloride 250 mL infusion  0.05-0.1 mcg/kg/min IntraVENous TITRATE    nitroglycerin (Tridil) 200 mcg/ml infusion   mcg/min IntraVENous TITRATE    PHENYLephrine (NEOSYNEPHRINE) 30,000 mcg in 0.9% sodium chloride 250 mL infusion   mcg/min IntraVENous TITRATE    lidocaine (PF) (XYLOCAINE) 100 mg/5 mL (2 %) injection syringe  mg   mg IntraVENous ONCE PRN    amiodarone (CORDARONE) tablet 200 mg  200 mg Oral Q12H    metoprolol tartrate (LOPRESSOR) tablet 25 mg  25 mg Oral Q12H    insulin regular (NOVOLIN R, HUMULIN R) 100 Units in 0.9% sodium chloride 100 mL infusion  1 Units/hr IntraVENous TITRATE    dextrose (D50W) injection syrg 12.5 g  25 mL IntraVENous PRN    aspirin chewable tablet 81 mg  81 mg Oral DAILY    magnesium sulfate 1 g/100 ml IVPB (premix or compounded)  1 g IntraVENous PRN    potassium chloride 10 mEq in 50 ml IVPB  10 mEq IntraVENous PRN    meperidine (DEMEROL) injection 25 mg  25 mg IntraVENous Q1H PRN    chlorhexidine (PERIDEX) 0.12 % mouthwash 10 mL  10 mL Oral BID    famotidine (PF) (PEPCID) injection 20 mg  20 mg IntraVENous Q12H    ondansetron (ZOFRAN) injection 4 mg  4 mg IntraVENous Q4H PRN    oxyCODONE-acetaminophen (PERCOCET 10)  mg per tablet 1 Tab  1 Tab Oral Q6H PRN    traMADol (ULTRAM) tablet 50 mg  50 mg Oral Q6H PRN    HYDROmorphone (PF) (DILAUDID) injection 1 mg  1 mg IntraVENous Q4H PRN    promethazine (PHENERGAN) with saline injection 12.5 mg  12.5 mg IntraVENous Q6H PRN       Review of Systems    Constitutional:  negative for fever, chills, sweats  Cardiovascular:  negative for chest pain, palpitations, syncope, edema  Gastrointestinal:  negative for dysphagia, reflux, vomiting, diarrhea, abdominal pain, or melena  Neurologic:  negative for focal weakness, numbness, headache      Objective:     Vitals:    04/22/17 0345 04/22/17 0400 04/22/17 0415 04/22/17 0430   BP: 127/80 137/81 147/75 140/77   Pulse: 77 78 77 80   Resp: 28 25 29    Temp:       SpO2: 97% 96% 97% 96%   Weight:       Height:           Intake and Output:   04/20 0701 - 04/21 1900  In: 1815 [I.V.:900]  Out: 1845 [GXWTZ:9961; Drains:400]  04/21 1901 - 04/22 0700  In: -   Out: 6417 [Urine:788; Drains:370]    Physical Exam:          Constitutional:  the patient is well developed and in no acute distress  EENMT:  Sclera clear, pupils equal, oral mucosa moist  Respiratory:  Decreased breath sounds but clear  Cardiovascular:  RRR without M,G,R  Gastrointestinal: soft and non-tender; with positive bowel sounds. Musculoskeletal: warm without cyanosis. There is no lower leg edema. Skin:  no jaundice or rashes, chest wounds   Neurologic: no gross neuro deficits     Psychiatric:  alert and oriented x 3    LINES: central    DRIPS:   none    CXR: pending      LAB  Recent Labs      04/22/17   0420  04/22/17   0304  04/22/17   0127  04/22/17   0016  04/21/17   2311   GLUCPOC  129*  135*  140*  141*  141*      Recent Labs      04/22/17   0200 04/21/17 2200 04/21/17   1800  04/21/17   1425   WBC  11.7*   --    --   20.7*   HGB  13.1*  13.9  14.1  14.3   HCT  36.7*  38.4*  39.4*  40.6*   PLT  143*   --    --   159   INR   --    --    --   1.1     Recent Labs      04/22/17   0200  04/21/17   2200  04/21/17   1800  04/21/17   1425   NA  146*   --    --   148*   K  4.5  4.5  4.9  4.1   CL  112*   --    --   111*   CO2  24   --    --   24   GLU  141*   --    --   121*   BUN  13   --    --   14   CREA  1.36   --    --   1.69*   MG  2.1  2.2  2.3  2.8*   CA  7.6*   --    --   8.0*     Recent Labs      04/21/17   1915 04/21/17   1810  04/21/17   1424   PH  7.33*  7.28*  7.20*   PCO2  33*  36  50*   PO2  87  75  83   HCO3  17*  17*  19*     No results for input(s): LCAD, LAC in the last 72 hours.     Assessment:  (Medical Decision Making)     Hospital Problems  Date Reviewed: 4/21/2017          Codes Class Noted POA    Mitral valve regurgitation ICD-10-CM: I34.0  ICD-9-CM: 424.0  4/21/2017 Yes    S/p surgeruy    Encounter for weaning from ventilator [facility]) ICD-10-CM: Z99.11  ICD-9-CM: V46.13  4/21/2017 No    Tolerating extubation    Essential hypertension ICD-10-CM: I10  ICD-9-CM: 401.9  3/7/2017 Yes        Shortness of breath ICD-10-CM: R06.02  ICD-9-CM: 786.05  3/7/2017 Yes        BRITTNEY (obstructive sleep apnea) ICD-10-CM: G47.33  ICD-9-CM: 327.23  3/7/2017 Yes    cpap last pm    GERD (gastroesophageal reflux disease) ICD-10-CM: K21.9  ICD-9-CM: 530.81  3/7/2017 Yes              Plan:  (Medical Decision Making)   1  airvo  2  Pain meds for shoulder  3  oob  --    More than 50% of the time documented was spent in face-to-face contact with the patient and in the care of the patient on the floor/unit where the patient is located.     Huy Ross MD

## 2017-04-22 NOTE — PROGRESS NOTES
Pt with large amount of clear tan emesis immediately after administration of morning medications. 12.5 phenergan given per orders. Pt denies nausea and walked approx 30 feet. Pt has no complaints, stable. Will monitor.

## 2017-04-22 NOTE — PROGRESS NOTES
Pt. Instructed that chest tubes would be removed and how. Pt. Instructed to take deep breath and hold during extraction. Pt.'s chest tubes removed without incident. Sutures triple tied. Petroleum guaze placed over site. Site bandaged with 4x4 and tape. Pacer wires isoslated and taped. Sifuentes cath removed per orders without incident. Pt.'s SWAN introducer cath removed and cath tip intact. Pressure held using a 2x2 with triple antibiotic ointment on it and covered with a tegaderm. No bleeding noted at the site. Will continue to monitor.

## 2017-04-22 NOTE — PROGRESS NOTES
Pt given complete bed bath and linens changed. Pt turned and skin inspected. No breakdown noted to sacrum/buttocks, all areas intact. Allevyn in place. No breakdown under Allevyn. Will continue to monitor.

## 2017-04-22 NOTE — PROGRESS NOTES
Assisted patient out of bed and with ambulation in unit. Patient was able to ambulate 25 ft without distress. Patient was a moderate 2 person assist. Patient returned to recliner and placed on 2 liters of oxygen via nasal cannula. Vital signs remain stable. Will continue to monitor.

## 2017-04-23 ENCOUNTER — APPOINTMENT (OUTPATIENT)
Dept: GENERAL RADIOLOGY | Age: 52
DRG: 221 | End: 2017-04-23
Attending: THORACIC SURGERY (CARDIOTHORACIC VASCULAR SURGERY)
Payer: COMMERCIAL

## 2017-04-23 PROBLEM — G47.33 OSA (OBSTRUCTIVE SLEEP APNEA): Chronic | Status: ACTIVE | Noted: 2017-03-07

## 2017-04-23 PROBLEM — K21.9 GERD (GASTROESOPHAGEAL REFLUX DISEASE): Chronic | Status: ACTIVE | Noted: 2017-03-07

## 2017-04-23 PROBLEM — I10 ESSENTIAL HYPERTENSION: Chronic | Status: ACTIVE | Noted: 2017-03-07

## 2017-04-23 PROBLEM — E04.1 THYROID NODULE: Chronic | Status: ACTIVE | Noted: 2017-04-23

## 2017-04-23 LAB
ANION GAP BLD CALC-SCNC: 7 MMOL/L (ref 7–16)
BUN SERPL-MCNC: 13 MG/DL (ref 6–23)
CALCIUM SERPL-MCNC: 8.4 MG/DL (ref 8.3–10.4)
CHLORIDE SERPL-SCNC: 104 MMOL/L (ref 98–107)
CO2 SERPL-SCNC: 32 MMOL/L (ref 21–32)
CREAT SERPL-MCNC: 1.27 MG/DL (ref 0.8–1.5)
ERYTHROCYTE [DISTWIDTH] IN BLOOD BY AUTOMATED COUNT: 12.6 % (ref 11.9–14.6)
GLUCOSE BLD STRIP.AUTO-MCNC: 121 MG/DL (ref 65–100)
GLUCOSE SERPL-MCNC: 109 MG/DL (ref 65–100)
HCT VFR BLD AUTO: 33.3 % (ref 41.1–50.3)
HGB BLD-MCNC: 11.4 G/DL (ref 13.6–17.2)
MAGNESIUM SERPL-MCNC: 2.3 MG/DL (ref 1.8–2.4)
MCH RBC QN AUTO: 31 PG (ref 26.1–32.9)
MCHC RBC AUTO-ENTMCNC: 34.2 G/DL (ref 31.4–35)
MCV RBC AUTO: 90.5 FL (ref 79.6–97.8)
MM INDURATION POC: 0 MM (ref 0–5)
PLATELET # BLD AUTO: 124 K/UL (ref 150–450)
PMV BLD AUTO: 12.3 FL (ref 10.8–14.1)
POTASSIUM SERPL-SCNC: 4.4 MMOL/L (ref 3.5–5.1)
PPD POC: NEGATIVE NEGATIVE
RBC # BLD AUTO: 3.68 M/UL (ref 4.23–5.67)
SODIUM SERPL-SCNC: 143 MMOL/L (ref 136–145)
WBC # BLD AUTO: 12.6 K/UL (ref 4.3–11.1)

## 2017-04-23 PROCEDURE — 74011250637 HC RX REV CODE- 250/637: Performed by: NURSE PRACTITIONER

## 2017-04-23 PROCEDURE — 36415 COLL VENOUS BLD VENIPUNCTURE: CPT | Performed by: THORACIC SURGERY (CARDIOTHORACIC VASCULAR SURGERY)

## 2017-04-23 PROCEDURE — 74011250637 HC RX REV CODE- 250/637: Performed by: THORACIC SURGERY (CARDIOTHORACIC VASCULAR SURGERY)

## 2017-04-23 PROCEDURE — 83735 ASSAY OF MAGNESIUM: CPT | Performed by: THORACIC SURGERY (CARDIOTHORACIC VASCULAR SURGERY)

## 2017-04-23 PROCEDURE — 71020 XR CHEST PA LAT: CPT

## 2017-04-23 PROCEDURE — 85027 COMPLETE CBC AUTOMATED: CPT | Performed by: THORACIC SURGERY (CARDIOTHORACIC VASCULAR SURGERY)

## 2017-04-23 PROCEDURE — 74011250636 HC RX REV CODE- 250/636: Performed by: THORACIC SURGERY (CARDIOTHORACIC VASCULAR SURGERY)

## 2017-04-23 PROCEDURE — 82962 GLUCOSE BLOOD TEST: CPT

## 2017-04-23 PROCEDURE — 97530 THERAPEUTIC ACTIVITIES: CPT

## 2017-04-23 PROCEDURE — 80048 BASIC METABOLIC PNL TOTAL CA: CPT | Performed by: NURSE PRACTITIONER

## 2017-04-23 PROCEDURE — 65660000004 HC RM CVT STEPDOWN

## 2017-04-23 PROCEDURE — 99232 SBSQ HOSP IP/OBS MODERATE 35: CPT | Performed by: INTERNAL MEDICINE

## 2017-04-23 RX ORDER — AMOXICILLIN 250 MG
2 CAPSULE ORAL 2 TIMES DAILY
Status: DISCONTINUED | OUTPATIENT
Start: 2017-04-23 | End: 2017-04-25 | Stop reason: HOSPADM

## 2017-04-23 RX ORDER — OXYCODONE AND ACETAMINOPHEN 5; 325 MG/1; MG/1
1 TABLET ORAL
Status: DISCONTINUED | OUTPATIENT
Start: 2017-04-23 | End: 2017-04-24

## 2017-04-23 RX ORDER — GUAIFENESIN 600 MG/1
1200 TABLET, EXTENDED RELEASE ORAL 2 TIMES DAILY
Status: DISCONTINUED | OUTPATIENT
Start: 2017-04-23 | End: 2017-04-24

## 2017-04-23 RX ORDER — SIMETHICONE 80 MG
80 TABLET,CHEWABLE ORAL
Status: DISCONTINUED | OUTPATIENT
Start: 2017-04-23 | End: 2017-04-25 | Stop reason: HOSPADM

## 2017-04-23 RX ORDER — BISACODYL 5 MG
10 TABLET, DELAYED RELEASE (ENTERIC COATED) ORAL DAILY PRN
Status: DISCONTINUED | OUTPATIENT
Start: 2017-04-23 | End: 2017-04-25 | Stop reason: HOSPADM

## 2017-04-23 RX ORDER — IBUPROFEN 400 MG/1
400 TABLET ORAL 4 TIMES DAILY
Status: DISCONTINUED | OUTPATIENT
Start: 2017-04-23 | End: 2017-04-24

## 2017-04-23 RX ORDER — OXYCODONE AND ACETAMINOPHEN 10; 325 MG/1; MG/1
1 TABLET ORAL
Status: DISCONTINUED | OUTPATIENT
Start: 2017-04-23 | End: 2017-04-23

## 2017-04-23 RX ORDER — ONDANSETRON 2 MG/ML
4 INJECTION INTRAMUSCULAR; INTRAVENOUS
Status: COMPLETED | OUTPATIENT
Start: 2017-04-23 | End: 2017-04-23

## 2017-04-23 RX ADMIN — OXYCODONE HYDROCHLORIDE AND ACETAMINOPHEN 1 TABLET: 5; 325 TABLET ORAL at 22:55

## 2017-04-23 RX ADMIN — IBUPROFEN 400 MG: 400 TABLET, FILM COATED ORAL at 17:30

## 2017-04-23 RX ADMIN — POTASSIUM CHLORIDE 10 MEQ: 10 TABLET, EXTENDED RELEASE ORAL at 09:07

## 2017-04-23 RX ADMIN — METOPROLOL TARTRATE 25 MG: 25 TABLET ORAL at 09:06

## 2017-04-23 RX ADMIN — METOPROLOL TARTRATE 25 MG: 25 TABLET ORAL at 20:03

## 2017-04-23 RX ADMIN — OXYCODONE HYDROCHLORIDE AND ACETAMINOPHEN 1 TABLET: 10; 325 TABLET ORAL at 02:47

## 2017-04-23 RX ADMIN — AMIODARONE HYDROCHLORIDE 200 MG: 200 TABLET ORAL at 20:49

## 2017-04-23 RX ADMIN — ONDANSETRON 4 MG: 2 INJECTION INTRAMUSCULAR; INTRAVENOUS at 09:18

## 2017-04-23 RX ADMIN — GUAIFENESIN 1200 MG: 600 TABLET, EXTENDED RELEASE ORAL at 10:12

## 2017-04-23 RX ADMIN — ASPIRIN 81 MG: 81 TABLET, COATED ORAL at 09:07

## 2017-04-23 RX ADMIN — MUPIROCIN: 20 OINTMENT TOPICAL at 09:26

## 2017-04-23 RX ADMIN — FAMOTIDINE 20 MG: 20 TABLET ORAL at 20:03

## 2017-04-23 RX ADMIN — OXYCODONE HYDROCHLORIDE AND ACETAMINOPHEN 1 TABLET: 10; 325 TABLET ORAL at 07:17

## 2017-04-23 RX ADMIN — FAMOTIDINE 20 MG: 20 TABLET ORAL at 09:06

## 2017-04-23 RX ADMIN — IBUPROFEN 400 MG: 400 TABLET, FILM COATED ORAL at 10:12

## 2017-04-23 RX ADMIN — Medication 10 ML: at 20:03

## 2017-04-23 RX ADMIN — BISACODYL 10 MG: 5 TABLET, COATED ORAL at 20:49

## 2017-04-23 RX ADMIN — Medication: at 10:17

## 2017-04-23 RX ADMIN — MUPIROCIN: 20 OINTMENT TOPICAL at 18:24

## 2017-04-23 RX ADMIN — TRAMADOL HYDROCHLORIDE 50 MG: 50 TABLET, FILM COATED ORAL at 14:44

## 2017-04-23 RX ADMIN — ONDANSETRON 4 MG: 2 INJECTION INTRAMUSCULAR; INTRAVENOUS at 02:47

## 2017-04-23 RX ADMIN — ACYCLOVIR 200 MG: 200 CAPSULE ORAL at 09:07

## 2017-04-23 RX ADMIN — Medication: at 20:04

## 2017-04-23 RX ADMIN — ONDANSETRON 4 MG: 2 INJECTION INTRAMUSCULAR; INTRAVENOUS at 20:16

## 2017-04-23 RX ADMIN — AMIODARONE HYDROCHLORIDE 200 MG: 200 TABLET ORAL at 09:06

## 2017-04-23 RX ADMIN — GUAIFENESIN 1200 MG: 600 TABLET, EXTENDED RELEASE ORAL at 17:28

## 2017-04-23 RX ADMIN — OXYCODONE HYDROCHLORIDE AND ACETAMINOPHEN 1 TABLET: 5; 325 TABLET ORAL at 12:20

## 2017-04-23 RX ADMIN — ONDANSETRON 4 MG: 2 INJECTION INTRAMUSCULAR; INTRAVENOUS at 05:27

## 2017-04-23 RX ADMIN — SIMETHICONE CHEW TAB 80 MG 80 MG: 80 TABLET ORAL at 23:04

## 2017-04-23 RX ADMIN — Medication 10 ML: at 14:50

## 2017-04-23 RX ADMIN — Medication 10 ML: at 05:05

## 2017-04-23 RX ADMIN — OXYCODONE HYDROCHLORIDE AND ACETAMINOPHEN 1 TABLET: 5; 325 TABLET ORAL at 18:21

## 2017-04-23 RX ADMIN — STANDARDIZED SENNA CONCENTRATE AND DOCUSATE SODIUM 2 TABLET: 8.6; 5 TABLET, FILM COATED ORAL at 17:32

## 2017-04-23 RX ADMIN — STANDARDIZED SENNA CONCENTRATE AND DOCUSATE SODIUM 2 TABLET: 8.6; 5 TABLET, FILM COATED ORAL at 09:06

## 2017-04-23 RX ADMIN — IBUPROFEN 400 MG: 400 TABLET, FILM COATED ORAL at 20:05

## 2017-04-23 RX ADMIN — FUROSEMIDE 20 MG: 20 TABLET ORAL at 09:06

## 2017-04-23 NOTE — PROGRESS NOTES
Donna Thornton  Admission Date: 4/21/2017             Daily Progress Note: 4/23/2017    The patient's chart is reviewed and the patient is discussed with the staff. 51 yo male with a history of BRITTNEY, HTN, GERD, prior history of herpes infection of left eye, BRITTNEY, and MRCompa Presented to the ER at [facility] with tingling in left side of face and down left arm. Imaging negative for CVA and pt felt to have TIA. Pt had echo which revealed EF 55-60%, no WMA, MV with moderate prolapse involving the posterior leaflet with some nodular appearance of of the posterior leaflet and MARCUS recommended to exclude vegetation. He had MARCUS which revealed no vegetation but did reveal significant posterior leaflet on MV regurgitation. He had LHC with normal coronaries and severe mitral regurgitation with flail P2. He was referred to Dr. Glenn Frias and felt appropriate for MV replacement. Pt was admitted for elective mini MVR which was completed on 4/21/17 per Dr. Glenn Frias. Post-op course complicated by ongoing shoulder pain. Subjective:     Keeps eyes closed during exam, dozing intermittently with light snoring. RA sat 87% - remains on o2 at 2 lpm.  + cough productive of moderate amounts of dark colored mucus. Using IS and able to draw ~1500 ml. Continues to have R shoulder pain.      Current Facility-Administered Medications   Medication Dose Route Frequency    oxyCODONE-acetaminophen (PERCOCET 10)  mg per tablet 1 Tab  1 Tab Oral Q4H PRN    senna-docusate (PERICOLACE) 8.6-50 mg per tablet 2 Tab  2 Tab Oral BID    acyclovir (ZOVIRAX) capsule 200 mg  200 mg Oral DAILY    sodium chloride (NS) flush 5-10 mL  5-10 mL IntraVENous Q8H    sodium chloride (NS) flush 5-10 mL  5-10 mL IntraVENous PRN    furosemide (LASIX) tablet 20 mg  20 mg Oral DAILY    alum-mag hydroxide-simeth (MYLANTA) oral suspension 30 mL  30 mL Oral Q4H PRN    acetaminophen (TYLENOL) tablet 650 mg  650 mg Oral Q4H PRN    zolpidem (AMBIEN) tablet 5 mg  5 mg Oral QHS PRN    mupirocin (BACTROBAN) 2 % ointment   Both Nostrils BID    aspirin delayed-release tablet 81 mg  81 mg Oral DAILY    magnesium oxide (MAG-OX) tablet 400 mg  400 mg Oral TID PRN    magnesium oxide (MAG-OX) tablet 400 mg  400 mg Oral QID PRN    potassium chloride (K-DUR, KLOR-CON) tablet 10 mEq  10 mEq Oral DAILY    potassium chloride (K-DUR, KLOR-CON) SR tablet 20 mEq  20 mEq Oral BID PRN    potassium chloride (K-DUR, KLOR-CON) SR tablet 40 mEq  40 mEq Oral BID PRN    ondansetron (ZOFRAN) injection 4 mg  4 mg IntraVENous Q4H PRN    famotidine (PEPCID) tablet 20 mg  20 mg Oral Q12H    scopolamine (TRANSDERM-SCOP) 1.5 mg  1.5 mg TransDERmal R58W    methyl salicylate-menthol (BENGAY) 15-10 % cream   Topical Q12H    amiodarone (CORDARONE) tablet 200 mg  200 mg Oral Q12H    metoprolol tartrate (LOPRESSOR) tablet 25 mg  25 mg Oral Q12H    traMADol (ULTRAM) tablet 50 mg  50 mg Oral Q6H PRN    HYDROmorphone (PF) (DILAUDID) injection 1 mg  1 mg IntraVENous Q4H PRN       Review of Systems  Constitutional: negative for fever, chills, sweats  Cardiovascular: negative for chest pain, palpitations, syncope, edema  Gastrointestinal:  negative for dysphagia, reflux, vomiting, diarrhea, abdominal pain, or melena  Neurologic:  negative for focal weakness, numbness, headache    Objective:     Vitals:    04/22/17 2234 04/23/17 0247 04/23/17 0254 04/23/17 0659   BP: 130/69 136/78  126/65   Pulse: 86 90  87   Resp: 18 18  18   Temp: 98.1 °F (36.7 °C) 97.3 °F (36.3 °C)  98.8 °F (37.1 °C)   SpO2: 94% 95%  90%   Weight:   275 lb (124.7 kg)    Height:         Intake and Output:   04/21 1901 - 04/23 0700  In: 410 [P.O.:410]  Out: 2681 [Urine:1656; Drains:510]       Physical Exam:   Constitution:  the patient is well developed and in no acute distress  EENMT:  Sclera clear, pupils equal, oral mucosa moist  Respiratory: diminished bilaterally, O2 at 2 lpm  Cardiovascular:  RRR without M,G,R  Gastrointestinal: soft and non-tender; with positive bowel sounds. Musculoskeletal: warm without cyanosis. There is no lower leg edema. Skin:  no jaundice or rashes, R flank incision   Neurologic: no gross neuro deficits     Psychiatric:  Drowsy, wakes easily to voice and oriented x 3    CHEST XRAY:   4/23          LAB  Recent Labs      04/23/17   0648  04/22/17 2014 04/22/17   1608  04/22/17   0928  04/22/17   0700   GLUCPOC  121*  117*  128*  116*  112*      Recent Labs      04/23/17   0505  04/22/17   0200  04/21/17   2200  04/21/17   1800  04/21/17   1425   WBC  12.6*  11.7*   --    --   20.7*   HGB  11.4*  13.1*  13.9  14.1  14.3   HCT  33.3*  36.7*  38.4*  39.4*  40.6*   PLT  124*  143*   --    --   159   INR   --    --    --    --   1.1     Recent Labs      04/23/17   0505  04/22/17   0200  04/21/17   2200   04/21/17   1425   NA   --   146*   --    --   148*   K  4.4  4.5  4.5   < >  4.1   CL   --   112*   --    --   111*   CO2   --   24   --    --   24   GLU   --   141*   --    --   121*   BUN   --   13   --    --   14   CREA   --   1.36   --    --   1.69*   MG  2.3  2.1  2.2   < >  2.8*   CA   --   7.6*   --    --   8.0*    < > = values in this interval not displayed. Recent Labs      04/21/17   1915  04/21/17   1810  04/21/17   1424   PH  7.33*  7.28*  7.20*   PCO2  33*  36  50*   PO2  87  75  83   HCO3  17*  17*  19*       Assessment:  (Medical Decision Making)     Hospital Problems  Date Reviewed: 4/23/2017          Codes Class Noted POA    Thyroid nodule (Chronic) ICD-10-CM: E04.1  ICD-9-CM: 241.0  4/23/2017 Yes     On US carotid 4/17         Hypoxemia ICD-10-CM: R09.02  ICD-9-CM: 799.02  4/22/2017 Unknown    Currently on O2 at 2 lpm  RA sats in the 80s      * (Principal)Mitral valve regurgitation ICD-10-CM: I34.0  ICD-9-CM: 424.0  4/21/2017 Yes     4/21/17 (Dr Glenn Frias) 1. Minimally invasive mitral valve repair with P2 resection and   32 mm Physio II annuloplasty ring.    2. Cryo intercostal nerve block for 5 segments. Essential hypertension (Chronic) ICD-10-CM: I10  ICD-9-CM: 401.9  3/7/2017 Yes        Shortness of breath ICD-10-CM: R06.02  ICD-9-CM: 786.05  3/7/2017 Yes        BRITTNEY (obstructive sleep apnea) (Chronic) ICD-10-CM: A78.00  ICD-9-CM: 327.23  3/7/2017 Yes    Patient reports he was tested many years ago with CPAP recommended  Light snoring even while sitting up in chair      GERD (gastroesophageal reflux disease) (Chronic) ICD-10-CM: K21.9  ICD-9-CM: 530.81  3/7/2017 Yes           Plan:  (Medical Decision Making)       --motrin for shoulder pain  --IS  --add mucinex  --PT following for mobility - ambulated 20' yesterday  --would likely benefit from outpatient PSG     More than 50% of the time documented was spent in face-to-face contact with the patient and in the care of the patient on the floor/unit where the patient is located. Abby Alvarez NP          Lungs:  Distant   Heart:  RRR with no Murmur/Rubs/Gallops    Additional Comments:  Needs another PSG    I have spoken with and examined the patient. I agree with the above assessment and plan as documented.     Nelson Fagan MD

## 2017-04-23 NOTE — PROGRESS NOTES
Critical Care Daily Progress Note: 4/23/2017  Admission Date: 4/21/2017    SURGICAL  Procedure(s):  MITRAL VALVE REPAIR (MVR) MINIMALLY INVASIVE. CRYOABALATION OF INTERCOSTAL SPACES  ESOPHAGEAL TRANS ECHOCARDIOGRAM --- 2 Days Post-Op :  4/21/2017    Chart Reviewed.   Subjective:     R shoulder in sling, unable to abduct - most painful   Very sedated, falling asleep in mid sentence    Objective:     Current Facility-Administered Medications   Medication Dose Route Frequency    oxyCODONE-acetaminophen (PERCOCET 10)  mg per tablet 1 Tab  1 Tab Oral Q4H PRN    senna-docusate (PERICOLACE) 8.6-50 mg per tablet 2 Tab  2 Tab Oral BID    acyclovir (ZOVIRAX) capsule 200 mg  200 mg Oral DAILY    sodium chloride (NS) flush 5-10 mL  5-10 mL IntraVENous Q8H    sodium chloride (NS) flush 5-10 mL  5-10 mL IntraVENous PRN    furosemide (LASIX) tablet 20 mg  20 mg Oral DAILY    alum-mag hydroxide-simeth (MYLANTA) oral suspension 30 mL  30 mL Oral Q4H PRN    acetaminophen (TYLENOL) tablet 650 mg  650 mg Oral Q4H PRN    zolpidem (AMBIEN) tablet 5 mg  5 mg Oral QHS PRN    mupirocin (BACTROBAN) 2 % ointment   Both Nostrils BID    aspirin delayed-release tablet 81 mg  81 mg Oral DAILY    magnesium oxide (MAG-OX) tablet 400 mg  400 mg Oral TID PRN    magnesium oxide (MAG-OX) tablet 400 mg  400 mg Oral QID PRN    potassium chloride (K-DUR, KLOR-CON) tablet 10 mEq  10 mEq Oral DAILY    potassium chloride (K-DUR, KLOR-CON) SR tablet 20 mEq  20 mEq Oral BID PRN    potassium chloride (K-DUR, KLOR-CON) SR tablet 40 mEq  40 mEq Oral BID PRN    ondansetron (ZOFRAN) injection 4 mg  4 mg IntraVENous Q4H PRN    famotidine (PEPCID) tablet 20 mg  20 mg Oral Q12H    scopolamine (TRANSDERM-SCOP) 1.5 mg  1.5 mg TransDERmal U16G    methyl salicylate-menthol (BENGAY) 15-10 % cream   Topical Q12H    amiodarone (CORDARONE) tablet 200 mg  200 mg Oral Q12H    metoprolol tartrate (LOPRESSOR) tablet 25 mg  25 mg Oral Q12H  traMADol (ULTRAM) tablet 50 mg  50 mg Oral Q6H PRN    HYDROmorphone (PF) (DILAUDID) injection 1 mg  1 mg IntraVENous Q4H PRN     Vitals:    04/22/17 2234 04/23/17 0247 04/23/17 0254 04/23/17 0659   BP: 130/69 136/78  126/65   Pulse: 86 90  87   Resp: 18 18  18   Temp: 98.1 °F (36.7 °C) 97.3 °F (36.3 °C)  98.8 °F (37.1 °C)   SpO2: 94% 95%  90%   Weight:   275 lb (124.7 kg)    Height:           Intake and Output:   Last 3 Shifts: 04/21 1901 - 04/23 0700  In: 80 [P.O.:410]  Out: 2681 [Urine:1656; Drains:510]  Current Shift:      Physical Exam:            GEN: well developed and in no acute distress,   HEENT:  PERRL, EOMI, no alar flaring or epistaxis, oral mucosa moist without cyanosis,   NECK:  no JVD, no retractions, no thyromegaly or masses,   LUNGS:  poor effort, clear  CHEST: R axilla mini incisions CDI, TPW removed  HEART:  RRR with no M,G,R;  ABDOMEN:  soft with no tenderness, positive bowel sounds present  EXTREMITIES:  warm with no cyanosis, mod edema  SKIN:  no jaundice or ecchymosis   NEURO:  alert and oriented, grossly non-focal    CHEST XRAY: Stable right basal atelectasis and pleural effusion. Removal of support structures. LAB:  Recent Labs      04/23/17   0505  04/22/17   0200  04/21/17 2200 04/21/17   1425   WBC  12.6*  11.7*   --    --   20.7*   HGB  11.4*  13.1*  13.9   < >  14.3   HCT  33.3*  36.7*  38.4*   < >  40.6*   PLT  124*  143*   --    --   159    < > = values in this interval not displayed. Recent Labs      04/23/17   0505  04/22/17   0200  04/21/17 2200 04/21/17   1425   NA   --   146*   --    --   148*   K  4.4  4.5  4.5   < >  4.1   CL   --   112*   --    --   111*   GLU   --   141*   --    --   121*   CO2   --   24   --    --   24   BUN   --   13   --    --   14   CREA   --   1.36   --    --   1.69*   MG  2.3  2.1  2.2   < >  2.8*   INR   --    --    --    --   1.1    < > = values in this interval not displayed.      Recent Labs      04/21/17   1915 04/21/17   1819 04/21/17   1424   PH  7.33*  7.28*  7.20*   PCO2  33*  36  50*   PO2  87  75  83   HCO3  17*  17*  19*       Assessment:     Principal Problem:    Mitral valve regurgitation (4/21/2017)      Overview: 4/21/17 (Dr Jennifer Lange) 1. Minimally invasive mitral valve repair with P2       resection and       32 mm Physio II annuloplasty ring. 2. Cryo intercostal nerve block for 5 segments. Active Problems:    Essential hypertension (3/7/2017)      Shortness of breath (3/7/2017)      BRITTNEY (obstructive sleep apnea) (3/7/2017)      GERD (gastroesophageal reflux disease) (3/7/2017)      Hypoxemia (4/22/2017)      Thyroid nodule (4/23/2017)      Overview: On US carotid 4/17        Plan:     POD 2 - Mini Mitral- TPW removed  R shoulder pain- acute on chronic (old) - adding NSAID, watch Cr  Limit Narcs- very sedated this AM  Encourage PO fluids and ambulation  NSR    Mamadou Stringer NP       5/9/2017     10:00 AM    I have personally seen and examined Jose Márquez with  KATELYNN Middleton. I agree and confirm findings in history, physical exam, and assessment/plan as outlined above, except as noted below.     Chitra Bryant MD

## 2017-04-23 NOTE — PROGRESS NOTES
Problem: Mobility Impaired (Adult and Pediatric)  Goal: *Acute Goals and Plan of Care (Insert Text)  LTG:  (1.)Mr. Jess Tejeda will move from supine to sit and sit to supine , scoot up and down and roll side to side in bed with INDEPENDENCE within 7 day(s). (2.)Mr. Jess Tejeda will transfer from bed to chair and chair to bed with INDEPENDENCE using the least restrictive device within 7 day(s). (3.)Mr. Blanton will ambulate with INDEPENDENCE for >or= 500 feet with the least restrictive device, appropriate gait pattern and good dynamic standing balance within 7 day(s). (4.)Mr. Jess Tejeda will perform B LE therapeutic exercises x 20 reps with INDEPENDENCE within 7 days to increase strength for improved safety and independence in transfers and gait. (5.)Mr. Jess Tejeda will ascend/descend 4 steps with 1 handrail(s) and INDEPENDENCE within 7 day(s) for safe entry/exit of home.  ________________________________________________________________________________________________      PHYSICAL THERAPY: Daily Note, Treatment Day: 1st and AM 4/23/2017  INPATIENT: Hospital Day: 3  Payor: Ridge Humphrey / Plan: [place]  / Product Type: PPO /      NAME/AGE/GENDER: Bishop Borges is a 50 y.o. male           PRIMARY DIAGNOSIS: Acute mitral insufficiency [I34.0] Mitral valve regurgitation Mitral valve regurgitation  Procedure(s) (LRB):  MITRAL VALVE REPAIR (MVR) MINIMALLY INVASIVE. CRYOABALATION OF INTERCOSTAL SPACES (N/A)  ESOPHAGEAL TRANS ECHOCARDIOGRAM (N/A)  2 Days Post-Op  ICD-10: Treatment Diagnosis:       · Generalized Muscle Weakness (M62.81)  · Difficulty in walking, Not elsewhere classified (R26.2)   Precaution/Allergies:  Amoxil [amoxicillin] and Codeine       ASSESSMENT:      Mr. Jess Tejeda presents sitting in bedside chair, extremely lethargic. Pt unable to complete a full sentence without falling back into sleep.  s/p MRV with continued complaints of extreme R UE pain secondary to positioning while on OR table. R UE currently in sling for comfort. Pt agreeable to treatment. Pt performed sit to stand with minimal assistance for support of R UE and cues for technique/safety. Pt ambulated 200 feet with CGA, constant cues to keep eyes open. Per RN's request, pt ambulated on room air and sats monitored throughout. Initially sats were 92-93% and as gait progressed, sats dropped to 87%. Sats increased with cues for pursed lip breathing. RN present and aware. Will attempt further ambulation next treatment. Educated pt on cardiac precautions and activity pacing skills. Will con't to follow. This section established at most recent assessment   PROBLEM LIST (Impairments causing functional limitations):  1. Decreased Strength  2. Decreased ADL/Functional Activities  3. Decreased Transfer Abilities  4. Decreased Ambulation Ability/Technique  5. Decreased Balance  6. Increased Pain  7. Decreased Activity Tolerance  8. Decreased Flexibility/Joint Mobility  9. Decreased Harlan with Home Exercise Program    INTERVENTIONS PLANNED: (Benefits and precautions of physical therapy have been discussed with the patient.)  1. Balance Exercise  2. Bed Mobility  3. Family Education  4. Gait Training  5. Home Exercise Program (HEP)  6. Therapeutic Activites  7. Therapeutic Exercise/Strengthening  8. Transfer Training  9. Group Therapy      TREATMENT PLAN: Frequency/Duration: twice daily for duration of hospital stay  Rehabilitation Potential For Stated Goals: GOOD      RECOMMENDED REHABILITATION/EQUIPMENT: (at time of discharge pending progress): Continue Skilled Therapy. HISTORY:   History of Present Injury/Illness (Reason for Referral):  Per chart: Mr. Nickolas Geiger is a very pleasant 17-year-old gentleman   who has had a several year history of worsening exertional   dyspnea. It became so profound that he presented to the   emergency room.  An echo was performed which showed severe mitral   regurgitation with flail P2 segment. Transesophageal echo   confirmed this. Left heart catheterization showed clean   coronaries. Past Medical History/Comorbidities:   Mr. Benny Kamara  has a past medical history of Acute mitral insufficiency; Difficult intubation; GERD (gastroesophageal reflux disease); History of kidney stones (1982); History of kidney stones; History of [place] spotted fever (1983); Hypertension; Mitral regurgitation; Obesity (BMI 30-39.9) (04/17/2017); Pancreatic cyst; PONV (postoperative nausea and vomiting); Sleep apnea; and Thyroid nodule. Mr. Benny Kamara  has a past surgical history that includes urological (1983); heent; sinus surgery proc unlisted (2002); lap cholecystectomy (2009); and ercp. Social History/Living Environment:   Home Environment: Private residence  # Steps to Enter: 3  Rails to Enter: Yes  Hand Rails : Bilateral  Wheelchair Ramp: No  One/Two Story Residence: Two story  # of Interior Steps: 12  Interior Rails: Left  Lift Chair Available: No  Living Alone: No  Support Systems: Spouse/Significant Other/Partner  Patient Expects to be Discharged to[de-identified] Private residence  Current DME Used/Available at Home: Blood pressure cuff  Tub or Shower Type: Shower  Prior Level of Function/Work/Activity:  Pt lives with wife. Works. Drives. No falls. Independent with all functional mobility and gait without assistive device. Independent with all ADLs.       Number of Personal Factors/Comorbidities that affect the Plan of Care: 1-2: MODERATE COMPLEXITY   EXAMINATION:   Most Recent Physical Functioning:   Gross Assessment:  AROM: Grossly decreased, non-functional (R shoulder secondary to surgical pain)  Strength: Generally decreased, functional  Coordination: Within functional limits  Sensation: Intact               Posture:  Posture (WDL): Exceptions to WDL  Posture Assessment: Asymmetry (comment) (R UE in sling)  Balance:  Sitting: Impaired  Sitting - Static: Good (unsupported)  Sitting - Dynamic: Fair (occasional)  Standing: Impaired  Standing - Static: Good  Standing - Dynamic : Fair Bed Mobility:  Rolling:  (bed mobility not tested)  Wheelchair Mobility:     Transfers:     Gait:     Base of Support: Widened  Speed/Tracey: Slow  Gait Abnormalities: Decreased step clearance;Trunk sway increased  Distance (ft): 200 Feet (ft)  Ambulation - Level of Assistance: Contact guard assistance       Body Structures Involved:  1. Heart  2. Joints  3. Muscles Body Functions Affected:  1. Cardio  2. Neuromusculoskeletal  3. Movement Related Activities and Participation Affected:  1. General Tasks and Demands  2. Mobility  3. Self Care  4. Domestic Life   Number of elements that affect the Plan of Care: 4+: HIGH COMPLEXITY   CLINICAL PRESENTATION:   Presentation: Evolving clinical presentation with changing clinical characteristics: MODERATE COMPLEXITY   CLINICAL DECISION MAKIN [facility] Inpatient Short Form  How much difficulty does the patient currently have. .. Unable A Lot A Little None   1. Turning over in bed (including adjusting bedclothes, sheets and blankets)? [ ] 1   [ ] 2   [X] 3   [ ] 4   2. Sitting down on and standing up from a chair with arms ( e.g., wheelchair, bedside commode, etc.)   [ ] 1   [ ] 2   [X] 3   [ ] 4   3. Moving from lying on back to sitting on the side of the bed? [ ] 1   [ ] 2   [X] 3   [ ] 4   How much help from another person does the patient currently need. .. Total A Lot A Little None   4. Moving to and from a bed to a chair (including a wheelchair)? [ ] 1   [ ] 2   [X] 3   [ ] 4   5. Need to walk in hospital room? [ ] 1   [ ] 2   [X] 3   [ ] 4   6. Climbing 3-5 steps with a railing? [ ] 1   [ ] 2   [X] 3   [ ] 4   © , Trustees of [address] [place] [address] [address] [address], under license to [employer].  All rights reserved    Score:  Initial: 18 Most Recent: X (Date: -- )     Interpretation of Tool:  Represents activities that are increasingly more difficult (i.e. Bed mobility, Transfers, Gait). Score 24 23 22-20 19-15 14-10 9-7 6       Modifier CH CI CJ CK CL CM CN         · Mobility - Walking and Moving Around:               - CURRENT STATUS:    CK - 40%-59% impaired, limited or restricted               - GOAL STATUS:           CK - 40%-59% impaired, limited or restricted               - D/C STATUS:                       ---------------To be determined---------------  Payor: BLUE CROSS / Plan: [place] BLUE CROSS [facility] / Product Type: PPO /       Medical Necessity:     · Patient demonstrates good rehab potential due to higher previous functional level. Reason for Services/Other Comments:  · Patient continues to require present interventions due to patient's inability to function at baseline. Use of outcome tool(s) and clinical judgement create a POC that gives a: Questionable prediction of patient's progress: MODERATE COMPLEXITY                 TREATMENT:   (In addition to Assessment/Re-Assessment sessions the following treatments were rendered)   Pre-treatment Symptoms/Complaints: \"My shouider is a little bit better. .\"  Pain: Initial:   Pain Intensity 1: 10  Pain Location 1: Shoulder  Pain Orientation 1: Right  Pain Intervention(s) 1: Ice, Ambulation/Increased Activity, Repositioned, Emotional support (ice applied by RN)  Post Session:  5/10      Therapeutic Activity: (    14 minutes): Therapeutic activities including Chair transfers, Ambulation on level ground and dynamic standing balance activites to improve mobility, strength, balance and coordination. Required minimal verbal cues   to promote safety and awareness of environment by opening eyes. Braces/Orthotics/Lines/Etc:   · O2 Device: Nasal cannula  Treatment/Session Assessment:    · Response to Treatment:  No treatment provided.   · Interdisciplinary Collaboration:  · Physical Therapist  · Registered Nurse  · After treatment position/precautions:  · Up in chair, Bed/Chair-wheels locked, Bed in low position, Call light within reach and RN notified  · Compliance with Program/Exercises: Will assess as treatment progresses. · Recommendations/Intent for next treatment session: \"Next visit will focus on advancements to more challenging activities and reduction in assistance provided\".   Total Treatment Duration:  PT Patient Time In/Time Out  Time In: 0826  Time Out: Leonor Diaz 81, PT

## 2017-04-23 NOTE — PROGRESS NOTES
Problem: Mobility Impaired (Adult and Pediatric)  Goal: *Acute Goals and Plan of Care (Insert Text)  LTG:  (1.)Mr. Satish Vega will move from supine to sit and sit to supine , scoot up and down and roll side to side in bed with INDEPENDENCE within 7 day(s). (2.)Mr. Satish Vega will transfer from bed to chair and chair to bed with INDEPENDENCE using the least restrictive device within 7 day(s). (3.)Mr. Blanton will ambulate with INDEPENDENCE for >or= 500 feet with the least restrictive device, appropriate gait pattern and good dynamic standing balance within 7 day(s). (4.)Mr. Satish Vega will perform B LE therapeutic exercises x 20 reps with INDEPENDENCE within 7 days to increase strength for improved safety and independence in transfers and gait. (5.)Mr. Satish Vega will ascend/descend 4 steps with 1 handrail(s) and INDEPENDENCE within 7 day(s) for safe entry/exit of home.  ________________________________________________________________________________________________      PHYSICAL THERAPY: Daily Note, Treatment Day: 1st and PM 4/23/2017  INPATIENT: Hospital Day: 3  Payor: BLUE CROSS / Plan: [id number] / Product Type: PPO /      NAME/AGE/GENDER: Mabel Gross is a 50 y.o. male           PRIMARY DIAGNOSIS: Acute mitral insufficiency [I34.0] Mitral valve regurgitation Mitral valve regurgitation  Procedure(s) (LRB):  MITRAL VALVE REPAIR (MVR) MINIMALLY INVASIVE. CRYOABALATION OF INTERCOSTAL SPACES (N/A)  ESOPHAGEAL TRANS ECHOCARDIOGRAM (N/A)  2 Days Post-Op  ICD-10: Treatment Diagnosis:       · Generalized Muscle Weakness (M62.81)  · Difficulty in walking, Not elsewhere classified (R26.2)   Precaution/Allergies:  Amoxil [amoxicillin] and Codeine       ASSESSMENT:      Mr. Satish Vega presents supine, remains lethargic. Pt unable to complete a full sentence without falling back into sleep. s/p MRV with continued complaints of extreme R UE pain secondary to positioning while on OR table.  Recommend OT consult to address R shoulder if ok with MD. R UE reamins in sling for comfort. Pt agreeable to treatment. Pt performed supine to sit and sit to stand with minimal assistance for support of R UE and cues for technique/safety. Assisted pt to bathroom per his request and then pt ambulated 250 feet with HHA x 1, constant cues to keep eyes open. Slow lg, significantly decreased step length and decreased knee flexion in swing with increased trunk sway. Sats 97% on room air during gait with cues for pursed lip breathing. Educated pt on cardiac precautions and activity pacing skills. Pt needs a lot of encouragement. Will con't to follow. This section established at most recent assessment   PROBLEM LIST (Impairments causing functional limitations):  1. Decreased Strength  2. Decreased ADL/Functional Activities  3. Decreased Transfer Abilities  4. Decreased Ambulation Ability/Technique  5. Decreased Balance  6. Increased Pain  7. Decreased Activity Tolerance  8. Decreased Flexibility/Joint Mobility  9. Decreased [place] with Home Exercise Program    INTERVENTIONS PLANNED: (Benefits and precautions of physical therapy have been discussed with the patient.)  1. Balance Exercise  2. Bed Mobility  3. Family Education  4. Gait Training  5. Home Exercise Program (HEP)  6. Therapeutic Activites  7. Therapeutic Exercise/Strengthening  8. Transfer Training  9. Group Therapy      TREATMENT PLAN: Frequency/Duration: twice daily for duration of hospital stay  Rehabilitation Potential For Stated Goals: GOOD      RECOMMENDED REHABILITATION/EQUIPMENT: (at time of discharge pending progress): Continue Skilled Therapy. HISTORY:   History of Present Injury/Illness (Reason for Referral):  Per chart: Mr. Zoya Morton is a very pleasant 45-year-old gentleman   who has had a several year history of worsening exertional   dyspnea. It became so profound that he presented to the   emergency room.  An echo was performed which showed severe mitral   regurgitation with flail P2 segment. Transesophageal echo   confirmed this. Left heart catheterization showed clean   coronaries. Past Medical History/Comorbidities:   Mr. Margret Knott  has a past medical history of Acute mitral insufficiency; Difficult intubation; GERD (gastroesophageal reflux disease); History of kidney stones (1982); History of kidney stones; History of [place] spotted fever (1983); Hypertension; Mitral regurgitation; Obesity (BMI 30-39.9) (04/17/2017); Pancreatic cyst; PONV (postoperative nausea and vomiting); Sleep apnea; and Thyroid nodule. Mr. Margret Knott  has a past surgical history that includes urological (1983); heent; sinus surgery proc unlisted (2002); lap cholecystectomy (2009); and ercp. Social History/Living Environment:   Home Environment: Private residence  # Steps to Enter: 3  Rails to Enter: Yes  Hand Rails : Bilateral  Wheelchair Ramp: No  One/Two Story Residence: Two story  # of Interior Steps: 12  Interior Rails: Left  Lift Chair Available: No  Living Alone: No  Support Systems: Spouse/Significant Other/Partner  Patient Expects to be Discharged to[de-identified] Private residence  Current DME Used/Available at Home: Blood pressure cuff  Tub or Shower Type: Shower  Prior Level of Function/Work/Activity:  Pt lives with wife. Works. Drives. No falls. Independent with all functional mobility and gait without assistive device. Independent with all ADLs.       Number of Personal Factors/Comorbidities that affect the Plan of Care: 1-2: MODERATE COMPLEXITY   EXAMINATION:   Most Recent Physical Functioning:   Gross Assessment:  AROM: Grossly decreased, non-functional (R shoulder secondary to surgical pain)  Strength: Generally decreased, functional  Coordination: Within functional limits  Sensation: Intact               Posture:  Posture (WDL): Exceptions to WDL  Posture Assessment: Asymmetry (comment) (R UE in sling)  Balance:  Sitting: Impaired  Sitting - Static: Good (unsupported)  Sitting - Dynamic: Fair (occasional)  Standing: Impaired  Standing - Static: Good  Standing - Dynamic : Fair Bed Mobility:  Rolling:  (bed mobility not tested)  Supine to Sit: Minimum assistance  Wheelchair Mobility:     Transfers:     Gait:     Base of Support: Widened  Speed/Tracey: Slow  Gait Abnormalities: Decreased step clearance;Trunk sway increased  Distance (ft): 250 Feet (ft)  Ambulation - Level of Assistance: Contact guard assistance       Body Structures Involved:  1. Heart  2. Joints  3. Muscles Body Functions Affected:  1. Cardio  2. Neuromusculoskeletal  3. Movement Related Activities and Participation Affected:  1. General Tasks and Demands  2. Mobility  3. Self Care  4. Domestic Life   Number of elements that affect the Plan of Care: 4+: HIGH COMPLEXITY   CLINICAL PRESENTATION:   Presentation: Evolving clinical presentation with changing clinical characteristics: MODERATE COMPLEXITY   CLINICAL DECISION MAKIN [facility] Short Form  How much difficulty does the patient currently have. .. Unable A Lot A Little None   1. Turning over in bed (including adjusting bedclothes, sheets and blankets)? [ ] 1   [ ] 2   [X] 3   [ ] 4   2. Sitting down on and standing up from a chair with arms ( e.g., wheelchair, bedside commode, etc.)   [ ] 1   [ ] 2   [X] 3   [ ] 4   3. Moving from lying on back to sitting on the side of the bed? [ ] 1   [ ] 2   [X] 3   [ ] 4   How much help from another person does the patient currently need. .. Total A Lot A Little None   4. Moving to and from a bed to a chair (including a wheelchair)? [ ] 1   [ ] 2   [X] 3   [ ] 4   5. Need to walk in hospital room? [ ] 1   [ ] 2   [X] 3   [ ] 4   6. Climbing 3-5 steps with a railing? [ ] 1   [ ] 2   [X] 3   [ ] 4   © , Trustees of [address] [place] [address] [address] [address], under license to [employer].  All rights reserved    Score:  Initial: 18 Most Recent: X (Date: -- )     Interpretation of Tool:  Represents activities that are increasingly more difficult (i.e. Bed mobility, Transfers, Gait). Score 24 23 22-20 19-15 14-10 9-7 6       Modifier CH CI CJ CK CL CM CN         · Mobility - Walking and Moving Around:               - CURRENT STATUS:    CK - 40%-59% impaired, limited or restricted               - GOAL STATUS:           CK - 40%-59% impaired, limited or restricted               - D/C STATUS:                       ---------------To be determined---------------  Payor: BLUE CROSS / Plan: [place] BLUE CROSS [facility] / Product Type: PPO /       Medical Necessity:     · Patient demonstrates good rehab potential due to higher previous functional level. Reason for Services/Other Comments:  · Patient continues to require present interventions due to patient's inability to function at baseline. Use of outcome tool(s) and clinical judgement create a POC that gives a: Questionable prediction of patient's progress: MODERATE COMPLEXITY                 TREATMENT:   (In addition to Assessment/Re-Assessment sessions the following treatments were rendered)   Pre-treatment Symptoms/Complaints: \"My shouider is a little bit better. .\"  Pain: Initial:   Pain Intensity 1: 6  Pain Location 1: Shoulder  Pain Orientation 1: Right  Pain Intervention(s) 1: Ambulation/Increased Activity, Family Support, Repositioned, Emotional support  Post Session:  6/10 - RN medicated. Therapeutic Activity: (    20 minutes): Therapeutic activities including Chair transfers, Ambulation on level ground, toileting and dynamic standing balance activites to improve mobility, strength, balance and coordination. Required minimal verbal cues   to promote safety and awareness of environment by opening eyes and to promote deep breathing. Cues provided to increase step length in gait.        Braces/Orthotics/Lines/Etc:   · O2 Device: Nasal cannula  Treatment/Session Assessment: · Response to Treatment:  No treatment provided. · Interdisciplinary Collaboration:  · Physical Therapist  · Registered Nurse  · After treatment position/precautions:  · Supine in bed, Bed/Chair-wheels locked, Bed in low position, Call light within reach, RN notified and Family at bedside  · Compliance with Program/Exercises: Will assess as treatment progresses. · Recommendations/Intent for next treatment session: \"Next visit will focus on advancements to more challenging activities and reduction in assistance provided\".   Total Treatment Duration:  PT Patient Time In/Time Out  Time In: 1438  Time Out: 1007 ROSELIA Craven

## 2017-04-23 NOTE — PROGRESS NOTES
Bedside and Verbal shift change report given to Keren Kern RN (oncoming nurse) by Jeronimo Simons RN (offgoing nurse).

## 2017-04-23 NOTE — PROGRESS NOTES
Bedside and Verbal shift change report given to Vivek Dumas RN (oncoming nurse) by Aminata Isaacs RN (offgoing nurse).

## 2017-04-24 ENCOUNTER — APPOINTMENT (OUTPATIENT)
Dept: GENERAL RADIOLOGY | Age: 52
DRG: 221 | End: 2017-04-24
Attending: THORACIC SURGERY (CARDIOTHORACIC VASCULAR SURGERY)
Payer: COMMERCIAL

## 2017-04-24 ENCOUNTER — HOME HEALTH ADMISSION (OUTPATIENT)
Dept: HOME HEALTH SERVICES | Facility: HOME HEALTH | Age: 52
End: 2017-04-24

## 2017-04-24 PROBLEM — M25.511 RIGHT SHOULDER PAIN: Status: ACTIVE | Noted: 2017-04-24

## 2017-04-24 LAB
ABO + RH BLD: NORMAL
ANION GAP BLD CALC-SCNC: 4 MMOL/L (ref 7–16)
BLD PROD TYP BPU: NORMAL
BLOOD GROUP ANTIBODIES SERPL: NORMAL
BPU ID: NORMAL
BUN SERPL-MCNC: 19 MG/DL (ref 6–23)
CALCIUM SERPL-MCNC: 7.8 MG/DL (ref 8.3–10.4)
CHLORIDE SERPL-SCNC: 101 MMOL/L (ref 98–107)
CO2 SERPL-SCNC: 35 MMOL/L (ref 21–32)
CREAT SERPL-MCNC: 1.22 MG/DL (ref 0.8–1.5)
CROSSMATCH RESULT,%XM: NORMAL
GLUCOSE BLD STRIP.AUTO-MCNC: 90 MG/DL (ref 65–100)
GLUCOSE SERPL-MCNC: 82 MG/DL (ref 65–100)
MAGNESIUM SERPL-MCNC: 2.1 MG/DL (ref 1.8–2.4)
MM INDURATION POC: 0 MM (ref 0–5)
POTASSIUM SERPL-SCNC: 3.6 MMOL/L (ref 3.5–5.1)
PPD POC: NEGATIVE NEGATIVE
SODIUM SERPL-SCNC: 140 MMOL/L (ref 136–145)
SPECIMEN EXP DATE BLD: NORMAL
STATUS OF UNIT,%ST: NORMAL
UNIT DIVISION, %UDIV: 0

## 2017-04-24 PROCEDURE — 65660000004 HC RM CVT STEPDOWN

## 2017-04-24 PROCEDURE — 97110 THERAPEUTIC EXERCISES: CPT

## 2017-04-24 PROCEDURE — 80048 BASIC METABOLIC PNL TOTAL CA: CPT | Performed by: NURSE PRACTITIONER

## 2017-04-24 PROCEDURE — 97166 OT EVAL MOD COMPLEX 45 MIN: CPT

## 2017-04-24 PROCEDURE — 74011250636 HC RX REV CODE- 250/636: Performed by: THORACIC SURGERY (CARDIOTHORACIC VASCULAR SURGERY)

## 2017-04-24 PROCEDURE — 36415 COLL VENOUS BLD VENIPUNCTURE: CPT | Performed by: THORACIC SURGERY (CARDIOTHORACIC VASCULAR SURGERY)

## 2017-04-24 PROCEDURE — 71020 XR CHEST PA LAT: CPT

## 2017-04-24 PROCEDURE — 74011250637 HC RX REV CODE- 250/637: Performed by: THORACIC SURGERY (CARDIOTHORACIC VASCULAR SURGERY)

## 2017-04-24 PROCEDURE — 74011250637 HC RX REV CODE- 250/637: Performed by: NURSE PRACTITIONER

## 2017-04-24 PROCEDURE — 83735 ASSAY OF MAGNESIUM: CPT | Performed by: THORACIC SURGERY (CARDIOTHORACIC VASCULAR SURGERY)

## 2017-04-24 PROCEDURE — 97530 THERAPEUTIC ACTIVITIES: CPT

## 2017-04-24 PROCEDURE — 82962 GLUCOSE BLOOD TEST: CPT

## 2017-04-24 PROCEDURE — 99232 SBSQ HOSP IP/OBS MODERATE 35: CPT | Performed by: INTERNAL MEDICINE

## 2017-04-24 PROCEDURE — 74011250637 HC RX REV CODE- 250/637: Performed by: INTERNAL MEDICINE

## 2017-04-24 RX ORDER — GUAIFENESIN 600 MG/1
1200 TABLET, EXTENDED RELEASE ORAL EVERY 12 HOURS
Status: DISCONTINUED | OUTPATIENT
Start: 2017-04-24 | End: 2017-04-25 | Stop reason: HOSPADM

## 2017-04-24 RX ORDER — IBUPROFEN 400 MG/1
800 TABLET ORAL 4 TIMES DAILY
Status: DISCONTINUED | OUTPATIENT
Start: 2017-04-24 | End: 2017-04-25 | Stop reason: HOSPADM

## 2017-04-24 RX ORDER — TRAMADOL HYDROCHLORIDE 50 MG/1
100 TABLET ORAL
Status: DISCONTINUED | OUTPATIENT
Start: 2017-04-24 | End: 2017-04-25 | Stop reason: HOSPADM

## 2017-04-24 RX ORDER — MUPIROCIN 20 MG/G
OINTMENT TOPICAL EVERY 12 HOURS
Status: DISCONTINUED | OUTPATIENT
Start: 2017-04-24 | End: 2017-04-25 | Stop reason: HOSPADM

## 2017-04-24 RX ADMIN — AMIODARONE HYDROCHLORIDE 200 MG: 200 TABLET ORAL at 21:08

## 2017-04-24 RX ADMIN — Medication 10 ML: at 12:27

## 2017-04-24 RX ADMIN — TRAMADOL HYDROCHLORIDE 50 MG: 50 TABLET, FILM COATED ORAL at 00:49

## 2017-04-24 RX ADMIN — ONDANSETRON 4 MG: 2 INJECTION INTRAMUSCULAR; INTRAVENOUS at 18:24

## 2017-04-24 RX ADMIN — IBUPROFEN 400 MG: 400 TABLET, FILM COATED ORAL at 06:08

## 2017-04-24 RX ADMIN — ASPIRIN 81 MG: 81 TABLET, COATED ORAL at 08:30

## 2017-04-24 RX ADMIN — METOPROLOL TARTRATE 25 MG: 25 TABLET ORAL at 21:09

## 2017-04-24 RX ADMIN — FAMOTIDINE 20 MG: 20 TABLET ORAL at 21:09

## 2017-04-24 RX ADMIN — METOPROLOL TARTRATE 25 MG: 25 TABLET ORAL at 08:31

## 2017-04-24 RX ADMIN — FAMOTIDINE 20 MG: 20 TABLET ORAL at 08:29

## 2017-04-24 RX ADMIN — TRAMADOL HYDROCHLORIDE 100 MG: 50 TABLET, FILM COATED ORAL at 16:51

## 2017-04-24 RX ADMIN — TAPENTADOL HYDROCHLORIDE 100 MG: 50 TABLET, FILM COATED ORAL at 23:30

## 2017-04-24 RX ADMIN — POTASSIUM CHLORIDE 10 MEQ: 10 TABLET, EXTENDED RELEASE ORAL at 08:29

## 2017-04-24 RX ADMIN — Medication 10 ML: at 21:09

## 2017-04-24 RX ADMIN — MUPIROCIN: 20 OINTMENT TOPICAL at 08:33

## 2017-04-24 RX ADMIN — SIMETHICONE CHEW TAB 80 MG 80 MG: 80 TABLET ORAL at 18:24

## 2017-04-24 RX ADMIN — ACYCLOVIR 200 MG: 200 CAPSULE ORAL at 08:56

## 2017-04-24 RX ADMIN — IBUPROFEN 800 MG: 400 TABLET, FILM COATED ORAL at 11:59

## 2017-04-24 RX ADMIN — Medication: at 21:12

## 2017-04-24 RX ADMIN — POTASSIUM CHLORIDE 20 MEQ: 20 TABLET, EXTENDED RELEASE ORAL at 08:29

## 2017-04-24 RX ADMIN — FUROSEMIDE 20 MG: 20 TABLET ORAL at 08:31

## 2017-04-24 RX ADMIN — GUAIFENESIN 1200 MG: 600 TABLET, EXTENDED RELEASE ORAL at 08:31

## 2017-04-24 RX ADMIN — IBUPROFEN 800 MG: 400 TABLET, FILM COATED ORAL at 21:09

## 2017-04-24 RX ADMIN — Medication 10 ML: at 06:08

## 2017-04-24 RX ADMIN — AMIODARONE HYDROCHLORIDE 200 MG: 200 TABLET ORAL at 08:30

## 2017-04-24 RX ADMIN — Medication: at 08:38

## 2017-04-24 RX ADMIN — IBUPROFEN 800 MG: 400 TABLET, FILM COATED ORAL at 16:51

## 2017-04-24 RX ADMIN — GUAIFENESIN 1200 MG: 600 TABLET, EXTENDED RELEASE ORAL at 21:09

## 2017-04-24 RX ADMIN — MUPIROCIN: 20 OINTMENT TOPICAL at 21:12

## 2017-04-24 RX ADMIN — OXYCODONE HYDROCHLORIDE AND ACETAMINOPHEN 1 TABLET: 5; 325 TABLET ORAL at 02:20

## 2017-04-24 RX ADMIN — TRAMADOL HYDROCHLORIDE 50 MG: 50 TABLET, FILM COATED ORAL at 08:32

## 2017-04-24 NOTE — PROGRESS NOTES
Shanon Clemens  Admission Date: 4/21/2017             Daily Progress Note: 4/24/2017    The patient's chart is reviewed and the patient is discussed with the staff. 49 yo male with a history of BRITTNEY, HTN, GERD, prior history of herpes infection of left eye, BRITTNEY, and MRCompa Presented to the ER at [facility] with tingling in left side of face and down left arm. Imaging negative for CVA and pt felt to have TIA. Pt had echo which revealed EF 55-60%, no WMA, MV with moderate prolapse involving the posterior leaflet with some nodular appearance of of the posterior leaflet and MARCUS recommended to exclude vegetation. He had MARCUS which revealed no vegetation but did reveal significant posterior leaflet on MV regurgitation. He had LHC with normal coronaries and severe mitral regurgitation with flail P2. He was referred to Dr. Jie Izquierdo and felt appropriate for MV replacement. Pt was admitted for elective mini MVR which was completed on 4/21/17 per Dr. Jie Izquierdo. Post-op course complicated by ongoing shoulder pain.      Subjective:     Pt sitting up in chair on 2L O2-sat 97%. Pt admits to cough with blood tinged sputum. His main complaint is R shoulder pain. He cannot raise R arm or shoulder. He has not had BM. He is not eating well. He had sleep study 3 years ago at [facility] but never got CPAP. Reports that he tried to wear CPAP 5 minutes and couldn't tolerate it. From ENT notes: He has a history of severe sleep apnea with an AHI of 30.5 and an RDI of 32.5.     Current Facility-Administered Medications   Medication Dose Route Frequency    senna-docusate (PERICOLACE) 8.6-50 mg per tablet 2 Tab  2 Tab Oral BID    ibuprofen (MOTRIN) tablet 400 mg  400 mg Oral QID    oxyCODONE-acetaminophen (PERCOCET) 5-325 mg per tablet 1 Tab  1 Tab Oral Q4H PRN    guaiFENesin ER (MUCINEX) tablet 1,200 mg  1,200 mg Oral BID    bisacodyl (DULCOLAX) tablet 10 mg  10 mg Oral DAILY PRN    simethicone (MYLICON) tablet 80 mg  80 mg Oral QID PRN    acyclovir (ZOVIRAX) capsule 200 mg  200 mg Oral DAILY    sodium chloride (NS) flush 5-10 mL  5-10 mL IntraVENous Q8H    sodium chloride (NS) flush 5-10 mL  5-10 mL IntraVENous PRN    furosemide (LASIX) tablet 20 mg  20 mg Oral DAILY    alum-mag hydroxide-simeth (MYLANTA) oral suspension 30 mL  30 mL Oral Q4H PRN    acetaminophen (TYLENOL) tablet 650 mg  650 mg Oral Q4H PRN    zolpidem (AMBIEN) tablet 5 mg  5 mg Oral QHS PRN    mupirocin (BACTROBAN) 2 % ointment   Both Nostrils BID    aspirin delayed-release tablet 81 mg  81 mg Oral DAILY    magnesium oxide (MAG-OX) tablet 400 mg  400 mg Oral TID PRN    magnesium oxide (MAG-OX) tablet 400 mg  400 mg Oral QID PRN    potassium chloride (K-DUR, KLOR-CON) tablet 10 mEq  10 mEq Oral DAILY    potassium chloride (K-DUR, KLOR-CON) SR tablet 20 mEq  20 mEq Oral BID PRN    potassium chloride (K-DUR, KLOR-CON) SR tablet 40 mEq  40 mEq Oral BID PRN    ondansetron (ZOFRAN) injection 4 mg  4 mg IntraVENous Q4H PRN    famotidine (PEPCID) tablet 20 mg  20 mg Oral Q12H    scopolamine (TRANSDERM-SCOP) 1.5 mg  1.5 mg TransDERmal M19Y    methyl salicylate-menthol (BENGAY) 15-10 % cream   Topical Q12H    amiodarone (CORDARONE) tablet 200 mg  200 mg Oral Q12H    metoprolol tartrate (LOPRESSOR) tablet 25 mg  25 mg Oral Q12H    traMADol (ULTRAM) tablet 50 mg  50 mg Oral Q6H PRN    HYDROmorphone (PF) (DILAUDID) injection 1 mg  1 mg IntraVENous Q4H PRN       Review of Systems  +R shoulder pain   +cough  Constitutional: negative for fever, chills, sweats  Cardiovascular: negative for chest pain, palpitations, syncope, edema  Gastrointestinal:  negative for dysphagia, reflux, vomiting, diarrhea, abdominal pain, or melena  Neurologic:  negative for focal weakness, numbness, headache    Objective:     Vitals:    04/23/17 1853 04/23/17 2230 04/24/17 0242 04/24/17 0700   BP: 134/64 136/80 127/70 114/61   Pulse: 69 64 74 66   Resp: 16 16 16 18   Temp: 97.8 °F (36.6 °C) 97.4 °F (36.3 °C) 97.7 °F (36.5 °C) 97.6 °F (36.4 °C)   SpO2: 93% 95% 97% 94%   Weight:   276 lb 4.8 oz (125.3 kg)    Height:         Intake and Output:   04/22 1901 - 04/24 0700  In: 1560 [P.O.:1560]  Out: 400 [Urine:400]       Physical Exam:   Constitution:  the patient is well developed and in no acute distress,on 2L O2  EENMT:  Sclera clear, pupils equal, oral mucosa moist  Respiratory: very diminished and distant  Cardiovascular:  RRR without M,G,R  Gastrointestinal: soft and non-tender; with positive bowel sounds. Musculoskeletal: warm without cyanosis. There is no lower leg edema. Skin:  no jaundice or rashes,incision under R breast/R flank clean, dry, intact   Neurologic: no gross neuro deficits     Psychiatric:  Awake, Oriented x 3, closes eyes toward end of exam    CHEST XRAY:           LAB  Recent Labs      04/23/17   0648  04/22/17 2014 04/22/17   1608  04/22/17   0928  04/22/17   0700   GLUCPOC  121*  117*  128*  116*  112*      Recent Labs      04/23/17   0505  04/22/17   0200  04/21/17   2200  04/21/17   1800  04/21/17   1425   WBC  12.6*  11.7*   --    --   20.7*   HGB  11.4*  13.1*  13.9  14.1  14.3   HCT  33.3*  36.7*  38.4*  39.4*  40.6*   PLT  124*  143*   --    --   159   INR   --    --    --    --   1.1     Recent Labs      04/24/17   0440  04/23/17   0505  04/22/17   0200   NA  140  143  146*   K  3.6  4.4  4.5   CL  101  104  112*   CO2  35*  32  24   GLU  82  109*  141*   BUN  19  13  13   CREA  1.22  1.27  1.36   MG  2.1  2.3  2.1   CA  7.8*  8.4  7.6*     Recent Labs      04/21/17   1915  04/21/17   1810  04/21/17   1424   PH  7.33*  7.28*  7.20*   PCO2  33*  36  50*   PO2  87  75  83   HCO3  17*  17*  19*     No results for input(s): LCAD, LAC in the last 72 hours.       Assessment:  (Medical Decision Making)     Hospital Problems  Date Reviewed: 4/23/2017          Codes Class Noted POA    Thyroid nodule (Chronic)-chronic ICD-10-CM: E04.1  ICD-9-CM: 241.0  4/23/2017 Yes Overview Signed 4/23/2017  8:45 AM by Ronak Grewal NP     On US carotid 4/17             Hypoxemia-wean O2 as tolerated  ICD-10-CM: R09.02  ICD-9-CM: 799.02  4/22/2017 Unknown        * (Principal)Mitral valve regurgitation-per primary ICD-10-CM: I34.0  ICD-9-CM: 424.0  4/21/2017 Yes    Overview Signed 4/22/2017  8:31 AM by Ronak Grewal NP     4/21/17 (Dr Tonny Manley) 1. Minimally invasive mitral valve repair with P2 resection and   32 mm Physio II annuloplasty ring. 2. Cryo intercostal nerve block for 5 segments. Essential hypertension (Chronic)-controlled  ICD-10-CM: I10  ICD-9-CM: 401.9  3/7/2017 Yes        Shortness of breath-chronic  ICD-10-CM: R06.02  ICD-9-CM: 786.05  3/7/2017 Yes        BRITTNEY (obstructive sleep apnea) (Chronic)-noncompliant at home,needs to get on CPAP ICD-10-CM: G47.33  ICD-9-CM: 327.23  3/7/2017 Yes        GERD (gastroesophageal reflux disease) (Chronic)-chronic  ICD-10-CM: K21.9  ICD-9-CM: 530.81  3/7/2017 Yes              Plan:  (Medical Decision Making)     --wean O2 as tolerated, put him on RA-RN aware and will recheck  --continue PT/OT to start today  --continue IS/OOB  --may need ultrasound evaluation  --needs to get on CPAP    More than 50% of the time documented was spent in face-to-face contact with the patient and in the care of the patient on the floor/unit where the patient is located. DAVID Henry    Lungs:  Decreased BS in the bases  Heart:  RRR with no Murmur/Rubs/Gallops    Additional Comments:  Need to control shoulder pain. Will increase Motrin and ask PT/OT to assist. Likely need heat therapy. Will add IPPB as well. Will follow BMP closely and have H2 blocker on board. Updated his nurse and surgery. I have spoken with and examined the patient. I agree with the above assessment and plan as documented.     Page President, MD

## 2017-04-24 NOTE — PROGRESS NOTES
Problem: Self Care Deficits Care Plan (Adult)  Goal: *Acute Goals and Plan of Care (Insert Text)  1. Marilee Vinson will complete gentle UE home program independently to reduce stiffness/pain and increase independence with ADL. 2. Marilee Vinson will complete upper body dressing with modified independence. 3. Marilee Vinson will complete self-feeding/grooming/oral care using RUE and LUE with modified independence. Time frame: 4 - 7 visits      OCCUPATIONAL THERAPY: Initial Assessment and Treatment Day: 1st 4/24/2017  INPATIENT: Hospital Day: 4  Payor: Navjot Lyon / [place]: [place] [employer] [place] / Product Type: PPO /      NAME/AGE/GENDER: Marilee Vinson is a 50 y.o. male           PRIMARY DIAGNOSIS:  Acute mitral insufficiency [I34.0] Mitral valve regurgitation Mitral valve regurgitation  Procedure(s) (LRB):  MITRAL VALVE REPAIR (MVR) MINIMALLY INVASIVE. CRYOABALATION OF INTERCOSTAL SPACES (N/A)  ESOPHAGEAL TRANS ECHOCARDIOGRAM (N/A)  3 Days Post-Op  ICD-10: Treatment Diagnosis:        · Pain in Right Shoulder (M25.511)   Precautions/Allergies:         Amoxil [amoxicillin] and Codeine       ASSESSMENT:      Mr. Henry Gay presents s/p mini- MVR with R shoulder pain and inability to elevate his shoulder without pain/guarding. Patient reports his shoulder has been bothering him since surgery, though he does report having to place heat on it in the past.  His infraspinatus muscle belly is especially painful to palpation and external rotation/shoulder scaption is difficult secondary to weakness/pain/guarding. He was given ice pack then heat pack stating it helped. He was also given gentle UE exercise home program (handout given and in physical chart) which he demonstrated. Recommended he follow up with orthopedist if shoulder continues to bother him after discharge (patient's wife works for [employer]) with patient/wife verbalizing understanding.   Marilee Vinson presents with the following problems and would benefit from occupational therapy to maximize independence with self-care,instrumental activities of daily living, and functional transfers/mobility for activities of daily living/instrumental activities of daily living via the stated goals. This section established at most recent assessment   PROBLEM LIST (Impairments causing functional limitations):  1. Decreased Strength  2. Decreased ADL/Functional Activities  3. Decreased Transfer Abilities  4. Decreased Balance  5. Increased Pain  6. Decreased Activity Tolerance  7. Decreased Flexibility/Joint Mobility  8. Decreased [place] with Home Exercise Program    INTERVENTIONS PLANNED: (Benefits and precautions of occupational therapy have been discussed with the patient.)  1. Activities of daily living training  2. Theraputic activity  3. Theraputic exercise      TREATMENT PLAN: Frequency/Duration: Follow patient 3 times/week to address above goals. Rehabilitation Potential For Stated Goals: GOOD      RECOMMENDED REHABILITATION/EQUIPMENT: (at time of discharge pending progress): Continue Skilled Therapy. OCCUPATIONAL PROFILE AND HISTORY:   History of Present Injury/Illness (Reason for Referral):  Per Pulmonologist: Patient is seen at the request of Dr. Alda Clifford for respiratory management status post cardiac surgery. Pt has a history of HTN, BRITTNEY(never got CPAP), GERD, and prior history of herpes infection of left eye noted to have heart murmur about 2 years ago. Pt presented to the ER at [facility] with tingling in left side of face and down left arm. Imaging negative for CVA and pt felt to have TIA. Pt had echo which revealed EF 55-60%, no WMA, MV with moderate prolapse involving the posterior leaflet with some nodular appearance of of the posterior leaflet and MARCUS recommended to exclude vegetation. He had MARCUS which revealed no vegetation but did reveal significant posterior leaflet on MV regurgitation.  He had [facility] with normal coronaries and severe mitral regurgitation with flail P2. He was referred to Dr. Jie Izquierdo and felt appropriate for MV replacement. Pt admitted today for elective mini MVR. Currently is sedated in CV-ICU and orally intubated receiving mechanical ventilation. Past Medical History/Comorbidities:   Mr. Rony Sherwood  has a past medical history of Acute mitral insufficiency; Difficult intubation; GERD (gastroesophageal reflux disease); History of kidney stones (1982); History of kidney stones; History of Jean Mountain spotted fever (1983); Hypertension; Mitral regurgitation; Obesity (BMI 30-39.9) (04/17/2017); Pancreatic cyst; PONV (postoperative nausea and vomiting); Right shoulder pain (4/24/2017); Sleep apnea; and Thyroid nodule. Mr. Rony Sherwood  has a past surgical history that includes urological (1983); heent; sinus surgery proc unlisted (2002); lap cholecystectomy (2009); and ercp. Social History/Living Environment: Works as . Home Environment: Private residence  # Steps to Enter: 3  Rails to Enter: Yes  Hand Rails : Bilateral  Wheelchair Ramp: No  One/Two Story Residence: Two story  # of Interior Steps: 12  Interior Rails: Left  Lift Chair Available: No  Living Alone: No  Support Systems: Spouse/Significant Other/Partner  Patient Expects to be Discharged to[de-identified] Private residence  Current DME Used/Available at Home: Blood pressure cuff  Tub or Shower Type: Shower  Prior Level of Function/Work/Activity:  Independent with all. 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   Dominant Side:         RIGHT   Number of Personal Factors/Comorbidities that affect the Plan of Care: Expanded review of therapy/medical records (1-2):  MODERATE COMPLEXITY   ASSESSMENT OF OCCUPATIONAL PERFORMANCE[de-identified]   Activities of Daily Living:           Basic ADLs (From Assessment) Complex ADLs (From Assessment)   Basic ADL  Feeding: Stand-by assistance  Oral Facial Hygiene/Grooming: Stand-by assistance  Bathing: Stand-by assistance  Upper Body Dressing: Stand-by assistance  Lower Body Dressing: Stand-by assistance  Toileting: Stand by assistance     Grooming/Bathing/Dressing Activities of Daily Living     Cognitive Retraining  Safety/Judgement: Awareness of environment                       Bed/Mat Mobility  Sit to Supine: Supervision  Sit to Stand: Independent          Most Recent Physical Functioning:   Gross Assessment:  AROM: Generally decreased, functional (R shoulder elevation/ER limited/elbow flexion due to pain)  Strength: Generally decreased, functional (R shoulder 2+/5 (gaurding); elbow: 4-/5; wrist/hand: 4+/5)               Posture:  Posture (WDL): Exceptions to WDL  Posture Assessment: Asymmetry (comment) (R UE in sling)  Balance:  Sitting: Intact  Sitting - Static: Good (unsupported)  Sitting - Dynamic: Good (unsupported)  Standing - Static: Good  Standing - Dynamic : Good Bed Mobility:  Sit to Supine: Supervision  Wheelchair Mobility:     Transfers:  Sit to Stand: Independent  Stand to Sit: Independent                 Patient Vitals for the past 6 hrs:       BP BP Patient Position SpO2 O2 Flow Rate (L/min) Pulse   04/24/17 1100 105/56 Sitting 95 % 2 l/min 62        Mental Status  Neurologic State: Alert  Orientation Level: Oriented to person, Oriented to place, Oriented to situation  Cognition: Follows commands  Perception: Appears intact  Perseveration: No perseveration noted  Safety/Judgement: Awareness of environment                               Physical Skills Involved:  1. Range of Motion  2. Balance  3. Mobility  4. Strength  5. Activity tolerance Cognitive Skills Affected (resulting in the inability to perform in a timely and safe manner):  1. None noted Psychosocial Skills Affected:  1.  Routines and Behaviors   Number of elements that affect the Plan of Care: 3-5:  MODERATE COMPLEXITY   CLINICAL DECISION MAKING:   [facility] In[facility] Short Form  How much help from another person does the patient currently need. .. Total A Lot A Little None   1. Putting on and taking off regular lower body clothing?   [ ] 1   [ ] 2   [X] 3   [ ] 4   2. Bathing (including washing, rinsing, drying)? [ ] 1   [ ] 2   [X] 3   [ ] 4   3. Toileting, which includes using toilet, bedpan or urinal?   [ ] 1   [ ] 2   [X] 3   [ ] 4   4. Putting on and taking off regular upper body clothing?   [ ] 1   [ ] 2   [X] 3   [ ] 4   5. Taking care of personal grooming such as brushing teeth? [ ] 1   [ ] 2   [X] 3   [ ] 4   6. Eating meals? [ ] 1   [ ] 2   [X] 3   [ ] 4   © 2007, Trustees of [address] [place] [address] [address] [id number], under license to [employer]. All rights reserved    Score:  Initial: 18 Most Recent: X (Date: -- )     Interpretation of Tool:  Represents activities that are increasingly more difficult (i.e. Bed mobility, Transfers, Gait). Score 24 23 22-20 19-15 14-10 9-7 6       Modifier CH CI CJ CK CL CM CN         · Self Care:               - CURRENT STATUS:    CK - 40%-59% impaired, limited or restricted               - GOAL STATUS:           CI - 1%-19% impaired, limited or restricted               - D/C STATUS:                       ---------------To be determined---------------  Payor: BLUE CROSS / Plan: [place] [facility] [employer] / Product Type: PPO /       Medical Necessity:     · Patient demonstrates good rehab potential due to higher previous functional level. Reason for Services/Other Comments:  · Patient continues to require skilled intervention due to decreased independence with ADL, instrumental ADL, and work tasks.    Use of outcome tool(s) and clinical judgement create a POC that gives a: MODERATE COMPLEXITY             TREATMENT:   (In addition to Assessment/Re-Assessment sessions the following treatments were rendered)      Pre-treatment Symptoms/Complaints:    Pain: Initial:   Pain Intensity 1: 5  Pain Location 1: Shoulder  Pain Orientation 1: Right, Posterior  Pain Intervention(s) 1: Cold pack, Heat applied (okay per RN)  Post Session:  same      Therapeutic Exercise: ( 15 minutes):  Exercises per grid below to improve mobility, strength, balance and coordination. Required moderate verbal cues to promote proper body alignment, promote proper body posture, promote proper body mechanics and promote proper body breathing techniques. Progressed range, repetitions and complexity of movement as indicated. Date:  4/24 Date:    Date:      Activity/Exercise Parameters Parameters Parameters   Gripping 10 reps       Wrist extension 10 reps       Elbow flexion/extension  10 reps AAROM       Cane ER 3-5 reps held 30 seconds each       Table slides 10-15 reps                    Manual Therapy: (2 minutes): Joint mobilization was utilized and necessary because of the patient's painful spasm. Gentle glenohumeral distraction performed 3 reps in loose pack position - patient reported decreased discomfort while completing. Braces/Orthotics/Lines/Etc:   · IV  · O2 Device: Nasal cannula  Treatment/Session Assessment:  Patient tolerated gentle UE exercises after heat applied. Recommended no overhead shoulder flexion and alternating rest/gentle UE exercise with patient verbalizing/demonstrating understanding. · Response to Treatment:  Tolerated treatment well without complications. · Interdisciplinary Collaboration:  · Occupational Therapist  · Registered Nurse  · MD (Dr. Bran Lindsay)  · After treatment position/precautions:  · Supine in bed  · Bed/Chair-wheels locked  · Bed in low position  · Call light within reach  · RN notified  · Family at bedside  · Side rails x 2  · Compliance with Program/Exercises: Will assess as treatment progresses. · Recommendations/Intent for next treatment session: \"Next visit will focus on advancements to more challenging activities\".   Total Treatment Duration:  OT Patient Time In/Time Out  Time In: 1125 (3312)  Time Out: 1150 (1328)     Carisa Close, OTD, OTR/L

## 2017-04-24 NOTE — PROGRESS NOTES
Problem: Mobility Impaired (Adult and Pediatric)  Goal: *Acute Goals and Plan of Care (Insert Text)  LTG:  (1.)Mr. Benny Kamara will move from supine to sit and sit to supine , scoot up and down and roll side to side in bed with INDEPENDENCE within 7 day(s). (2.)Mr. Benny Kamara will transfer from bed to chair and chair to bed with INDEPENDENCE using the least restrictive device within 7 day(s). (3.)Mr. Blanton will ambulate with INDEPENDENCE for >or= 500 feet with the least restrictive device, appropriate gait pattern and good dynamic standing balance within 7 day(s). (4.)Mr. Benny Kamara will perform B LE therapeutic exercises x 20 reps with INDEPENDENCE within 7 days to increase strength for improved safety and independence in transfers and gait. (5.)Mr. Benny Kamara will ascend/descend 4 steps with 1 handrail(s) and INDEPENDENCE within 7 day(s) for safe entry/exit of home.  ________________________________________________________________________________________________      PHYSICAL THERAPY: Daily Note, Treatment Day: 2nd and AM 4/24/2017  INPATIENT: Hospital Day: 4  Payor: Lennie Genao / Plan: [facility] / Product Type: PPO /      NAME/AGE/GENDER: Naomi Freitas is a 50 y.o. male           PRIMARY DIAGNOSIS: Acute mitral insufficiency [I34.0] Mitral valve regurgitation Mitral valve regurgitation  Procedure(s) (LRB):  MITRAL VALVE REPAIR (MVR) MINIMALLY INVASIVE. CRYOABALATION OF INTERCOSTAL SPACES (N/A)  ESOPHAGEAL TRANS ECHOCARDIOGRAM (N/A)  3 Days Post-Op  ICD-10: Treatment Diagnosis:       · Generalized Muscle Weakness (M62.81)  · Difficulty in walking, Not elsewhere classified (R26.2)   Precaution/Allergies:  Amoxil [amoxicillin] and Codeine       ASSESSMENT:      Mr. Benny Kamara presents sitting up in bedside chair. He is agreeable to treatment today. He ambulated with no assistive device and on room air. His O2 sats remained around 95% throughout treatment.  He needed verbal cues for pursed lip breathing. He returned to room and was instructed in seated exercises this treatment. He demonstrated good technique with the exercises. Good progress with activity tolerance noted. This section established at most recent assessment   PROBLEM LIST (Impairments causing functional limitations):  1. Decreased Strength  2. Decreased ADL/Functional Activities  3. Decreased Transfer Abilities  4. Decreased Ambulation Ability/Technique  5. Decreased Balance  6. Increased Pain  7. Decreased Activity Tolerance  8. Decreased Flexibility/Joint Mobility  9. Decreased [place] with Home Exercise Program    INTERVENTIONS PLANNED: (Benefits and precautions of physical therapy have been discussed with the patient.)  1. Balance Exercise  2. Bed Mobility  3. Family Education  4. Gait Training  5. Home Exercise Program (HEP)  6. Therapeutic Activites  7. Therapeutic Exercise/Strengthening  8. Transfer Training  9. Group Therapy      TREATMENT PLAN: Frequency/Duration: twice daily for duration of hospital stay  Rehabilitation Potential For Stated Goals: GOOD      RECOMMENDED REHABILITATION/EQUIPMENT: (at time of discharge pending progress): Continue Skilled Therapy. HISTORY:   History of Present Injury/Illness (Reason for Referral):  Per chart: Mr. Lawyer Haile is a very pleasant 61-year-old gentleman   who has had a several year history of worsening exertional   dyspnea. It became so profound that he presented to the   emergency room. An echo was performed which showed severe mitral   regurgitation with flail P2 segment. Transesophageal echo   confirmed this. Left heart catheterization showed clean   coronaries. Past Medical History/Comorbidities:   Mr. Lawyer Haile  has a past medical history of Acute mitral insufficiency; Difficult intubation; GERD (gastroesophageal reflux disease); History of kidney stones (1982); History of kidney stones; History of Jean Mountain spotted fever (1983);  Hypertension; Mitral regurgitation; Obesity (BMI 30-39.9) (04/17/2017); Pancreatic cyst; PONV (postoperative nausea and vomiting); Right shoulder pain (4/24/2017); Sleep apnea; and Thyroid nodule. Mr. Tonny Valenzuela  has a past surgical history that includes urological (1983); heent; sinus surgery proc unlisted (2002); lap cholecystectomy (2009); and ercp. Social History/Living Environment:   Home Environment: Private residence  # Steps to Enter: 3  Rails to Enter: Yes  Hand Rails : Bilateral  Wheelchair Ramp: No  One/Two Story Residence: Two story  # of Interior Steps: 12  Interior Rails: Left  Lift Chair Available: No  Living Alone: No  Support Systems: Spouse/Significant Other/Partner  Patient Expects to be Discharged to[de-identified] Private residence  Current DME Used/Available at Home: Blood pressure cuff  Tub or Shower Type: Shower  Prior Level of Function/Work/Activity:  Pt lives with wife. Works. Drives. No falls. Independent with all functional mobility and gait without assistive device. Independent with all ADLs. Number of Personal Factors/Comorbidities that affect the Plan of Care: 1-2: MODERATE COMPLEXITY   EXAMINATION:   Most Recent Physical Functioning:   Gross Assessment:                  Posture:  Posture (WDL): Within defined limits  Balance:  Sitting: Intact  Sitting - Static: Good (unsupported)  Sitting - Dynamic: Good (unsupported)  Standing: Intact  Standing - Static: Good  Standing - Dynamic : Good Bed Mobility:     Wheelchair Mobility:     Transfers:  Sit to Stand: Independent  Stand to Sit: Independent  Gait:     Base of Support: Widened  Speed/Tracey: Slow  Gait Abnormalities: Decreased step clearance  Distance (ft): 250 Feet (ft)  Ambulation - Level of Assistance: Contact guard assistance       Body Structures Involved:  1. Heart  2. Joints  3. Muscles Body Functions Affected:  1. Cardio  2. Neuromusculoskeletal  3. Movement Related Activities and Participation Affected:  1.  General Tasks and Demands  2. Mobility  3. Self Care  4. Domestic Life   Number of elements that affect the Plan of Care: 4+: HIGH COMPLEXITY   CLINICAL PRESENTATION:   Presentation: Evolving clinical presentation with changing clinical characteristics: MODERATE COMPLEXITY   CLINICAL DECISION MAKIN [facility] Inpatient Short Form  How much difficulty does the patient currently have. .. Unable A Lot A Little None   1. Turning over in bed (including adjusting bedclothes, sheets and blankets)? [ ] 1   [ ] 2   [X] 3   [ ] 4   2. Sitting down on and standing up from a chair with arms ( e.g., wheelchair, bedside commode, etc.)   [ ] 1   [ ] 2   [X] 3   [ ] 4   3. Moving from lying on back to sitting on the side of the bed? [ ] 1   [ ] 2   [X] 3   [ ] 4   How much help from another person does the patient currently need. .. Total A Lot A Little None   4. Moving to and from a bed to a chair (including a wheelchair)? [ ] 1   [ ] 2   [X] 3   [ ] 4   5. Need to walk in hospital room? [ ] 1   [ ] 2   [X] 3   [ ] 4   6. Climbing 3-5 steps with a railing? [ ] 1   [ ] 2   [X] 3   [ ] 4   © , Trustees of [address] [place] [address], under license to [employer]. All rights reserved    Score:  Initial: 18 Most Recent: X (Date: -- )     Interpretation of Tool:  Represents activities that are increasingly more difficult (i.e. Bed mobility, Transfers, Gait).        Score 24 23 22-20 19-15 14-10 9-7 6       Modifier CH CI CJ CK CL CM CN         · Mobility - Walking and Moving Around:               - CURRENT STATUS:    CK - 40%-59% impaired, limited or restricted               - GOAL STATUS:           CK - 40%-59% impaired, limited or restricted               - D/C STATUS:                       ---------------To be determined---------------  Payor: BLUE CROSS / Plan: [facility] / Product Type: PPO /       Medical Necessity:     · Patient demonstrates good rehab potential due to higher previous functional level. Reason for Services/Other Comments:  · Patient continues to require present interventions due to patient's inability to function at baseline. Use of outcome tool(s) and clinical judgement create a POC that gives a: Questionable prediction of patient's progress: MODERATE COMPLEXITY                 TREATMENT:   (In addition to Assessment/Re-Assessment sessions the following treatments were rendered)   Pre-treatment Symptoms/Complaints: \"Do I need my sling? \"  Pain: Initial:   Pain Intensity 1: 0  Post Session: No pain complaints noted. Therapeutic Activity: (    13 minutes): Therapeutic activities including Chair transfers, Ambulation on level ground, toileting and dynamic standing balance activites to improve mobility, strength, balance and coordination. Required minimal verbal cues   to promote safety and awareness of environment by opening eyes and to promote deep breathing. Cues provided to increase step length in gait. Therapeutic Exercise: (10 Minutes):  Exercises per grid below to improve mobility, strength and activity tolerance. Required minimal verbal cues to promote proper body breathing techniques. Progressed repetitions and complexity of movement as indicated. Date:  4-24-17 Date:   Date:     ACTIVITY/EXERCISE AM PM AM PM AM PM   Ambulation:           Distance  Device  Duration         Seated Heel Raises x20        Seated Toe Raises x20        Seated Long Arc Quads x10        Seated Marching x10        Seated Hip Abduction x10                 B = bilateral; AA = active assistive; A = active; P = passive      Braces/Orthotics/Lines/Etc:   · None  Treatment/Session Assessment:    · Response to Treatment:  Patient tolerated treatment well.   · Interdisciplinary Collaboration:  · Physical Therapy Assistant and Registered Nurse  · After treatment position/precautions:  · Up in chair, Bed/Chair-wheels locked, Call light within reach, RN notified and Family at bedside  · Compliance with Program/Exercises: Will assess as treatment progresses. · Recommendations/Intent for next treatment session: \"Next visit will focus on advancements to more challenging activities and reduction in assistance provided\".   Total Treatment Duration:  PT Patient Time In/Time Out  Time In: 1055  Time Out: Edin 788, PTA

## 2017-04-24 NOTE — PROGRESS NOTES
Pt seen sitting up in chair in CVSD s/p MVR with Dr. Glenn Frias. Pt lives with wife in house. Drives self and does not use anything to ambulate with. Works full-time [employer]. Pt has PCP and see MD at [facility], and is able to get Rx with drug plan. Normally uses [facility] for [employer] and confirms BCBS. Discussed d/c POC for home with [facility]. Pt would like [facility] for nursing and phyiscal therapy. Will send referral to Mickie, liaison with [facility]. CM will continue to follow for any other needs for d/c. Care Management Interventions  PCP Verified by CM: Yes  Mode of Transport at Discharge: Other (see comment)  Transition of Care Consult (CM Consult): [address]: Yes  Physical Therapy Consult: Yes  Current Support Network: Lives with Spouse, Own Home  Confirm Follow Up Transport: Family  Plan discussed with Pt/Family/Caregiver: Yes  Freedom of Choice Offered:  Yes

## 2017-04-24 NOTE — PROGRESS NOTES
Critical Care Daily Progress Note: 4/24/2017  Admission Date: 4/21/2017    SURGICAL  Procedure(s):  MITRAL VALVE REPAIR (MVR) MINIMALLY INVASIVE. CRYOABALATION OF INTERCOSTAL SPACES  ESOPHAGEAL TRANS ECHOCARDIOGRAM --- 3 Days Post-Op :  4/21/2017    Chart Reviewed.   Subjective:     R shoulder pain with abduct - most painful   Still sedated- trying to back down on Narcs    Objective:     Current Facility-Administered Medications   Medication Dose Route Frequency    traMADol (ULTRAM) tablet 100 mg  100 mg Oral Q6H PRN    ibuprofen (MOTRIN) tablet 800 mg  800 mg Oral QID    senna-docusate (PERICOLACE) 8.6-50 mg per tablet 2 Tab  2 Tab Oral BID    oxyCODONE-acetaminophen (PERCOCET) 5-325 mg per tablet 1 Tab  1 Tab Oral Q4H PRN    guaiFENesin ER (MUCINEX) tablet 1,200 mg  1,200 mg Oral BID    bisacodyl (DULCOLAX) tablet 10 mg  10 mg Oral DAILY PRN    simethicone (MYLICON) tablet 80 mg  80 mg Oral QID PRN    acyclovir (ZOVIRAX) capsule 200 mg  200 mg Oral DAILY    sodium chloride (NS) flush 5-10 mL  5-10 mL IntraVENous Q8H    sodium chloride (NS) flush 5-10 mL  5-10 mL IntraVENous PRN    alum-mag hydroxide-simeth (MYLANTA) oral suspension 30 mL  30 mL Oral Q4H PRN    acetaminophen (TYLENOL) tablet 650 mg  650 mg Oral Q4H PRN    zolpidem (AMBIEN) tablet 5 mg  5 mg Oral QHS PRN    mupirocin (BACTROBAN) 2 % ointment   Both Nostrils BID    aspirin delayed-release tablet 81 mg  81 mg Oral DAILY    magnesium oxide (MAG-OX) tablet 400 mg  400 mg Oral TID PRN    magnesium oxide (MAG-OX) tablet 400 mg  400 mg Oral QID PRN    potassium chloride (K-DUR, KLOR-CON) SR tablet 20 mEq  20 mEq Oral BID PRN    potassium chloride (K-DUR, KLOR-CON) SR tablet 40 mEq  40 mEq Oral BID PRN    ondansetron (ZOFRAN) injection 4 mg  4 mg IntraVENous Q4H PRN    famotidine (PEPCID) tablet 20 mg  20 mg Oral Q12H    scopolamine (TRANSDERM-SCOP) 1.5 mg  1.5 mg TransDERmal B33N    methyl salicylate-menthol (BENGAY) 15-10 % cream   Topical Q12H    amiodarone (CORDARONE) tablet 200 mg  200 mg Oral Q12H    metoprolol tartrate (LOPRESSOR) tablet 25 mg  25 mg Oral Q12H    traMADol (ULTRAM) tablet 50 mg  50 mg Oral Q6H PRN    HYDROmorphone (PF) (DILAUDID) injection 1 mg  1 mg IntraVENous Q4H PRN     Vitals:    04/23/17 1853 04/23/17 2230 04/24/17 0242 04/24/17 0700   BP: 134/64 136/80 127/70 114/61   Pulse: 69 64 74 66   Resp: 16 16 16 18   Temp: 97.8 °F (36.6 °C) 97.4 °F (36.3 °C) 97.7 °F (36.5 °C) 97.6 °F (36.4 °C)   SpO2: 93% 95% 97% 94%   Weight:   276 lb 4.8 oz (125.3 kg)    Height:           Physical Exam:            GEN: well developed and in no acute distress,   HEENT:  PERRL, EOMI, no alar flaring or epistaxis, oral mucosa moist without cyanosis,   NECK:  no JVD, no retractions, no thyromegaly or masses,   LUNGS:  poor effort, clear  CHEST: R axilla mini incisions CD  HEART:  RRR with no M,G,R;  ABDOMEN:  soft with no tenderness, positive bowel sounds present  EXTREMITIES:  warm with no cyanosis, mod edema  SKIN:  no jaundice or ecchymosis   NEURO:  alert and oriented, grossly non-focal    CHEST XRAY: Persistent right lower lobe airspace opacity with a right pleural  effusion. LAB:  Recent Labs      04/23/17   0505  04/22/17   0200  04/21/17   2200   04/21/17   1425   WBC  12.6*  11.7*   --    --   20.7*   HGB  11.4*  13.1*  13.9   < >  14.3   HCT  33.3*  36.7*  38.4*   < >  40.6*   PLT  124*  143*   --    --   159    < > = values in this interval not displayed. Recent Labs      04/24/17   0440  04/23/17   0505  04/22/17   0200   04/21/17   1425   NA  140  143  146*   --   148*   K  3.6  4.4  4.5   < >  4.1   CL  101  104  112*   --   111*   GLU  82  109*  141*   --   121*   CO2  35*  32  24   --   24   BUN  19  13  13   --   14   CREA  1.22  1.27  1.36   --   1.69*   MG  2.1  2.3  2.1   < >  2.8*   INR   --    --    --    --   1.1    < > = values in this interval not displayed.      Recent Labs      04/21/17 1915 04/21/17   1810  04/21/17   1424   PH  7.33*  7.28*  7.20*   PCO2  33*  36  50*   PO2  87  75  83   HCO3  17*  17*  19*       Assessment:     Principal Problem:    Mitral valve regurgitation (4/21/2017)      Overview: 4/21/17 (Dr Narciso Dangelo) 1. Minimally invasive mitral valve repair with P2       resection and       32 mm Physio II annuloplasty ring. 2. Cryo intercostal nerve block for 5 segments.      Active Problems:    Essential hypertension (3/7/2017)      Shortness of breath (3/7/2017)      BRITTNEY (obstructive sleep apnea) (3/7/2017)      GERD (gastroesophageal reflux disease) (3/7/2017)      Hypoxemia (4/22/2017)      Thyroid nodule (4/23/2017)      Overview: On US carotid 4/17        Plan:     POD 3 - Mini Mitral- TPW removed  R shoulder pain- acute on chronic (old) - bumping up NSAID dose - Cr normalized  Limit Narcs- ultram only  Pulm adding IPPB's   Encourage PO fluids and ambulation  NSR  Home soon    Chrissy Terrazas, NP

## 2017-04-24 NOTE — PROGRESS NOTES
[address]  Face to Face Encounter    Patients Name: Donna Thornton    YOB: 1965    Primary Diagnosis: s/p MVR  Ordering Physician: Dr. Patricia Au    Date of Face to Face:   4/24/2017                                  Face to Face Encounter findings are related to primary reason for home care:   yes. 1. I certify that the patient needs intermittent care as follows: skilled nursing care:  skilled observation/assessment, patient education, wound care and rehabilitative nursing  physical therapy: strengthening, gait/stair training and balance training    2. I certify that this patient is homebound, that is: 1) patient requires the use of a none device, special transportation, or assistance of another to leave the home; or 2) patient's condition makes leaving the home medically contraindicated; and 3) patient has a normal inability to leave the home and leaving the home requires considerable and taxing effort. Patient may leave the home for infrequent and short duration for medical reasons, and occasional absences for non-medical reasons. Homebound status is due to the following functional limitations: Patient currently under activity restrictions secondary to recent surgical procedure, this hinders their ability to safely leave the home. Patient with increased shortness of breath and elevated heart rate with ambulation greater than 20 feet limiting patient's ability to ambulate safely within the community. Patient with strength deficits limiting the performance of all ADL's without caregiver assistance or the use of an assistive device. Patient with poor safety awareness and is at risk for falls without assistance of another person and the use of an assistive device. Patient with poor ambulation endurance limiting their safe ability to ascend/descend the required number of steps to leave the home.   Patient with increased shortness of breath with all exertional activity limiting ambulation outside the home. Patient with strength deficits limiting the ability to carry portable O2 for distances outside the home without the assistance of a caregiver. 3. I certify that this patient is under my care and that I, or a nurse practitioner or  [id number], or clinical nurse specialist, or certified nurse midwife, working with me, had a Face-to-Face Encounter that meets the physician Face-to-Face Encounter requirements. The following are the clinical findings from the [address] [place] [address] encounter that support the need for skilled services and is a summary of the encounter:     See hospital chart. Erik Wang RN  4/24/2017      THE FOLLOWING TO BE COMPLETED BY THE COMMUNITY PHYSICIAN:    I concur with the findings described above from the F encounter that this patient is homebound and in need of a skilled service.     Certifying Physician: _____________________________________      Printed Certifying Physician Name: _____________________________________    Date: _________________

## 2017-04-24 NOTE — PROGRESS NOTES
Bedside and Verbal shift change report given to Lori Stoll (oncoming nurse) by Kathy Bashir RN (offgoing nurse).

## 2017-04-24 NOTE — INTERDISCIPLINARY ROUNDS
Interdisciplinary team rounds were held 4/24/2017 with the following team members:Care Management, Nursing, Nurse Practitioner and Clinical Coordinator . Working with PT/OT for shoulder pain, decrease narcotics. Needs to work on deep breathing and ambulating. Plan of care discussed. See clinical pathway and/or care plan for interventions and desired outcomes.

## 2017-04-24 NOTE — PROGRESS NOTES
Problem: Mobility Impaired (Adult and Pediatric)  Goal: *Acute Goals and Plan of Care (Insert Text)  LTG:  (1.)Mr. Abigail Roberts will move from supine to sit and sit to supine , scoot up and down and roll side to side in bed with INDEPENDENCE within 7 day(s). (2.)Mr. Abigail Roberts will transfer from bed to chair and chair to bed with INDEPENDENCE using the least restrictive device within 7 day(s). (3.)Mr. Blanton will ambulate with INDEPENDENCE for >or= 500 feet with the least restrictive device, appropriate gait pattern and good dynamic standing balance within 7 day(s). (4.)Mr. Abigail Roberts will perform B LE therapeutic exercises x 20 reps with INDEPENDENCE within 7 days to increase strength for improved safety and independence in transfers and gait. (5.)Mr. Abigail Roberts will ascend/descend 4 steps with 1 handrail(s) and INDEPENDENCE within 7 day(s) for safe entry/exit of home.  ________________________________________________________________________________________________      PHYSICAL THERAPY: Daily Note, Treatment Day: 2nd and PM 4/24/2017  INPATIENT: Hospital Day: 4  Payor: Alonso Abreu / Plan: Pierre Valentin 954 / Product Type: PPO /      NAME/AGE/GENDER: Ramsey Arreguin is a 50 y.o. male           PRIMARY DIAGNOSIS: Acute mitral insufficiency [I34.0] Mitral valve regurgitation Mitral valve regurgitation  Procedure(s) (LRB):  MITRAL VALVE REPAIR (MVR) MINIMALLY INVASIVE. CRYOABALATION OF INTERCOSTAL SPACES (N/A)  ESOPHAGEAL TRANS ECHOCARDIOGRAM (N/A)  3 Days Post-Op  ICD-10: Treatment Diagnosis:       · Generalized Muscle Weakness (M62.81)  · Difficulty in walking, Not elsewhere classified (R26.2)   Precaution/Allergies:  Amoxil [amoxicillin] and Codeine       ASSESSMENT:      Mr. Abigail Roberts presents sitting up in bedside chair. He is agreeable to treatment today. He ambulated with no assistive device and on room air. His O2 sats remained around 95% throughout treatment.  He needed verbal cues for pursed lip breathing. He returned to room and was instructed in seated exercises this treatment. He demonstrated good technique with the exercises. Good progress with activity tolerance noted. PM: Patient presents sitting up in chair. He was agreeable to treatment. He needed verbal cues for pursed lip breathing throughout treatment. He ambulated on room air and his O2 sats were 91% with pursed lip breathing. Good progress with activity tolerance this treatment. This section established at most recent assessment   PROBLEM LIST (Impairments causing functional limitations):  1. Decreased Strength  2. Decreased ADL/Functional Activities  3. Decreased Transfer Abilities  4. Decreased Ambulation Ability/Technique  5. Decreased Balance  6. Increased Pain  7. Decreased Activity Tolerance  8. Decreased Flexibility/Joint Mobility  9. Decreased [place] with Home Exercise Program    INTERVENTIONS PLANNED: (Benefits and precautions of physical therapy have been discussed with the patient.)  1. Balance Exercise  2. Bed Mobility  3. Family Education  4. Gait Training  5. Home Exercise Program (HEP)  6. Therapeutic Activites  7. Therapeutic Exercise/Strengthening  8. Transfer Training  9. Group Therapy      TREATMENT PLAN: Frequency/Duration: twice daily for duration of hospital stay  Rehabilitation Potential For Stated Goals: GOOD      RECOMMENDED REHABILITATION/EQUIPMENT: (at time of discharge pending progress): Continue Skilled Therapy. HISTORY:   History of Present Injury/Illness (Reason for Referral):  Per chart: Mr. Nickolas Geiger is a very pleasant 82-year-old gentleman   who has had a several year history of worsening exertional   dyspnea. It became so profound that he presented to the   emergency room. An echo was performed which showed severe mitral   regurgitation with flail P2 segment. Transesophageal echo   confirmed this. Left heart catheterization showed clean   coronaries.   Past Medical History/Comorbidities:   Mr. Cornelius Martinez  has a past medical history of Acute mitral insufficiency; Difficult intubation; GERD (gastroesophageal reflux disease); History of kidney stones (1982); History of kidney stones; History of [place] spotted fever (1983); Hypertension; Mitral regurgitation; Obesity (BMI 30-39.9) (04/17/2017); Pancreatic cyst; PONV (postoperative nausea and vomiting); Right shoulder pain (4/24/2017); Sleep apnea; and Thyroid nodule. Mr. Cornelius Martinez  has a past surgical history that includes urological (1983); heent; sinus surgery proc unlisted (2002); lap cholecystectomy (2009); and ercp. Social History/Living Environment:   Home Environment: Private residence  # Steps to Enter: 3  Rails to Enter: Yes  Hand Rails : Bilateral  Wheelchair Ramp: No  One/Two Story Residence: Two story  # of Interior Steps: 12  Interior Rails: Left  Lift Chair Available: No  Living Alone: No  Support Systems: Spouse/Significant Other/Partner  Patient Expects to be Discharged to[de-identified] Private residence  Current DME Used/Available at Home: Blood pressure cuff  Tub or Shower Type: Shower  Prior Level of Function/Work/Activity:  Pt lives with wife. Works. Drives. No falls. Independent with all functional mobility and gait without assistive device. Independent with all ADLs.       Number of Personal Factors/Comorbidities that affect the Plan of Care: 1-2: MODERATE COMPLEXITY   EXAMINATION:   Most Recent Physical Functioning:   Gross Assessment:                  Posture:  Posture (WDL): Within defined limits  Balance:  Sitting: Intact  Sitting - Static: Good (unsupported)  Sitting - Dynamic: Good (unsupported)  Standing: Intact  Standing - Static: Good  Standing - Dynamic : Good Bed Mobility:     Wheelchair Mobility:     Transfers:  Sit to Stand: Independent  Stand to Sit: Independent  Gait:     Base of Support: Widened  Speed/Tracey: Slow  Gait Abnormalities: Decreased step clearance  Distance (ft): 250 Feet (ft)  Ambulation - Level of Assistance: Stand-by asssistance       Body Structures Involved:  1. Heart  2. Joints  3. Muscles Body Functions Affected:  1. Cardio  2. Neuromusculoskeletal  3. Movement Related Activities and Participation Affected:  1. General Tasks and Demands  2. Mobility  3. Self Care  4. Domestic Life   Number of elements that affect the Plan of Care: 4+: HIGH COMPLEXITY   CLINICAL PRESENTATION:   Presentation: Evolving clinical presentation with changing clinical characteristics: MODERATE COMPLEXITY   CLINICAL DECISION MAKIN [facility] Inpatient Short Form  How much difficulty does the patient currently have. .. Unable A Lot A Little None   1. Turning over in bed (including adjusting bedclothes, sheets and blankets)? [ ] 1   [ ] 2   [X] 3   [ ] 4   2. Sitting down on and standing up from a chair with arms ( e.g., wheelchair, bedside commode, etc.)   [ ] 1   [ ] 2   [X] 3   [ ] 4   3. Moving from lying on back to sitting on the side of the bed? [ ] 1   [ ] 2   [X] 3   [ ] 4   How much help from another person does the patient currently need. .. Total A Lot A Little None   4. Moving to and from a bed to a chair (including a wheelchair)? [ ] 1   [ ] 2   [X] 3   [ ] 4   5. Need to walk in hospital room? [ ] 1   [ ] 2   [X] 3   [ ] 4   6. Climbing 3-5 steps with a railing? [ ] 1   [ ] 2   [X] 3   [ ] 4   © , Trustees of [address] [place], [place] [address], under license to [employer]. All rights reserved    Score:  Initial: 18 Most Recent: X (Date: -- )     Interpretation of Tool:  Represents activities that are increasingly more difficult (i.e. Bed mobility, Transfers, Gait).        Score 24 23 22-20 19-15 14-10 9-7 6       Modifier CH CI CJ CK CL CM CN         · Mobility - Walking and Moving Around:               - CURRENT STATUS:    CK - 40%-59% impaired, limited or restricted               - GOAL STATUS:           CK - 40%-59% impaired, limited or restricted               - D/C STATUS:                       ---------------To be determined---------------  Payor: BLUE CROSS / Plan: [place] BLUE CROSS [facility] / Product Type: PPO /       Medical Necessity:     · Patient demonstrates good rehab potential due to higher previous functional level. Reason for Services/Other Comments:  · Patient continues to require present interventions due to patient's inability to function at baseline. Use of outcome tool(s) and clinical judgement create a POC that gives a: Questionable prediction of patient's progress: MODERATE COMPLEXITY                 TREATMENT:   (In addition to Assessment/Re-Assessment sessions the following treatments were rendered)   Pre-treatment Symptoms/Complaints: \"My shoulder feels some better. \"  Pain: Initial:   Pain Intensity 1: 0  Post Session: No pain complaints noted. Therapeutic Activity: (    13 minutes): Therapeutic activities including Chair transfers, Ambulation on level ground, toileting and dynamic standing balance activites to improve mobility, strength, balance and coordination. Required minimal verbal cues   to promote safety and awareness of environment by opening eyes and to promote deep breathing. Cues provided to increase step length in gait. Therapeutic Exercise: (10 Minutes):  Exercises per grid below to improve mobility, strength and activity tolerance. Required minimal verbal cues to promote proper body breathing techniques. Progressed repetitions and complexity of movement as indicated.        Date:  4-24-17 Date:   Date:     ACTIVITY/EXERCISE AM PM AM PM AM PM   Ambulation:           Distance  Device  Duration         Seated Heel Raises x20 x20       Seated Toe Raises x20 x20       Seated Long Arc Quads x10 x10       Seated Marching x10 x10       Seated Hip Abduction x10                 B = bilateral; AA = active assistive; A = active; P = passive      Braces/Orthotics/Lines/Etc: · None  Treatment/Session Assessment:    · Response to Treatment:  Patient tolerated treatment well. · Interdisciplinary Collaboration:  · Physical Therapy Assistant and Registered Nurse  · After treatment position/precautions:  · Up in chair, Bed/Chair-wheels locked, Call light within reach, RN notified and Family at bedside  · Compliance with Program/Exercises: Will assess as treatment progresses. · Recommendations/Intent for next treatment session: \"Next visit will focus on advancements to more challenging activities and reduction in assistance provided\".   Total Treatment Duration:  PT Patient Time In/Time Out  Time In: 1408  Time Out: [id number], PTA

## 2017-04-24 NOTE — PROGRESS NOTES
Bedside and Verbal shift change report given to Compa Velasquez RN (oncoming nurse) by Edin Jones RN (offgoing nurse).

## 2017-04-24 NOTE — PROGRESS NOTES
Patient 02 sat mid 80's on room air. Patient encourage to deep breathe and use IS. Patient continues to sat in 80's despite these interventions. Placed on 2L NC. O2 sat 92%. Will continue to monitor.

## 2017-04-24 NOTE — PROGRESS NOTES
Patient complains of feeling nauseated. Offered crackers and applesauce and encouraged to take food with medications. Zofran and simethicone given. Patient reports relief at this time.

## 2017-04-25 VITALS
DIASTOLIC BLOOD PRESSURE: 55 MMHG | RESPIRATION RATE: 19 BRPM | SYSTOLIC BLOOD PRESSURE: 104 MMHG | BODY MASS INDEX: 38.85 KG/M2 | HEART RATE: 68 BPM | TEMPERATURE: 97.2 F | HEIGHT: 70 IN | WEIGHT: 271.4 LBS | OXYGEN SATURATION: 92 %

## 2017-04-25 PROBLEM — R09.02 HYPOXEMIA: Status: RESOLVED | Noted: 2017-04-22 | Resolved: 2017-04-25

## 2017-04-25 LAB
ANION GAP BLD CALC-SCNC: 7 MMOL/L (ref 7–16)
BUN SERPL-MCNC: 14 MG/DL (ref 6–23)
CALCIUM SERPL-MCNC: 7.8 MG/DL (ref 8.3–10.4)
CHLORIDE SERPL-SCNC: 102 MMOL/L (ref 98–107)
CO2 SERPL-SCNC: 32 MMOL/L (ref 21–32)
CREAT SERPL-MCNC: 1.05 MG/DL (ref 0.8–1.5)
GLUCOSE SERPL-MCNC: 78 MG/DL (ref 65–100)
MAGNESIUM SERPL-MCNC: 2.1 MG/DL (ref 1.8–2.4)
POTASSIUM SERPL-SCNC: 3.8 MMOL/L (ref 3.5–5.1)
SODIUM SERPL-SCNC: 141 MMOL/L (ref 136–145)

## 2017-04-25 PROCEDURE — 74011250637 HC RX REV CODE- 250/637: Performed by: THORACIC SURGERY (CARDIOTHORACIC VASCULAR SURGERY)

## 2017-04-25 PROCEDURE — 74011250636 HC RX REV CODE- 250/636: Performed by: THORACIC SURGERY (CARDIOTHORACIC VASCULAR SURGERY)

## 2017-04-25 PROCEDURE — 94760 N-INVAS EAR/PLS OXIMETRY 1: CPT

## 2017-04-25 PROCEDURE — 99232 SBSQ HOSP IP/OBS MODERATE 35: CPT | Performed by: INTERNAL MEDICINE

## 2017-04-25 PROCEDURE — 94640 AIRWAY INHALATION TREATMENT: CPT

## 2017-04-25 PROCEDURE — 97530 THERAPEUTIC ACTIVITIES: CPT

## 2017-04-25 PROCEDURE — 36415 COLL VENOUS BLD VENIPUNCTURE: CPT | Performed by: NURSE PRACTITIONER

## 2017-04-25 PROCEDURE — 74011250637 HC RX REV CODE- 250/637: Performed by: INTERNAL MEDICINE

## 2017-04-25 PROCEDURE — 74011000250 HC RX REV CODE- 250: Performed by: PHYSICIAN ASSISTANT

## 2017-04-25 PROCEDURE — 77010033678 HC OXYGEN DAILY

## 2017-04-25 PROCEDURE — 80048 BASIC METABOLIC PNL TOTAL CA: CPT | Performed by: NURSE PRACTITIONER

## 2017-04-25 PROCEDURE — 74011250636 HC RX REV CODE- 250/636: Performed by: PHYSICIAN ASSISTANT

## 2017-04-25 PROCEDURE — 83735 ASSAY OF MAGNESIUM: CPT | Performed by: NURSE PRACTITIONER

## 2017-04-25 PROCEDURE — 97112 NEUROMUSCULAR REEDUCATION: CPT

## 2017-04-25 PROCEDURE — 97110 THERAPEUTIC EXERCISES: CPT

## 2017-04-25 RX ORDER — FUROSEMIDE 10 MG/ML
40 INJECTION INTRAMUSCULAR; INTRAVENOUS ONCE
Status: COMPLETED | OUTPATIENT
Start: 2017-04-25 | End: 2017-04-25

## 2017-04-25 RX ORDER — FUROSEMIDE 40 MG/1
40 TABLET ORAL DAILY
Qty: 6 TAB | Refills: 0 | Status: SHIPPED | OUTPATIENT
Start: 2017-04-25 | End: 2017-04-30

## 2017-04-25 RX ORDER — ALBUTEROL SULFATE 0.83 MG/ML
2.5 SOLUTION RESPIRATORY (INHALATION)
Status: DISCONTINUED | OUTPATIENT
Start: 2017-04-25 | End: 2017-04-25 | Stop reason: HOSPADM

## 2017-04-25 RX ORDER — FUROSEMIDE 40 MG/1
40 TABLET ORAL DAILY
Qty: 6 TAB | Refills: 0 | Status: SHIPPED | OUTPATIENT
Start: 2017-04-25 | End: 2017-04-25

## 2017-04-25 RX ORDER — IBUPROFEN 800 MG/1
800 TABLET ORAL 4 TIMES DAILY
Qty: 56 TAB | Refills: 0 | Status: ON HOLD | OUTPATIENT
Start: 2017-04-25 | End: 2017-04-30

## 2017-04-25 RX ORDER — METOPROLOL TARTRATE 25 MG/1
25 TABLET, FILM COATED ORAL EVERY 12 HOURS
Qty: 60 TAB | Refills: 5 | Status: SHIPPED | OUTPATIENT
Start: 2017-04-25 | End: 2017-05-01

## 2017-04-25 RX ORDER — POTASSIUM CHLORIDE 20 MEQ/1
20 TABLET, EXTENDED RELEASE ORAL DAILY
Qty: 6 TAB | Refills: 0 | Status: SHIPPED | OUTPATIENT
Start: 2017-04-25 | End: 2017-04-30

## 2017-04-25 RX ORDER — TRAMADOL HYDROCHLORIDE 50 MG/1
50 TABLET ORAL
Qty: 30 TAB | Refills: 0 | Status: SHIPPED | OUTPATIENT
Start: 2017-04-25 | End: 2017-04-25

## 2017-04-25 RX ADMIN — TAPENTADOL HYDROCHLORIDE 100 MG: 50 TABLET, FILM COATED ORAL at 03:34

## 2017-04-25 RX ADMIN — IBUPROFEN 800 MG: 400 TABLET, FILM COATED ORAL at 11:40

## 2017-04-25 RX ADMIN — MUPIROCIN: 20 OINTMENT TOPICAL at 07:53

## 2017-04-25 RX ADMIN — GUAIFENESIN 1200 MG: 600 TABLET, EXTENDED RELEASE ORAL at 07:35

## 2017-04-25 RX ADMIN — Medication 10 ML: at 13:14

## 2017-04-25 RX ADMIN — Medication: at 07:52

## 2017-04-25 RX ADMIN — Medication 10 ML: at 06:10

## 2017-04-25 RX ADMIN — ACYCLOVIR 200 MG: 200 CAPSULE ORAL at 07:37

## 2017-04-25 RX ADMIN — IBUPROFEN 800 MG: 400 TABLET, FILM COATED ORAL at 06:10

## 2017-04-25 RX ADMIN — FUROSEMIDE 40 MG: 10 INJECTION, SOLUTION INTRAMUSCULAR; INTRAVENOUS at 09:22

## 2017-04-25 RX ADMIN — TAPENTADOL HYDROCHLORIDE 100 MG: 50 TABLET, FILM COATED ORAL at 07:36

## 2017-04-25 RX ADMIN — METOPROLOL TARTRATE 25 MG: 25 TABLET ORAL at 07:37

## 2017-04-25 RX ADMIN — ONDANSETRON 4 MG: 2 INJECTION INTRAMUSCULAR; INTRAVENOUS at 07:43

## 2017-04-25 RX ADMIN — AMIODARONE HYDROCHLORIDE 200 MG: 200 TABLET ORAL at 07:36

## 2017-04-25 RX ADMIN — POTASSIUM CHLORIDE 20 MEQ: 20 TABLET, EXTENDED RELEASE ORAL at 07:43

## 2017-04-25 RX ADMIN — ASPIRIN 81 MG: 81 TABLET, COATED ORAL at 07:36

## 2017-04-25 RX ADMIN — FAMOTIDINE 20 MG: 20 TABLET ORAL at 07:37

## 2017-04-25 RX ADMIN — ALBUTEROL SULFATE 2.5 MG: 2.5 SOLUTION RESPIRATORY (INHALATION) at 08:53

## 2017-04-25 NOTE — PROGRESS NOTES
Laura Landa  Admission Date: 4/21/2017             Daily Progress Note: 4/25/2017    The patient's chart is reviewed and the patient is discussed with the staff. 51 yo male with a history of BRITTNEY, HTN, GERD, prior history of herpes infection of left eye, BRITTNEY, and MR. Presented to the ER at [facility] with tingling in left side of face and down left arm. Imaging negative for CVA and pt felt to have TIA. Pt had echo which revealed EF 55-60%, no WMA, MV with moderate prolapse involving the posterior leaflet with some nodular appearance of of the posterior leaflet and MARCUS recommended to exclude vegetation. He had MARCUS which revealed no vegetation but did reveal significant posterior leaflet on MV regurgitation. He had LHC with normal coronaries and severe mitral regurgitation with flail P2. He was referred to Dr. Jeronimo Patrick and felt appropriate for MV replacement. Pt was admitted for elective mini MVR which was completed on 4/21/17 per Dr. Jeronimo Patrick. Post-op course complicated by ongoing shoulder pain. Subjective:     Pt lying in bed on RA-[id number]%. The patient placed back on 2L O2. Pt pulling 2000cc on IS. Pt continues to complain of right shoulder pain. Pt had 3 BMs yesterday.      Current Facility-Administered Medications   Medication Dose Route Frequency    traMADol (ULTRAM) tablet 100 mg  100 mg Oral Q6H PRN    ibuprofen (MOTRIN) tablet 800 mg  800 mg Oral QID    guaiFENesin ER (MUCINEX) tablet 1,200 mg  1,200 mg Oral Q12H    mupirocin (BACTROBAN) 2 % ointment   Both Nostrils Q12H    tapentadol (NUCYNTA) tablet 100 mg  100 mg Oral Q4H PRN    senna-docusate (PERICOLACE) 8.6-50 mg per tablet 2 Tab  2 Tab Oral BID    bisacodyl (DULCOLAX) tablet 10 mg  10 mg Oral DAILY PRN    simethicone (MYLICON) tablet 80 mg  80 mg Oral QID PRN    acyclovir (ZOVIRAX) capsule 200 mg  200 mg Oral DAILY    sodium chloride (NS) flush 5-10 mL  5-10 mL IntraVENous Q8H    sodium chloride (NS) flush 5-10 mL 5-10 mL IntraVENous PRN    alum-mag hydroxide-simeth (MYLANTA) oral suspension 30 mL  30 mL Oral Q4H PRN    acetaminophen (TYLENOL) tablet 650 mg  650 mg Oral Q4H PRN    zolpidem (AMBIEN) tablet 5 mg  5 mg Oral QHS PRN    aspirin delayed-release tablet 81 mg  81 mg Oral DAILY    magnesium oxide (MAG-OX) tablet 400 mg  400 mg Oral TID PRN    magnesium oxide (MAG-OX) tablet 400 mg  400 mg Oral QID PRN    potassium chloride (K-DUR, KLOR-CON) SR tablet 20 mEq  20 mEq Oral BID PRN    potassium chloride (K-DUR, KLOR-CON) SR tablet 40 mEq  40 mEq Oral BID PRN    ondansetron (ZOFRAN) injection 4 mg  4 mg IntraVENous Q4H PRN    famotidine (PEPCID) tablet 20 mg  20 mg Oral J18X    methyl salicylate-menthol (BENGAY) 15-10 % cream   Topical Q12H    amiodarone (CORDARONE) tablet 200 mg  200 mg Oral Q12H    metoprolol tartrate (LOPRESSOR) tablet 25 mg  25 mg Oral Q12H    traMADol (ULTRAM) tablet 50 mg  50 mg Oral Q6H PRN    HYDROmorphone (PF) (DILAUDID) injection 1 mg  1 mg IntraVENous Q4H PRN       Review of Systems  +cough-dry  +R shoulder pain  Constitutional: negative for fever, chills, sweats  Cardiovascular: negative for chest pain, palpitations, syncope, edema  Gastrointestinal:  negative for dysphagia, reflux, vomiting, diarrhea, abdominal pain, or melena  Neurologic:  negative for focal weakness, numbness, headache    Objective:     Vitals:    04/24/17 2108 04/24/17 2242 04/25/17 0230 04/25/17 0700   BP: 129/56 101/53 115/70 124/64   Pulse: 71 66 72 74   Resp:  16 16 16   Temp:  97.9 °F (36.6 °C) 97.4 °F (36.3 °C) 97.6 °F (36.4 °C)   SpO2:  95% 93% 93%   Weight:   271 lb 6.4 oz (123.1 kg)    Height:         Intake and Output:   04/23 1901 - 04/25 0700  In: 1680 [P.O.:1680]  Out: 651 [Urine:650]       Physical Exam:   Constitution:  the patient is well developed and in no acute distress,on RA  EENMT:  Sclera clear, pupils equal, oral mucosa moist  Respiratory: diminished at bases  Cardiovascular:  RRR without M,G,R  Gastrointestinal: soft and non-tender; with positive bowel sounds. Musculoskeletal: warm without cyanosis. There is 1+ lower leg edema. Skin:  no jaundice or rashes, sternal incision clean, dry, intact   Neurologic: no gross neuro deficits     Psychiatric:  alert and oriented x 3    CHEST XRAY:           Weights:       LAB  Recent Labs      04/24/17   1750  04/23/17   0648  04/22/17 2014 04/22/17   1608  04/22/17   0928   GLUCPOC  90  121*  117*  128*  116*      Recent Labs      04/23/17   0505   WBC  12.6*   HGB  11.4*   HCT  33.3*   PLT  124*     Recent Labs      04/25/17   0515  04/24/17   0440  04/23/17   0505   NA  141  140  143   K  3.8  3.6  4.4   CL  102  101  104   CO2  32  35*  32   GLU  78  82  109*   BUN  14  19  13   CREA  1.05  1.22  1.27   MG  2.1  2.1  2.3   CA  7.8*  7.8*  8.4     No results for input(s): PH, PCO2, PO2, HCO3 in the last 72 hours. No results for input(s): LCAD, LAC in the last 72 hours. Assessment:  (Medical Decision Making)     Hospital Problems  Date Reviewed: 4/25/2017          Codes Class Noted POA    Right shoulder pain-pain control  ICD-10-CM: K42.770  ICD-9-CM: 719.41  4/24/2017 Clinically Undetermined    Overview Signed 4/24/2017 11:24 AM by Jose Powell NP     Acute on chronic (old injury)             Thyroid nodule (Chronic)-chronic ICD-10-CM: E04.1  ICD-9-CM: 241.0  4/23/2017 Yes    Overview Signed 4/23/2017  8:45 AM by Jose Powell NP     On US carotid 4/17             Hypoxemia-wean O2 as tolerated  ICD-10-CM: R09.02  ICD-9-CM: 799.02  4/22/2017 Unknown        * (Principal)Mitral valve regurgitation ICD-10-CM: I34.0  ICD-9-CM: 424.0  4/21/2017 Yes    Overview Signed 4/22/2017  8:31 AM by Jose Powell NP     4/21/17 (Dr Lemus Prude) 1. Minimally invasive mitral valve repair with P2 resection and   32 mm Physio II annuloplasty ring. 2. Cryo intercostal nerve block for 5 segments.               Essential hypertension (Chronic)-chronic,controlled ICD-10-CM: I10  ICD-9-CM: 401.9  3/7/2017 Yes        Shortness of breath-secondary to shallow inspiration ICD-10-CM: R06.02  ICD-9-CM: 786.05  3/7/2017 Yes        BRITTNEY (obstructive sleep apnea) (Chronic)-noncompliant with CPAP ICD-10-CM: G47.33  ICD-9-CM: 327.23  3/7/2017 Yes        GERD (gastroesophageal reflux disease) (Chronic) ICD-10-CM: K21.9  ICD-9-CM: 530.81  3/7/2017 Yes              Plan:  (Medical Decision Making)     --wean O2 as tolerated  --lasix 40mg now  --IPPBs  --OOB/PT  --OT  --recommend CPAP after discharge    More than 50% of the time documented was spent in face-to-face contact with the patient and in the care of the patient on the floor/unit where the patient is located. DAVID Vance    Lungs: decreased BS in the bases  Heart:  RRR with no Murmur/Rubs/Gallops    Additional Comments:  Agree with daily Lasix for 1 week. Cont. IS and arrange OP F/U with me in the sleep center in 1-2 months    I have spoken with and examined the patient. I agree with the above assessment and plan as documented.     Jayleen Go MD

## 2017-04-25 NOTE — PROGRESS NOTES
Bedside and Verbal shift change report given to Lety Vides (oncoming nurse) by Claven Nissen, RN (offgoing nurse).

## 2017-04-25 NOTE — PROGRESS NOTES
Spot check O2. Sat 86%. Put on 2L NC. O2 sat increased 93%. Encouraged ambulation and use of IS. Will attempt to wean again later this morning.

## 2017-04-25 NOTE — DISCHARGE SUMMARY
Physician Discharge Summary     Patient: Jose Márquez MRN: [id number]  SSN: [id number]    YOB: 1965  Age: 50 y.o. Sex: male       Admit Date: 4/21/2017    Discharge Date: 4/25/2017      Admitting Physician: Leeann Casillas MD     Discharge Physician: Mamadou Stringer NP    Admission Diagnoses: Acute mitral insufficiency [I34.0]    Discharge Diagnoses:   Problem List as of 4/25/2017  Date Reviewed: 4/25/2017          Codes Class Noted - Resolved    Right shoulder pain ICD-10-CM: M25.511  ICD-9-CM: 719.41  4/24/2017 - Present    Overview Signed 4/24/2017 11:24 AM by Mamadou Stringer NP     Acute on chronic (old injury)             Thyroid nodule (Chronic) ICD-10-CM: E04.1  ICD-9-CM: 241.0  4/23/2017 - Present    Overview Signed 4/23/2017  8:45 AM by Mamadou Stringer NP     On US carotid 4/17             * (Principal)Mitral valve regurgitation ICD-10-CM: I34.0  ICD-9-CM: 424.0  4/21/2017 - Present    Overview Signed 4/22/2017  8:31 AM by Mamadou Stringre NP     4/21/17 (Dr Jennifer Lange) 1. Minimally invasive mitral valve repair with P2 resection and   32 mm Physio II annuloplasty ring. 2. Cryo intercostal nerve block for 5 segments.               Mitral insufficiency ICD-10-CM: I34.0  ICD-9-CM: 424.0  3/8/2017 - Present        Mitral regurgitation ICD-10-CM: I34.0  ICD-9-CM: 424.0  3/7/2017 - Present        Essential hypertension (Chronic) ICD-10-CM: I10  ICD-9-CM: 401.9  3/7/2017 - Present        TIA (transient ischemic attack) ICD-10-CM: G45.9  ICD-9-CM: 435.9  3/7/2017 - Present        Shortness of breath ICD-10-CM: R06.02  ICD-9-CM: 786.05  3/7/2017 - Present        BRITTNEY (obstructive sleep apnea) (Chronic) ICD-10-CM: G47.33  ICD-9-CM: 327.23  3/7/2017 - Present        GERD (gastroesophageal reflux disease) (Chronic) ICD-10-CM: K21.9  ICD-9-CM: 530.81  3/7/2017 - Present        RESOLVED: Hypoxemia ICD-10-CM: R09.02  ICD-9-CM: 799.02  4/22/2017 - 4/25/2017        RESOLVED: Encounter for weaning from ventilator [facility]) ICD-10-CM: Z99.11  ICD-9-CM: V46.13  4/21/2017 - 4/22/2017               Procedures for this admission: Procedure(s):  MITRAL VALVE REPAIR (MVR) MINIMALLY INVASIVE. CRYOABALATION OF INTERCOSTAL SPACES  ESOPHAGEAL TRANS ECHOCARDIOGRAM    Discharged Condition: stable    Hospital Course: 1. Minimally invasive mitral valve repair with P2 resection and   32 mm Physio II annuloplasty ring. 2. Cryo intercostal nerve block for 5 segments.       Mr. Anu Boyle is a very pleasant 53-year-old gentleman   who has had a several year history of worsening exertional   dyspnea. It became so profound that he presented to the   emergency room. An echo was performed which showed severe mitral   regurgitation with flail P2 segment. Transesophageal echo   confirmed this. Left heart catheterization showed clean   Coronaries. The patient tolerated the surgery well and transitioned to stepdown POD 1. His recovery has been complicated by acute on chronic R shoulder pain post operatively. 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 We obtained PT and OT and offered Orthopedic consult but the patient declined and will see Ortho as OP if needed. His R shoulder pain was treated with oral narcotics, topical ointment, application of heat and cold,  and NSAIDs once renal indices permitted. By day of discharge he felt that his shoulder pain had improved, he was able to move it more, increased ROJM with therapy and exercises provided. He otherwise remained NSR, afebrile, and hemodynamically stable. He did suffer from post op nausea, treated with multiple agents, this too improved by day of discharge. All incisions are healing. He has done well participating in PT and IS. He was slow to wean to RA but has done so successfully with neb and IPPBs. Serial CXR were monitored.   He was aggressively diuresed once his post op bump in Cr up to 1.69 began to trend down. He is discharged on 6 days of Lasix 40 mg daily and potassium. He was followed by [facility] per [facility] protocol. BRITTNEY will be re-evaluated post discharge by Dr Landon Corado. He is stable on RA. He is stable for discharge today and also cleared by Pulmonary for discharge. Consults: Cardiac Rehab and Pulmonary/Critical Care    Significant Diagnostic Studies: labs  HgbA1C: 4.9  microbiology  radiology  cardiac graphics: Telemetry and ECG    Treatments:   Cardiac Meds: metoprolol, furosemide and amiodarone  Anticoagulation: ASA    IV Hydration  Antibiotics: perioperative  Analgesia  Insulin: Regular - Sliding Scale  Respiratory Therapy: O2 and albuterol/atropine nebulizer  Therapies: PT and OT  Surgery: as above    Discharge Exam:  Vitals:     04/24/17 2108 04/24/17 2242 04/25/17 0230 04/25/17 0700   BP: 129/56 101/53 115/70 124/64   Pulse: 71 66 72 74   Resp:   16 16 16   Temp:   97.9 °F (36.6 °C) 97.4 °F (36.3 °C) 97.6 °F (36.4 °C)   SpO2:   95% 93% 93%   Weight:     271 lb 6.4 oz (123.1 kg)     Height:                 Physical Exam:       GEN: well developed and in no acute distress,   HEENT:  PERRL, EOMI, no alar flaring or epistaxis, oral mucosa moist without cyanosis,   NECK:  no JVD, no retractions, no thyromegaly or masses,   LUNGS: poor effort, clear.   Incisions R axilla intact, healed/dry  CHEST: R axilla mini incisions CD  HEART:  RRR with no M,G,R;  ABDOMEN:  soft with no tenderness, positive bowel sounds present  EXTREMITIES:  warm with no cyanosis, mod edema  SKIN:  no jaundice or ecchymosis   NEURO:  alert and oriented, grossly non-focal     CHEST XRAY:   LAB:      Recent Labs      04/23/17  0505   WBC 12.6*   HGB 11.4*   HCT 33.3*   *            Recent Labs      04/25/17  0515 04/24/17  0440 04/23/17  0505    140 143   K 3.8 3.6 4.4    101 104   GLU 78 82 109*   CO2 32 35* 32   BUN 14 19 13   CREA 1.05 1.22 1.27   MG 2.1 2.1 2.3          Disposition: Home Health Care [facility]     Patient Instructions:   Discharge Medication List as of 4/25/2017  1:30 PM      START taking these medications    Details   guaiFENesin ER (MUCINEX) 1,200 mg Ta12 ER tablet Over the Counter Mucinex- take to keep secretions thin and easy to cough up, No Print, Disp-30 Tab, R-0      ibuprofen (MOTRIN) 800 mg tablet Take 1 Tab by mouth four (4) times daily for 14 days. , Print, Disp-56 Tab, R-0      methyl salicylate-menthol (BENGAY) 15-10 % topical cream Over the counter BenGay use prn, No Print, Disp-1 Tube, R-0      metoprolol tartrate (LOPRESSOR) 25 mg tablet Take 1 Tab by mouth every twelve (12) hours. , Print, Disp-60 Tab, R-5      tapentadol (NUCYNTA) 100 mg tablet Take 75 mg by mouth every six (6) hours as needed for Pain. Max Daily Amount: 300 mg., Print, Disp-60 Tab, R-0         CONTINUE these medications which have CHANGED    Details   potassium chloride (K-DUR, KLOR-CON) 20 mEq tablet Take 1 Tab by mouth daily for 6 days. , Print, Disp-6 Tab, R-0      Lasix 40 mg po                   Take 1 tab by mouth for 6 days, Disp 6, R-O     CONTINUE these medications which have NOT CHANGED    Details   loratadine (CLARITIN) 10 mg tablet Take 10 mg by mouth daily as needed for Allergies. , Historical Med      AZELASTINE HCL (ASTEPRO NA) 2 Sprays by Nasal route daily. , Historical Med      aspirin delayed-release 81 mg tablet Take 81 mg by mouth every morning., Historical Med      omeprazole (PRILOSEC) 40 mg capsule Take 40 mg by mouth every morning., Historical Med      tadalafil (CIALIS) 5 mg tablet Take 1 Tab by mouth daily. , Normal, Disp-30 Tab, R-11      acyclovir (ZOVIRAX) 200 mg capsule Take 200 mg by mouth daily. , Historical Med         STOP taking these medications       traMADol (ULTRAM) 50 mg tablet Comments:   Reason for Stopping:         mupirocin calcium (BACTROBAN) 2 % nasal ointment Comments:   Reason for Stopping:         chlorhexidine (HIBICLENS) 4 % liquid Comments:   Reason for Stopping:         furosemide (LASIX) 40 mg tablet Comments:   Reason for Stopping:             Reference discharge instructions as provided by nursing for diet, labs, medications, activity, wound care and any outpatient referrals. Follow-up Appointments   Procedures    FOLLOW UP VISIT Appointment in: Other (Specify) 2 Months Sleep Study appointment with Dr Sarah Tiwari (only per his orders) Post op MVR/BRITTNEY     2 Months  Sleep Study appointment with Dr Sarah Tiwari (only per his orders)   Post op MVR/BRITTNEY     Standing Status:   Standing     Number of Occurrences:   1     Order Specific Question:   Appointment in     Answer: Other (Specify)    FOLLOW UP VISIT Appointment in: Other (Specify) Skyla Crum     Standing Status:   Standing     Number of Occurrences:   1     Standing Expiration Date:   4/26/2017     Order Specific Question:   Appointment in     Answer: Other (Specify)    FOLLOW UP VISIT Appointment in: [address] [facility]     Standing Status:   Standing     Number of Occurrences:   1     Standing Expiration Date:   4/26/2017     Order Specific Question:   Appointment in     Answer: One Week    FOLLOW UP VISIT Appointment in: Other (Specify) POA- R shoulder pain- acute on chronic - Patient to make appointment     POA- R shoulder pain- acute on chronic - Patient to make appointment     Standing Status:   Standing     Number of Occurrences:   1     Standing Expiration Date:   4/26/2017     Order Specific Question:   Appointment in     Answer:    Other (Specify)        Signed:  Josue Zhu NP  4/25/2017  11:00 AM

## 2017-04-25 NOTE — PROGRESS NOTES
Problem: Self Care Deficits Care Plan (Adult)  Goal: *Acute Goals and Plan of Care (Insert Text)  1. Donna Thornton will complete gentle UE home program independently to reduce stiffness/pain and increase independence with ADL. 2. Donna Thornton will complete upper body dressing with modified independence. 3. Donna Thornton will complete self-feeding/grooming/oral care using RUE and LUE with modified independence. 4. (NEW Goal, 4/25/2017) Goal to kinesiotape patients R UE to decrease pain within 7 days. Time frame: 4 - 7 visits       OCCUPATIONAL THERAPY: Daily Note and AM 4/25/2017  INPATIENT: Hospital Day: 5  Payor: BLUE CROSS / Plan: [place] [employer] [place] / Product Type: PPO /      NAME/AGE/GENDER: Donna Thornton is a 50 y.o. male           PRIMARY DIAGNOSIS:  Acute mitral insufficiency [I34.0] Mitral valve regurgitation Mitral valve regurgitation  Procedure(s) (LRB):  MITRAL VALVE REPAIR (MVR) MINIMALLY INVASIVE. CRYOABALATION OF INTERCOSTAL SPACES (N/A)  ESOPHAGEAL TRANS ECHOCARDIOGRAM (N/A)  4 Days Post-Op  ICD-10: Treatment Diagnosis:        · Pain in Right Shoulder (M25.511)  · Stiffness of Right Shoulder, Not elsewhere classified (M25.611)  · Generalized Muscle Weakness (M62.81)   Precautions/Allergies:        Cardiac, Amoxil [amoxicillin] and Codeine       ASSESSMENT:      Mr. Anitha Bradley referred for Kinsiotaping R shoulder due to pain and stiffness. Kinesiotape applied to R UE; I strip to support R Upper Traps, X strip for R rhomboid support, and I strip wrap around for overall support R shoulder. Patient reported immediate relief of pain from a 4 on pain scale to a 2. KT immediately alleviated patient's pain. Instructed patient and caregiver on wear time and precautions. Cont OT per tx plan. This section established at most recent assessment   PROBLEM LIST (Impairments causing functional limitations):  1. Decreased Strength  2.  Decreased Ambulation Ability/Technique  3. Decreased Activity Tolerance  4. Decreased Pacing Skills  5. Decreased Work Simplification/Energy Conservation Techniques  6. Increased Fatigue  7. Increased Shortness of Breath  8. Edema/Girth    INTERVENTIONS PLANNED: (Benefits and precautions of occupational therapy have been discussed with the patient.)  1. Activities of daily living training  2. Adaptive equipment training  3. Balance training  4. Clothing management  5. Cognitive training  6. Donning&doffing training  7. Hygiene training  8. Neuromuscular re-eduation  9. Re-evaluation  10. Theraputic activity  11. Theraputic exercise      TREATMENT PLAN: Frequency/Duration: Follow patient 3 x's/week to address above goals. Rehabilitation Potential For Stated Goals: GOOD      RECOMMENDED REHABILITATION/EQUIPMENT: (at time of discharge pending progress): Continue Skilled Therapy. OCCUPATIONAL PROFILE AND HISTORY:   History of Present Injury/Illness (Reason for Referral):  Refer to OT evaluation  Past Medical History/Comorbidities:   Mr. Bradley Savage  has a past medical history of Acute mitral insufficiency; Difficult intubation; GERD (gastroesophageal reflux disease); History of kidney stones (1982); History of kidney stones; History of [place] spotted fever (1983); Hypertension; Mitral regurgitation; Obesity (BMI 30-39.9) (04/17/2017); Pancreatic cyst; PONV (postoperative nausea and vomiting); Right shoulder pain (4/24/2017); Sleep apnea; and Thyroid nodule. Mr. Bradley Savage  has a past surgical history that includes urological (1983); heent; sinus surgery proc unlisted (2002); lap cholecystectomy (2009); and ercp.   Social History/Living Environment:   Home Environment: Private residence  # Steps to Enter: 3  Rails to Enter: Yes  Hand Rails : Bilateral  Wheelchair Ramp: No  One/Two Story Residence: Two story  # of Interior Steps: 12  Interior Rails: Left  Lift Chair Available: No  Living Alone: No  Support Systems: Spouse/Significant Other/Partner  Patient Expects to be Discharged to[de-identified] Private residence  Current DME Used/Available at Home: Blood pressure cuff  Tub or Shower Type: Shower  Prior Level of Function/Work/Activity:         Number of Personal Factors/Comorbidities that affect the Plan of Care:     ASSESSMENT OF OCCUPATIONAL PERFORMANCE[de-identified]   Activities of Daily Living:           Basic ADLs (From Assessment) Complex ADLs (From Assessment)   Basic ADL  Feeding: Stand-by assistance  Oral Facial Hygiene/Grooming: Stand-by assistance  Bathing: Stand-by assistance  Upper Body Dressing: Stand-by assistance  Lower Body Dressing: Stand-by assistance  Toileting: Stand by assistance     Grooming/Bathing/Dressing Activities of Daily Living                                        Most Recent Physical Functioning:   Gross Assessment:                  Posture:  Posture (WDL): Within defined limits  Posture Assessment: Asymmetry (comment) (R UE in sling)  Balance:    Bed Mobility:     Wheelchair Mobility:     Transfers:                       Patient Vitals for the past 6 hrs:   BP BP Patient Position SpO2 O2 Flow Rate (L/min) Pulse   04/25/17 0700 124/64 At rest;Head of bed elevated (Comment degrees) 93 % 2 l/min 74   04/25/17 0855 - - 95 % 2 l/min -   04/25/17 1131 104/55 At rest;Sitting 92 % - 68        Mental Status  Neurologic State: Alert  Orientation Level: Oriented X4  Cognition: Follows commands  Perception: Appears intact  Perseveration: No perseveration noted  Safety/Judgement: Awareness of environment                               1.  1.  1.                  ·           TREATMENT:   .Neuromuscular Re-education: ( 25 minutes):  Exercise/activities per grid below to improve proprioception and pain. Required minimal verbal and tactile cues to for positioning while taping.                     Pre-treatment Symptoms/Complaints:  R shoulder pain  Pain: Initial: 4  Pain Intensity 1: 2 (after KT R UE)  Post Session:  2 after KT R UE Braces/Orthotics/Lines/Etc:   · IV  · O2 Device: Room air  Treatment/Session Assessment:    · Response to Treatment:  Patient responded well to treatment. · Interdisciplinary Collaboration:  · Physical Therapy Assistant  · Nurse Practitioner  · After treatment position/precautions:  · Up in chair  · Bed/Chair-wheels locked  · Bed in low position  · Call light within reach  · Family at bedside  · Compliance with Program/Exercises: compliant all of the time. · Recommendations/Intent for next treatment session: \"Next visit will focus on advancements to more challenging activities\".   Total Treatment Duration:  OT Patient Time In/Time Out  Time In: 1146  Time Out: [address], OT

## 2017-04-25 NOTE — DISCHARGE INSTRUCTIONS
DISCHARGE SUMMARY from Nurse    The following personal items are in your possession at time of discharge:    Dental Appliances: None  Visual Aid: Glasses     Home Medications: None  Jewelry: None  Clothing: None  Other Valuables: None             PATIENT INSTRUCTIONS:    After general anesthesia or intravenous sedation, for 24 hours or while taking prescription Narcotics:  · Limit your activities  · Do not drive and operate hazardous machinery  · Do not make important personal or business decisions  · Do  not drink alcoholic beverages  · If you have not urinated within 8 hours after discharge, please contact your surgeon on call. Report the following to your surgeon:  · Excessive pain, swelling, redness or odor of or around the surgical area  · Temperature over 100.5  · Nausea and vomiting lasting longer than 4 hours or if unable to take medications  · Any signs of decreased circulation or nerve impairment to extremity: change in color, persistent  numbness, tingling, coldness or increase pain  · Any questions      *  Please give a list of your current medications to your Primary Care Provider. *  Please update this list whenever your medications are discontinued, doses are      changed, or new medications (including over-the-counter products) are added. *  Please carry medication information at all times in case of emergency situations. These are general instructions for a healthy lifestyle:    No smoking/ No tobacco products/ Avoid exposure to second hand smoke    Surgeon General's Warning:  Quitting smoking now greatly reduces serious risk to your health.     Obesity, smoking, and sedentary lifestyle greatly increases your risk for illness    A healthy diet, regular physical exercise & weight monitoring are important for maintaining a healthy lifestyle    You may be retaining fluid if you have a history of heart failure or if you experience any of the following symptoms:  Weight gain of 3 pounds or more overnight or 5 pounds in a week, increased swelling in our hands or feet or shortness of breath while lying flat in bed. Please call your doctor as soon as you notice any of these symptoms; do not wait until your next office visit. Recognize signs and symptoms of STROKE:    F-face looks uneven    A-arms unable to move or move unevenly    S-speech slurred or non-existent    T-time-call 911 as soon as signs and symptoms begin-DO NOT go       Back to bed or wait to see if you get better-TIME IS BRAIN. Warning Signs of HEART ATTACK     Call 911 if you have these symptoms:   Chest discomfort. Most heart attacks involve discomfort in the center of the chest that lasts more than a few minutes, or that goes away and comes back. It can feel like uncomfortable pressure, squeezing, fullness, or pain.  Discomfort in other areas of the upper body. Symptoms can include pain or discomfort in one or both arms, the back, neck, jaw, or stomach.  Shortness of breath with or without chest discomfort.  Other signs may include breaking out in a cold sweat, nausea, or lightheadedness. Don't wait more than five minutes to call 911 - MINUTES MATTER! Fast action can save your life. Calling 911 is almost always the fastest way to get lifesaving treatment. Emergency Medical Services staff can begin treatment when they arrive -- up to an hour sooner than if someone gets to the hospital by car. The discharge information has been reviewed with the patient and spouse. The patient and spouse verbalized understanding. Discharge medications reviewed with the patient and spouse and appropriate educational materials and side effects teaching were provided.

## 2017-04-25 NOTE — PROGRESS NOTES
Critical Care Daily Progress Note: 4/25/2017  Admission Date: 4/21/2017    SURGICAL  Procedure(s):  MITRAL VALVE REPAIR (MVR) MINIMALLY INVASIVE. CRYOABALATION OF INTERCOSTAL SPACES  ESOPHAGEAL TRANS ECHOCARDIOGRAM --- 4 Days Post-Op :  4/21/2017    Chart Reviewed.   Subjective:     R shoulder pain getting better - OT consult yesterday  Pain control marginal in R shoulder, nausea continues  Still on oxygen, still asking for assistance getting to BR/getting out of chair, IS not within reach    Objective:     Current Facility-Administered Medications   Medication Dose Route Frequency    albuterol (PROVENTIL VENTOLIN) nebulizer solution 2.5 mg  2.5 mg Nebulization Q6H RT    furosemide (LASIX) injection 40 mg  40 mg IntraVENous ONCE    traMADol (ULTRAM) tablet 100 mg  100 mg Oral Q6H PRN    ibuprofen (MOTRIN) tablet 800 mg  800 mg Oral QID    guaiFENesin ER (MUCINEX) tablet 1,200 mg  1,200 mg Oral Q12H    mupirocin (BACTROBAN) 2 % ointment   Both Nostrils Q12H    tapentadol (NUCYNTA) tablet 100 mg  100 mg Oral Q4H PRN    senna-docusate (PERICOLACE) 8.6-50 mg per tablet 2 Tab  2 Tab Oral BID    bisacodyl (DULCOLAX) tablet 10 mg  10 mg Oral DAILY PRN    simethicone (MYLICON) tablet 80 mg  80 mg Oral QID PRN    acyclovir (ZOVIRAX) capsule 200 mg  200 mg Oral DAILY    sodium chloride (NS) flush 5-10 mL  5-10 mL IntraVENous Q8H    sodium chloride (NS) flush 5-10 mL  5-10 mL IntraVENous PRN    alum-mag hydroxide-simeth (MYLANTA) oral suspension 30 mL  30 mL Oral Q4H PRN    acetaminophen (TYLENOL) tablet 650 mg  650 mg Oral Q4H PRN    zolpidem (AMBIEN) tablet 5 mg  5 mg Oral QHS PRN    aspirin delayed-release tablet 81 mg  81 mg Oral DAILY    magnesium oxide (MAG-OX) tablet 400 mg  400 mg Oral TID PRN    magnesium oxide (MAG-OX) tablet 400 mg  400 mg Oral QID PRN    potassium chloride (K-DUR, KLOR-CON) SR tablet 20 mEq  20 mEq Oral BID PRN    potassium chloride (K-DUR, KLOR-CON) SR tablet 40 mEq  40 mEq Oral BID PRN    ondansetron (ZOFRAN) injection 4 mg  4 mg IntraVENous Q4H PRN    famotidine (PEPCID) tablet 20 mg  20 mg Oral W64F    methyl salicylate-menthol (BENGAY) 15-10 % cream   Topical Q12H    amiodarone (CORDARONE) tablet 200 mg  200 mg Oral Q12H    metoprolol tartrate (LOPRESSOR) tablet 25 mg  25 mg Oral Q12H    traMADol (ULTRAM) tablet 50 mg  50 mg Oral Q6H PRN    HYDROmorphone (PF) (DILAUDID) injection 1 mg  1 mg IntraVENous Q4H PRN     Vitals:    04/24/17 2108 04/24/17 2242 04/25/17 0230 04/25/17 0700   BP: 129/56 101/53 115/70 124/64   Pulse: 71 66 72 74   Resp:  16 16 16   Temp:  97.9 °F (36.6 °C) 97.4 °F (36.3 °C) 97.6 °F (36.4 °C)   SpO2:  95% 93% 93%   Weight:   271 lb 6.4 oz (123.1 kg)    Height:           Physical Exam:            GEN: well developed and in no acute distress,   HEENT:  PERRL, EOMI, no alar flaring or epistaxis, oral mucosa moist without cyanosis,   NECK:  no JVD, no retractions, no thyromegaly or masses,   LUNGS:  poor effort, clear, NC 2L  CHEST: R axilla mini incisions CD  HEART:  RRR with no M,G,R;  ABDOMEN:  soft with no tenderness, positive bowel sounds present  EXTREMITIES:  warm with no cyanosis, mod edema  SKIN:  no jaundice or ecchymosis   NEURO:  alert and oriented, grossly non-focal    CHEST XRAY:   LAB:  Recent Labs      04/23/17   0505   WBC  12.6*   HGB  11.4*   HCT  33.3*   PLT  124*     Recent Labs      04/25/17   0515  04/24/17   0440  04/23/17   0505   NA  141  140  143   K  3.8  3.6  4.4   CL  102  101  104   GLU  78  82  109*   CO2  32  35*  32   BUN  14  19  13   CREA  1.05  1.22  1.27   MG  2.1  2.1  2.3     No results for input(s): PH, PCO2, PO2, HCO3 in the last 72 hours. Assessment:     Principal Problem:    Mitral valve regurgitation (4/21/2017)      Overview: 4/21/17 (Dr Yarely Cordero) 1. Minimally invasive mitral valve repair with P2       resection and       32 mm Physio II annuloplasty ring.        2. Cryo intercostal nerve block for 5 segments. Active Problems:    Essential hypertension (3/7/2017)      Shortness of breath (3/7/2017)      BRITTNEY (obstructive sleep apnea) (3/7/2017)      GERD (gastroesophageal reflux disease) (3/7/2017)      Hypoxemia (4/22/2017)      Thyroid nodule (4/23/2017)      Overview: On US carotid 4/17      Right shoulder pain (4/24/2017)      Overview: Acute on chronic (old injury)        Plan:     POD 4 - Mini Mitral  R shoulder pain- acute on chronic (old) -increased NSAID dose yesterday - Cr normalized- continue- offered Ortho consult while here   Pain management- difficulty finding right combo  Pulm  IPPB's, weaning oxygen- off oxygen with good Sats with ambulation  NS  Home - possibly later today per Dr Melecio Reed review- patient and Spouse feel ready.   Cleared by Pulmonary for DC today     Ernie Blood NP

## 2017-04-25 NOTE — PROGRESS NOTES
Cardiac rehab: chart reviewed, appropriate for outpatient cardiac rehab. Patient qualifies for cardiac rehab with: [facility]. Discussion of benefits of outpatient cardiac rehab provided and time allowed for questions. Education topics included:  Ambulation goals, continued use of incentive spirometer, incision care, infection prevention,hand hygiene; discussed exercise, diet, and lifestyle changes to improve heart health. Pt is interested in participating at [address] [facility] rehab. Pt will be contacted following discharge to schedule orientation. Cardiologist is Dr. Jacinto Black.

## 2017-04-25 NOTE — PROGRESS NOTES
Attempt to wean O2. Oxygen removed and sat dropped to 88%. Instructed to use IS and sat increased to 95%. Will leave off O2 and monitor with spot checks.

## 2017-04-25 NOTE — PROGRESS NOTES
Problem: Mobility Impaired (Adult and Pediatric)  Goal: *Acute Goals and Plan of Care (Insert Text)  LTG:  (1.)Mr. Margret Knott will move from supine to sit and sit to supine , scoot up and down and roll side to side in bed with INDEPENDENCE within 7 day(s). (2.)Mr. Margret Knott will transfer from bed to chair and chair to bed with INDEPENDENCE using the least restrictive device within 7 day(s). (3.)Mr. Blanton will ambulate with INDEPENDENCE for >or= 500 feet with the least restrictive device, appropriate gait pattern and good dynamic standing balance within 7 day(s). (4.)Mr. Margret Knott will perform B LE therapeutic exercises x 20 reps with INDEPENDENCE within 7 days to increase strength for improved safety and independence in transfers and gait. (5.)Mr. Margret Knott will ascend/descend 4 steps with 1 handrail(s) and INDEPENDENCE within 7 day(s) for safe entry/exit of home.  ________________________________________________________________________________________________      PHYSICAL THERAPY: Daily Note, Treatment Day: 3rd and AM 4/25/2017  INPATIENT: Hospital Day: 5  Payor: BLUE CROSS / Plan: [facility], 1928–1983 / Product Type: PPO /      NAME/AGE/GENDER: Luis Manuel Mathias is a 50 y.o. male           PRIMARY DIAGNOSIS: Acute mitral insufficiency [I34.0] Mitral valve regurgitation Mitral valve regurgitation  Procedure(s) (LRB):  MITRAL VALVE REPAIR (MVR) MINIMALLY INVASIVE. CRYOABALATION OF INTERCOSTAL SPACES (N/A)  ESOPHAGEAL TRANS ECHOCARDIOGRAM (N/A)  4 Days Post-Op  ICD-10: Treatment Diagnosis:       · Generalized Muscle Weakness (M62.81)  · Difficulty in walking, Not elsewhere classified (R26.2)   Precaution/Allergies:  Amoxil [amoxicillin] and Codeine       ASSESSMENT:      Mr. Margret Knott presents sitting up in bedside chair with spouse present. HE is agreeable to treatment. He increased his ambulation distance this treatment and he was able to ascend/descend 3 steps with Supervision and verbal cues. Gave patient a home exercise program consisting of standing and seated exercises. He demonstrated good technique with all exercises. Good progress with mobility noted. This section established at most recent assessment   PROBLEM LIST (Impairments causing functional limitations):  1. Decreased Strength  2. Decreased ADL/Functional Activities  3. Decreased Transfer Abilities  4. Decreased Ambulation Ability/Technique  5. Decreased Balance  6. Increased Pain  7. Decreased Activity Tolerance  8. Decreased Flexibility/Joint Mobility  9. Decreased [place] with Home Exercise Program    INTERVENTIONS PLANNED: (Benefits and precautions of physical therapy have been discussed with the patient.)  1. Balance Exercise  2. Bed Mobility  3. Family Education  4. Gait Training  5. Home Exercise Program (HEP)  6. Therapeutic Activites  7. Therapeutic Exercise/Strengthening  8. Transfer Training  9. Group Therapy      TREATMENT PLAN: Frequency/Duration: twice daily for duration of hospital stay  Rehabilitation Potential For Stated Goals: GOOD      RECOMMENDED REHABILITATION/EQUIPMENT: (at time of discharge pending progress): Continue Skilled Therapy. HISTORY:   History of Present Injury/Illness (Reason for Referral):  Per chart: Mr. Tequila Porter is a very pleasant 43-year-old gentleman   who has had a several year history of worsening exertional   dyspnea. It became so profound that he presented to the   emergency room. An echo was performed which showed severe mitral   regurgitation with flail P2 segment. Transesophageal echo   confirmed this. Left heart catheterization showed clean   coronaries. Past Medical History/Comorbidities:   Mr. Tequila Porter  has a past medical history of Acute mitral insufficiency; Difficult intubation; GERD (gastroesophageal reflux disease); History of kidney stones (1982); History of kidney stones; History of [place] spotted fever (1983);  Hypertension; Mitral regurgitation; Obesity (BMI 30-39.9) (04/17/2017); Pancreatic cyst; PONV (postoperative nausea and vomiting); Right shoulder pain (4/24/2017); Sleep apnea; and Thyroid nodule. Mr. Alize Hay  has a past surgical history that includes urological (1983); heent; sinus surgery proc unlisted (2002); lap cholecystectomy (2009); and ercp. Social History/Living Environment:   Home Environment: Private residence  # Steps to Enter: 3  Rails to Enter: Yes  Hand Rails : Bilateral  Wheelchair Ramp: No  One/Two Story Residence: Two story  # of Interior Steps: 12  Interior Rails: Left  Lift Chair Available: No  Living Alone: No  Support Systems: Spouse/Significant Other/Partner  Patient Expects to be Discharged to[de-identified] Private residence  Current DME Used/Available at Home: Blood pressure cuff  Tub or Shower Type: Shower  Prior Level of Function/Work/Activity:  Pt lives with wife. Works. Drives. No falls. Independent with all functional mobility and gait without assistive device. Independent with all ADLs. Number of Personal Factors/Comorbidities that affect the Plan of Care: 1-2: MODERATE COMPLEXITY   EXAMINATION:   Most Recent Physical Functioning:   Gross Assessment:                  Posture:  Posture (WDL): Within defined limits  Balance:  Sitting: Intact  Sitting - Static: Good (unsupported)  Sitting - Dynamic: Good (unsupported)  Standing: Intact  Standing - Static: Good  Standing - Dynamic : Good Bed Mobility:     Wheelchair Mobility:     Transfers:  Sit to Stand: Independent  Stand to Sit: Independent  Gait:     Base of Support: Widened  Speed/Tracey: Slow  Gait Abnormalities: Decreased step clearance  Distance (ft): 350 Feet (ft)  Ambulation - Level of Assistance: Stand-by asssistance  Number of Stairs Trained: 3 (x 2)  Stairs - Level of Assistance: Supervision  Rail Use: Both       Body Structures Involved:  1. Heart  2. Joints  3. Muscles Body Functions Affected:  1. Cardio  2. Neuromusculoskeletal  3.  Movement Related Activities and Participation Affected:  1. General Tasks and Demands  2. Mobility  3. Self Care  4. Domestic Life   Number of elements that affect the Plan of Care: 4+: HIGH COMPLEXITY   CLINICAL PRESENTATION:   Presentation: Evolving clinical presentation with changing clinical characteristics: MODERATE COMPLEXITY   CLINICAL DECISION MAKIN [facility] Mobility Inpatient Short Form  How much difficulty does the patient currently have. .. Unable A Lot A Little None   1. Turning over in bed (including adjusting bedclothes, sheets and blankets)? [ ] 1   [ ] 2   [X] 3   [ ] 4   2. Sitting down on and standing up from a chair with arms ( e.g., wheelchair, bedside commode, etc.)   [ ] 1   [ ] 2   [X] 3   [ ] 4   3. Moving from lying on back to sitting on the side of the bed? [ ] 1   [ ] 2   [X] 3   [ ] 4   How much help from another person does the patient currently need. .. Total A Lot A Little None   4. Moving to and from a bed to a chair (including a wheelchair)? [ ] 1   [ ] 2   [X] 3   [ ] 4   5. Need to walk in hospital room? [ ] 1   [ ] 2   [X] 3   [ ] 4   6. Climbing 3-5 steps with a railing? [ ] 1   [ ] 2   [X] 3   [ ] 4   © , Trustees of [address] [place] [address], under license to [employer]. All rights reserved    Score:  Initial: 18 Most Recent: X (Date: -- )     Interpretation of Tool:  Represents activities that are increasingly more difficult (i.e. Bed mobility, Transfers, Gait).        Score 24 23 22-20 19-15 14-10 9-7 6       Modifier CH CI CJ CK CL CM CN         · Mobility - Walking and Moving Around:               - CURRENT STATUS:    CK - 40%-59% impaired, limited or restricted               - GOAL STATUS:           CK - 40%-59% impaired, limited or restricted               - D/C STATUS:                       ---------------To be determined---------------  Payor: BLUE CROSS / Plan: [place] BLUE [employer] [facility] / Product Type: PPO /       Medical Necessity:     · Patient demonstrates good rehab potential due to higher previous functional level. Reason for Services/Other Comments:  · Patient continues to require present interventions due to patient's inability to function at baseline. Use of outcome tool(s) and clinical judgement create a POC that gives a: Questionable prediction of patient's progress: MODERATE COMPLEXITY                 TREATMENT:   (In addition to Assessment/Re-Assessment sessions the following treatments were rendered)   Pre-treatment Symptoms/Complaints:  \"I am doing okay. \"  Pain: Initial:   Pain Intensity 1:  (Some paint with shoulder movement but not rated)  Post Session: No pain complaints noted. Therapeutic Activity: (    13 minutes): Therapeutic activities including Chair transfers, stair negotiation, Ambulation on level ground, toileting and dynamic standing balance activites to improve mobility, strength, balance and coordination. Required minimal verbal cues   to promote safety and awareness of environment by opening eyes and to promote deep breathing. Cues provided to increase step length in gait. Therapeutic Exercise: (12 Minutes):  Exercises per grid below to improve mobility, strength and activity tolerance. Required minimal verbal cues to promote proper body breathing techniques. Progressed repetitions and complexity of movement as indicated.        Date:  4-24-17 Date:  4-25-17 Date:     ACTIVITY/EXERCISE AM PM AM PM AM PM   Ambulation:           Distance  Device  Duration         Seated Heel Raises x20 x20       Seated Toe Raises x20 x20       Seated Long Arc Quads x10 x10       Seated Marching x10 x10       Seated Hip Abduction x10        Standing heel raises   x10      Standing toe raises   x10      Standing mini squats   x5      Standing hip abduction   x10      Standing marching   x10                        B = bilateral; AA = active assistive; A = active; P = passive      Braces/Orthotics/Lines/Etc: · None  Treatment/Session Assessment:    · Response to Treatment:  Patient tolerated treatment well. · Interdisciplinary Collaboration:  · Physical Therapy Assistant and Registered Nurse  · After treatment position/precautions:  · Up in chair, Bed/Chair-wheels locked, Call light within reach, RN notified and Family at bedside  · Compliance with Program/Exercises: Will assess as treatment progresses. · Recommendations/Intent for next treatment session: \"Next visit will focus on advancements to more challenging activities and reduction in assistance provided\".   Total Treatment Duration:  PT Patient Time In/Time Out  Time In: 1015  Time Out: Saiad 108, PTA

## 2017-04-26 ENCOUNTER — HOME CARE VISIT (OUTPATIENT)
Dept: HOME HEALTH SERVICES | Facility: HOME HEALTH | Age: 52
End: 2017-04-26

## 2017-04-27 ENCOUNTER — APPOINTMENT (OUTPATIENT)
Dept: GENERAL RADIOLOGY | Age: 52
DRG: 439 | End: 2017-04-27
Attending: EMERGENCY MEDICINE
Payer: COMMERCIAL

## 2017-04-27 ENCOUNTER — HOSPITAL ENCOUNTER (INPATIENT)
Age: 52
LOS: 3 days | Discharge: HOME | DRG: 439 | End: 2017-04-30
Attending: EMERGENCY MEDICINE | Admitting: INTERNAL MEDICINE
Payer: COMMERCIAL

## 2017-04-27 ENCOUNTER — APPOINTMENT (OUTPATIENT)
Dept: CT IMAGING | Age: 52
DRG: 439 | End: 2017-04-27
Attending: EMERGENCY MEDICINE
Payer: COMMERCIAL

## 2017-04-27 ENCOUNTER — HOME CARE VISIT (OUTPATIENT)
Dept: SCHEDULING | Facility: HOME HEALTH | Age: 52
End: 2017-04-27

## 2017-04-27 ENCOUNTER — HOME CARE VISIT (OUTPATIENT)
Dept: HOME HEALTH SERVICES | Facility: HOME HEALTH | Age: 52
End: 2017-04-27

## 2017-04-27 DIAGNOSIS — R07.9 CHEST PAIN, UNSPECIFIED TYPE: ICD-10-CM

## 2017-04-27 DIAGNOSIS — J90 PLEURAL EFFUSION: ICD-10-CM

## 2017-04-27 DIAGNOSIS — R77.8 ELEVATED TROPONIN: ICD-10-CM

## 2017-04-27 DIAGNOSIS — K85.90 ACUTE PANCREATITIS, UNSPECIFIED COMPLICATION STATUS, UNSPECIFIED PANCREATITIS TYPE: Primary | ICD-10-CM

## 2017-04-27 DIAGNOSIS — J98.11 ATELECTASIS OF BOTH LUNGS: ICD-10-CM

## 2017-04-27 DIAGNOSIS — J98.11 ATELECTASIS: ICD-10-CM

## 2017-04-27 DIAGNOSIS — G47.33 OSA (OBSTRUCTIVE SLEEP APNEA): Chronic | ICD-10-CM

## 2017-04-27 LAB
ALBUMIN SERPL BCP-MCNC: 3.1 G/DL (ref 3.5–5)
ALBUMIN/GLOB SERPL: 0.9 {RATIO} (ref 1.2–3.5)
ALP SERPL-CCNC: 56 U/L (ref 50–136)
ALT SERPL-CCNC: 79 U/L (ref 12–65)
ANION GAP BLD CALC-SCNC: 9 MMOL/L (ref 7–16)
AST SERPL W P-5'-P-CCNC: 63 U/L (ref 15–37)
ATRIAL RATE: 78 BPM
BASOPHILS # BLD AUTO: 0 K/UL (ref 0–0.2)
BASOPHILS # BLD: 0 % (ref 0–2)
BILIRUB SERPL-MCNC: 0.9 MG/DL (ref 0.2–1.1)
BUN SERPL-MCNC: 11 MG/DL (ref 6–23)
CALCIUM SERPL-MCNC: 8.5 MG/DL (ref 8.3–10.4)
CALCULATED P AXIS, ECG09: 37 DEGREES
CALCULATED R AXIS, ECG10: -8 DEGREES
CALCULATED T AXIS, ECG11: 37 DEGREES
CHLORIDE SERPL-SCNC: 105 MMOL/L (ref 98–107)
CO2 SERPL-SCNC: 28 MMOL/L (ref 21–32)
CREAT SERPL-MCNC: 1.21 MG/DL (ref 0.8–1.5)
DIAGNOSIS, 93000: NORMAL
DIFFERENTIAL METHOD BLD: ABNORMAL
EOSINOPHIL # BLD: 0.3 K/UL (ref 0–0.8)
EOSINOPHIL NFR BLD: 4 % (ref 0.5–7.8)
ERYTHROCYTE [DISTWIDTH] IN BLOOD BY AUTOMATED COUNT: 13.7 % (ref 11.9–14.6)
GLOBULIN SER CALC-MCNC: 3.6 G/DL (ref 2.3–3.5)
GLUCOSE SERPL-MCNC: 83 MG/DL (ref 65–100)
HCT VFR BLD AUTO: 32.4 % (ref 41.1–50.3)
HGB BLD-MCNC: 11.7 G/DL (ref 13.6–17.2)
IMM GRANULOCYTES # BLD: 0.2 K/UL (ref 0–0.5)
IMM GRANULOCYTES NFR BLD AUTO: 2.2 % (ref 0–5)
LACTATE BLD-SCNC: 1.4 MMOL/L (ref 0.5–1.9)
LIPASE SERPL-CCNC: 935 U/L (ref 73–393)
LYMPHOCYTES # BLD AUTO: 14 % (ref 13–44)
LYMPHOCYTES # BLD: 1.4 K/UL (ref 0.5–4.6)
MAGNESIUM SERPL-MCNC: 2.1 MG/DL (ref 1.8–2.4)
MCH RBC QN AUTO: 31.5 PG (ref 26.1–32.9)
MCHC RBC AUTO-ENTMCNC: 36.1 G/DL (ref 31.4–35)
MCV RBC AUTO: 87.3 FL (ref 79.6–97.8)
MONOCYTES # BLD: 0.7 K/UL (ref 0.1–1.3)
MONOCYTES NFR BLD AUTO: 8 % (ref 4–12)
NEUTS SEG # BLD: 6.7 K/UL (ref 1.7–8.2)
NEUTS SEG NFR BLD AUTO: 72 % (ref 43–78)
P-R INTERVAL, ECG05: 166 MS
PHOSPHATE SERPL-MCNC: 4.3 MG/DL (ref 2.5–4.5)
PLATELET # BLD AUTO: 276 K/UL (ref 150–450)
PMV BLD AUTO: 10.8 FL (ref 10.8–14.1)
POTASSIUM SERPL-SCNC: 4 MMOL/L (ref 3.5–5.1)
PROCALCITONIN SERPL-MCNC: 0.1 NG/ML
PROT SERPL-MCNC: 6.7 G/DL (ref 6.3–8.2)
Q-T INTERVAL, ECG07: 444 MS
QRS DURATION, ECG06: 94 MS
QTC CALCULATION (BEZET), ECG08: 506 MS
RBC # BLD AUTO: 3.71 M/UL (ref 4.23–5.67)
SODIUM SERPL-SCNC: 142 MMOL/L (ref 136–145)
TRIGL SERPL-MCNC: 155 MG/DL (ref 35–150)
TROPONIN I SERPL-MCNC: 0.15 NG/ML (ref 0.02–0.05)
TROPONIN I SERPL-MCNC: 0.17 NG/ML (ref 0.02–0.05)
VENTRICULAR RATE, ECG03: 78 BPM
WBC # BLD AUTO: 9.4 K/UL (ref 4.3–11.1)

## 2017-04-27 PROCEDURE — 85025 COMPLETE CBC W/AUTO DIFF WBC: CPT | Performed by: EMERGENCY MEDICINE

## 2017-04-27 PROCEDURE — 99223 1ST HOSP IP/OBS HIGH 75: CPT | Performed by: INTERNAL MEDICINE

## 2017-04-27 PROCEDURE — 84478 ASSAY OF TRIGLYCERIDES: CPT | Performed by: INTERNAL MEDICINE

## 2017-04-27 PROCEDURE — 84484 ASSAY OF TROPONIN QUANT: CPT | Performed by: EMERGENCY MEDICINE

## 2017-04-27 PROCEDURE — 83690 ASSAY OF LIPASE: CPT | Performed by: EMERGENCY MEDICINE

## 2017-04-27 PROCEDURE — 87040 BLOOD CULTURE FOR BACTERIA: CPT | Performed by: EMERGENCY MEDICINE

## 2017-04-27 PROCEDURE — 74011000258 HC RX REV CODE- 258: Performed by: EMERGENCY MEDICINE

## 2017-04-27 PROCEDURE — 36415 COLL VENOUS BLD VENIPUNCTURE: CPT | Performed by: INTERNAL MEDICINE

## 2017-04-27 PROCEDURE — 80053 COMPREHEN METABOLIC PANEL: CPT | Performed by: EMERGENCY MEDICINE

## 2017-04-27 PROCEDURE — 74011636320 HC RX REV CODE- 636/320: Performed by: EMERGENCY MEDICINE

## 2017-04-27 PROCEDURE — 74011250636 HC RX REV CODE- 250/636: Performed by: INTERNAL MEDICINE

## 2017-04-27 PROCEDURE — 74011250637 HC RX REV CODE- 250/637: Performed by: INTERNAL MEDICINE

## 2017-04-27 PROCEDURE — 93005 ELECTROCARDIOGRAM TRACING: CPT | Performed by: EMERGENCY MEDICINE

## 2017-04-27 PROCEDURE — 83735 ASSAY OF MAGNESIUM: CPT | Performed by: INTERNAL MEDICINE

## 2017-04-27 PROCEDURE — 84145 PROCALCITONIN (PCT): CPT | Performed by: EMERGENCY MEDICINE

## 2017-04-27 PROCEDURE — 71020 XR CHEST PA LAT: CPT

## 2017-04-27 PROCEDURE — 74011250637 HC RX REV CODE- 250/637: Performed by: NURSE PRACTITIONER

## 2017-04-27 PROCEDURE — 83605 ASSAY OF LACTIC ACID: CPT

## 2017-04-27 PROCEDURE — 65660000000 HC RM CCU STEPDOWN

## 2017-04-27 PROCEDURE — 71260 CT THORAX DX C+: CPT

## 2017-04-27 PROCEDURE — 84100 ASSAY OF PHOSPHORUS: CPT | Performed by: INTERNAL MEDICINE

## 2017-04-27 PROCEDURE — 99284 EMERGENCY DEPT VISIT MOD MDM: CPT | Performed by: EMERGENCY MEDICINE

## 2017-04-27 RX ORDER — SODIUM CHLORIDE 0.9 % (FLUSH) 0.9 %
5-10 SYRINGE (ML) INJECTION EVERY 8 HOURS
Status: DISCONTINUED | OUTPATIENT
Start: 2017-04-27 | End: 2017-04-30 | Stop reason: HOSPADM

## 2017-04-27 RX ORDER — ONDANSETRON 2 MG/ML
4 INJECTION INTRAMUSCULAR; INTRAVENOUS
Status: DISCONTINUED | OUTPATIENT
Start: 2017-04-27 | End: 2017-04-30 | Stop reason: HOSPADM

## 2017-04-27 RX ORDER — IBUPROFEN 400 MG/1
800 TABLET ORAL 4 TIMES DAILY
Status: DISCONTINUED | OUTPATIENT
Start: 2017-04-27 | End: 2017-04-28

## 2017-04-27 RX ORDER — SODIUM CHLORIDE 0.9 % (FLUSH) 0.9 %
5-10 SYRINGE (ML) INJECTION AS NEEDED
Status: DISCONTINUED | OUTPATIENT
Start: 2017-04-27 | End: 2017-04-30 | Stop reason: HOSPADM

## 2017-04-27 RX ORDER — METOPROLOL TARTRATE 25 MG/1
25 TABLET, FILM COATED ORAL EVERY 12 HOURS
Status: DISCONTINUED | OUTPATIENT
Start: 2017-04-27 | End: 2017-04-30 | Stop reason: HOSPADM

## 2017-04-27 RX ORDER — PANTOPRAZOLE SODIUM 40 MG/1
40 TABLET, DELAYED RELEASE ORAL
Status: DISCONTINUED | OUTPATIENT
Start: 2017-04-28 | End: 2017-04-28

## 2017-04-27 RX ORDER — NALOXONE HYDROCHLORIDE 0.4 MG/ML
0.4 INJECTION, SOLUTION INTRAMUSCULAR; INTRAVENOUS; SUBCUTANEOUS AS NEEDED
Status: DISCONTINUED | OUTPATIENT
Start: 2017-04-27 | End: 2017-04-30 | Stop reason: HOSPADM

## 2017-04-27 RX ORDER — ASPIRIN 81 MG/1
81 TABLET ORAL
Status: DISCONTINUED | OUTPATIENT
Start: 2017-04-28 | End: 2017-04-30 | Stop reason: HOSPADM

## 2017-04-27 RX ORDER — TRAMADOL HYDROCHLORIDE 50 MG/1
50 TABLET ORAL
Status: DISCONTINUED | OUTPATIENT
Start: 2017-04-27 | End: 2017-04-30 | Stop reason: HOSPADM

## 2017-04-27 RX ORDER — HYDROMORPHONE HYDROCHLORIDE 1 MG/ML
0.5 INJECTION, SOLUTION INTRAMUSCULAR; INTRAVENOUS; SUBCUTANEOUS
Status: DISCONTINUED | OUTPATIENT
Start: 2017-04-27 | End: 2017-04-27

## 2017-04-27 RX ORDER — TEMAZEPAM 15 MG/1
15 CAPSULE ORAL
Status: DISCONTINUED | OUTPATIENT
Start: 2017-04-27 | End: 2017-04-30 | Stop reason: HOSPADM

## 2017-04-27 RX ORDER — SODIUM CHLORIDE 0.9 % (FLUSH) 0.9 %
10 SYRINGE (ML) INJECTION
Status: COMPLETED | OUTPATIENT
Start: 2017-04-27 | End: 2017-04-27

## 2017-04-27 RX ORDER — SODIUM CHLORIDE, SODIUM LACTATE, POTASSIUM CHLORIDE, CALCIUM CHLORIDE 600; 310; 30; 20 MG/100ML; MG/100ML; MG/100ML; MG/100ML
150 INJECTION, SOLUTION INTRAVENOUS CONTINUOUS
Status: DISCONTINUED | OUTPATIENT
Start: 2017-04-27 | End: 2017-04-30 | Stop reason: HOSPADM

## 2017-04-27 RX ADMIN — METOPROLOL TARTRATE 25 MG: 25 TABLET ORAL at 20:59

## 2017-04-27 RX ADMIN — TEMAZEPAM 15 MG: 15 CAPSULE ORAL at 22:22

## 2017-04-27 RX ADMIN — SODIUM CHLORIDE 100 ML: 900 INJECTION, SOLUTION INTRAVENOUS at 16:58

## 2017-04-27 RX ADMIN — TRAMADOL HYDROCHLORIDE 50 MG: 50 TABLET, FILM COATED ORAL at 20:59

## 2017-04-27 RX ADMIN — Medication 10 ML: at 16:58

## 2017-04-27 RX ADMIN — MENTHOL AND METHYL SALICYLATE: 100; 150 CREAM TOPICAL at 20:08

## 2017-04-27 RX ADMIN — IBUPROFEN 800 MG: 400 TABLET, FILM COATED ORAL at 21:00

## 2017-04-27 RX ADMIN — IOVERSOL 100 ML: 741 INJECTION INTRA-ARTERIAL; INTRAVENOUS at 16:58

## 2017-04-27 RX ADMIN — ONDANSETRON 4 MG: 2 INJECTION INTRAMUSCULAR; INTRAVENOUS at 19:59

## 2017-04-27 RX ADMIN — SODIUM CHLORIDE, SODIUM LACTATE, POTASSIUM CHLORIDE, AND CALCIUM CHLORIDE 150 ML/HR: 600; 310; 30; 20 INJECTION, SOLUTION INTRAVENOUS at 20:08

## 2017-04-27 RX ADMIN — Medication: at 20:08

## 2017-04-27 NOTE — IP AVS SNAPSHOT
Current Discharge Medication List  
  
START taking these medications Dose & Instructions Dispensing Information Comments Morning Noon Evening Bedtime  
 oxyCODONE-acetaminophen 7.5-325 mg per tablet Commonly known as:  PERCOCET 7.5 Your last dose was: Your next dose is:    
   
   
 Dose:  1 Tab Take 1 Tab by mouth every six (6) hours as needed for Pain. Max Daily Amount: 4 Tabs. Indications: Pain Quantity:  20 Tab Refills:  0 CONTINUE these medications which have CHANGED Dose & Instructions Dispensing Information Comments Morning Noon Evening Bedtime  
 ibuprofen 800 mg tablet Commonly known as:  MOTRIN What changed:   
- when to take this 
- reasons to take this Your last dose was: Your next dose is:    
   
   
 Dose:  800 mg Take 1 Tab by mouth every six (6) hours as needed for Pain for up to 14 days. Quantity:  56 Tab Refills:  0 CONTINUE these medications which have NOT CHANGED Dose & Instructions Dispensing Information Comments Morning Noon Evening Bedtime  
 aspirin delayed-release 81 mg tablet Your last dose was: Your next dose is:    
   
   
 Dose:  81 mg Take 81 mg by mouth every morning. Refills:  0  
     
   
   
   
  
 methyl salicylate-menthol 13-00 % topical cream  
Commonly known as:  Glorietta Bieneadelaidack Your last dose was: Your next dose is:    
   
   
 Over the counter BenGay use prn Quantity:  1 Tube Refills:  0  
     
   
   
   
  
 metoprolol tartrate 25 mg tablet Commonly known as:  LOPRESSOR Your last dose was: Your next dose is:    
   
   
 Dose:  25 mg Take 1 Tab by mouth every twelve (12) hours. Quantity:  60 Tab Refills:  5  
     
   
   
   
  
 omeprazole 40 mg capsule Commonly known as:  PRILOSEC Your last dose was:     
   
Your next dose is:    
   
   
 Dose:  40 mg  
 Take 40 mg by mouth every morning. Refills:  0 STOP taking these medications   
 furosemide 40 mg tablet Commonly known as:  LASIX  
   
  
 potassium chloride 20 mEq tablet Commonly known as:  K-DUR, KLOR-CON  
   
  
 traMADol-acetaminophen 37.5-325 mg per tablet Commonly known as:  ULTRACET Where to Get Your Medications Information on where to get these meds will be given to you by the nurse or doctor. ! Ask your nurse or doctor about these medications  
  ibuprofen 800 mg tablet  
 oxyCODONE-acetaminophen 7.5-325 mg per tablet

## 2017-04-27 NOTE — PROGRESS NOTES
Initial skin assessment completed. Skin warm and dry. S/p heart cath to right groin - abrasion noted and area moist.  Incision s/p MVR under right breast - incision well approximated. Bruising noted around site. Multiple abrasions noted to abdomen. Two healing puncture sites to right chest wall from chest tubes. Sacrum assessed and WDL. Left great toe - recent break - toenail black. No other skin issues noted. No other issues noted.

## 2017-04-27 NOTE — ED PROVIDER NOTES
HPI Comments: Presents with complaint of chest pain and dyspnea on exertion. Patient states he hasn't felt well since he was discharged from the hospital.  He reports left-sided sharp pain. Nausea but no vomiting. He's had some lower extremity edema. He has recently hospitalized for an extended period for a mitral valve repair. Patient is a 50 y.o. male presenting with chest pain. The history is provided by the patient and the spouse. Chest Pain (Angina)    This is a new problem. The current episode started yesterday. The problem has been gradually worsening. The problem occurs constantly. The pain is associated with breathing. The pain is moderate. The quality of the pain is described as sharp. The pain radiates to the left shoulder. The symptoms are aggravated by deep breathing. Associated symptoms include cough, malaise/fatigue, nausea and shortness of breath. Pertinent negatives include no diaphoresis, no fever, no hemoptysis and no lower extremity edema. He has tried aspirin for the symptoms. Risk factors include cardiac disease.  Procedural history includes cardiac catheterization and echocardiogram.       Past Medical History:   Diagnosis Date    Acute mitral insufficiency     Difficult intubation     \"anterior larynx?  glide scope and blue bougie guide helpful per Dr Elena Salcedo ENT    GERD (gastroesophageal reflux disease)     daily med and HOB elevated 4\" and sleeps on 3 pillows    History of kidney stones 1982    History of kidney stones     History of Jean Mountain spotted fever 1983    Hypertension     no longer takes lisinopril, only Lasix    Mitral regurgitation     Obesity (BMI 30-39.9) 04/17/2017    BMI 37    Pancreatic cyst     PONV (postoperative nausea and vomiting)     with sinus sx    Right shoulder pain 4/24/2017    Acute on chronic (old injury)    Sleep apnea     Does not use CPAP    Thyroid nodule        Past Surgical History:   Procedure Laterality Date    CARDIAC SURG PROCEDURE UNLIST      mitral valve repair    HX ERCP      removal of pancreatic cyst-    HX HEENT      lymphnode removed from L neck    HX LAP CHOLECYSTECTOMY  2009    HX UROLOGICAL  1983    cystoto remove kidney stone    SINUS SURGERY PROC UNLISTED  2002         Family History:   Problem Relation Age of Onset    Adopted: Yes    Family history unknown: Yes       Social History     Social History    Marital status:      Spouse name: N/A    Number of children: N/A    Years of education: N/A     Occupational History    Not on file. Social History Main Topics    Smoking status: Never Smoker    Smokeless tobacco: Never Used    Alcohol use No    Drug use: No    Sexual activity: Not on file     Other Topics Concern    Not on file     Social History Narrative         ALLERGIES: Amoxil [amoxicillin] and Codeine    Review of Systems   Constitutional: Positive for malaise/fatigue. Negative for diaphoresis and fever. Respiratory: Positive for cough and shortness of breath. Negative for hemoptysis. Cardiovascular: Positive for chest pain. Gastrointestinal: Positive for nausea. All other systems reviewed and are negative. Vitals:    04/27/17 1405 04/27/17 1525 04/27/17 1555 04/27/17 1747   BP: 132/78 120/63 115/63 128/66   Pulse: 77 77 78    Resp: 18      Temp: 98.2 °F (36.8 °C)      SpO2: 98% 93% 98% 95%   Weight: 117.9 kg (260 lb)      Height: 5' 10\" (1.778 m)               Physical Exam   Constitutional: He is oriented to person, place, and time. He appears well-developed and well-nourished. No distress. HENT:   Head: Normocephalic and atraumatic. Neck: Normal range of motion. Neck supple. Cardiovascular: Normal rate and regular rhythm. Pulmonary/Chest: He is in respiratory distress. He has no wheezes. He exhibits tenderness. Abdominal: Soft. He exhibits no distension. There is no tenderness. There is no rebound and no guarding.    Musculoskeletal: Normal range of motion. He exhibits edema. Neurological: He is alert and oriented to person, place, and time. No cranial nerve deficit. Skin: Skin is warm and dry. No rash noted. He is not diaphoretic. No erythema. Psychiatric: He has a normal mood and affect. His behavior is normal.   Nursing note and vitals reviewed. MDM  Number of Diagnoses or Management Options  Acute pancreatitis, unspecified complication status, unspecified pancreatitis type: Atelectasis of both lungs:   Chest pain, unspecified type:   Elevated troponin:   Pleural effusion:   Diagnosis management comments: Patient had a CT PE study that was negative. His atelectasis bilaterally and was evaluated by Dr. Donald Rowell  And she requests that no  Antibiotics be given. Added sepsis workup. Pulmonary will follow him for his pleural effusion. Cardiology saw him for his elevated troponin and will follow him. He was admitted for acute pancreatitis and has a history of a pancreatic cyst is followed by GI. Refused to take any pain medicine.        Amount and/or Complexity of Data Reviewed  Clinical lab tests: ordered and reviewed  Decide to obtain previous medical records or to obtain history from someone other than the patient: yes (wife)  Review and summarize past medical records: yes (Recent surgery for mitral valve repair.)  Discuss the patient with other providers: yes  Independent visualization of images, tracings, or specimens: yes (Normal sinus rhythm prolonged QT and no change from previous)    Risk of Complications, Morbidity, and/or Mortality  Presenting problems: high  Diagnostic procedures: moderate  Management options: high    Patient Progress  Patient progress: stable    ED Course       Procedures

## 2017-04-27 NOTE — IP AVS SNAPSHOT
[employer] 
 
 
 [facility] 
[phone] Patient: Bishop Borges MRN: [id number] EMB:[id number] You are allergic to the following Allergen Reactions Amoxil (Amoxicillin) Other (comments) Upset stomach and diarrhea Codeine Nausea and Vomiting Dilaudid (Hydromorphone (Bulk)) Nausea and Vomiting Nucynta (Tapentadol) Other (comments) Confusion Recent Documentation Height Weight BMI Smoking Status 1.778 m 116.7 kg 36.9 kg/m2 Never Smoker Unresulted Labs Order Current Status CULTURE, BLOOD Preliminary result CULTURE, BLOOD Preliminary result Emergency Contacts Name Discharge Info Relation Home Work Mobile Ladonna Blanton  Spouse [3] [phone] About your hospitalization You were admitted on:  April 27, 2017 You last received care in the:  [facility] 8 MED SURG You were discharged on:  April 30, 2017 Unit phone number:  [phone] Why you were hospitalized Your primary diagnosis was:  Pancreatitis Your diagnoses also included:  Chest Pain, Pleural Effusion, Elevated Troponin, Tk (Obstructive Sleep Apnea), Atelectasis Providers Seen During Your Hospitalizations Provider Role Specialty Primary office phone Shailesh Mclaughlin MD Attending Provider Emergency Medicine [phone] Jaylen Layne DO Attending Provider Internal Medicine [phone] Your Primary Care Physician (PCP) Primary Care Physician Office Phone Office Fax [address] [place] [address] [phone] Follow-up Information Follow up With Details Comments Contact Info Sushila Valentino NP Schedule an appointment as soon as possible for a visit in 1 week call on Monday 5/1/17 for follow up appointment post hospital admission [address] [address] 
[phone] Thomas Keenan MD In 1 week call on Monday 5/1/17 for follow up appointment [address] [facility] [address] [facility] [address] 
[phone] Todd Hicks MD In 1 day follow with Dr. Corrina Echeverria as previously scheduled on 5/1/17 [address] [facility] [address] 
[phone] Scott Tobar MD Go to follow up with Dr. Jeronimo Patrick as previously scheduled on 5/17/17 [address] [facility] [address] [facility] CARDIOVASC THORACIC [address] [address] 
[phone] Your Appointments Monday May 01, 2017 11:15 AM EDT TRANSITIONAL CARE MANAGEMENT with Miracle Tena MD  
[facility] ([address] [facility]) [address] 
[address] [place] [address]  
[phone] Wednesday May 17, 2017  2:20 PM EDT Discharge Followup with Sctot Tobar MD  
[address] [facility] [address] [facility] [address]  
[phone] Current Discharge Medication List  
  
START taking these medications Dose & Instructions Dispensing Information Comments Morning Noon Evening Bedtime  
 oxyCODONE-acetaminophen 7.5-325 mg per tablet Commonly known as:  PERCOCET 7.5 Your last dose was: Your next dose is:    
   
   
 Dose:  1 Tab Take 1 Tab by mouth every six (6) hours as needed for Pain. Max Daily Amount: 4 Tabs. Indications: Pain Quantity:  20 Tab Refills:  0 CONTINUE these medications which have CHANGED Dose & Instructions Dispensing Information Comments Morning Noon Evening Bedtime  
 ibuprofen 800 mg tablet Commonly known as:  MOTRIN What changed:   
- when to take this 
- reasons to take this Your last dose was: Your next dose is:    
   
   
 Dose:  800 mg Take 1 Tab by mouth every six (6) hours as needed for Pain for up to 14 days. Quantity:  56 Tab Refills:  0 CONTINUE these medications which have NOT CHANGED Dose & Instructions Dispensing Information Comments Morning Noon Evening Bedtime  
 aspirin delayed-release 81 mg tablet Your last dose was: Your next dose is:    
   
   
 Dose:  81 mg Take 81 mg by mouth every morning. Refills:  0  
     
   
   
   
  
 methyl salicylate-menthol 51-28 % topical cream  
Commonly known as:  Alea Carbon Your last dose was: Your next dose is:    
   
   
 Over the counter BenGay use prn Quantity:  1 Tube Refills:  0  
     
   
   
   
  
 metoprolol tartrate 25 mg tablet Commonly known as:  LOPRESSOR Your last dose was: Your next dose is:    
   
   
 Dose:  25 mg Take 1 Tab by mouth every twelve (12) hours. Quantity:  60 Tab Refills:  5  
     
   
   
   
  
 omeprazole 40 mg capsule Commonly known as:  PRILOSEC Your last dose was: Your next dose is:    
   
   
 Dose:  40 mg Take 40 mg by mouth every morning. Refills:  0 STOP taking these medications   
 furosemide 40 mg tablet Commonly known as:  LASIX  
   
  
 potassium chloride 20 mEq tablet Commonly known as:  K-DUR, KLOR-CON  
   
  
 traMADol-acetaminophen 37.5-325 mg per tablet Commonly known as:  ULTRACET Where to Get Your Medications Information on where to get these meds will be given to you by the nurse or doctor. ! Ask your nurse or doctor about these medications  
  ibuprofen 800 mg tablet  
 oxyCODONE-acetaminophen 7.5-325 mg per tablet Discharge Instructions DISCHARGE SUMMARY from Nurse The following personal items are in your possession at time of discharge: 
 
Dental Appliances: None Visual Aid: Glasses Home Medications: Kept at bedside Jewelry: None Clothing: At bedside Other Valuables: None PATIENT INSTRUCTIONS: 
 
 
F-face looks uneven A-arms unable to move or move unevenly S-speech slurred or non-existent T-time-call 911 as soon as signs and symptoms begin-DO NOT go Back to bed or wait to see if you get better-TIME IS BRAIN. Warning Signs of HEART ATTACK Call 911 if you have these symptoms: 
? Chest discomfort. Most heart attacks involve discomfort in the center of the chest that lasts more than a few minutes, or that goes away and comes back. It can feel like uncomfortable pressure, squeezing, fullness, or pain. ? Discomfort in other areas of the upper body. Symptoms can include pain or discomfort in one or both arms, the back, neck, jaw, or stomach. ? Shortness of breath with or without chest discomfort. ? Other signs may include breaking out in a cold sweat, nausea, or lightheadedness. Don't wait more than five minutes to call [address]! Fast action can save your life. Calling 911 is almost always the fastest way to get lifesaving treatment. Emergency Medical Services staff can begin treatment when they arrive  up to an hour sooner than if someone gets to the hospital by car. The discharge information has been reviewed with the patient and spouse. The patient and spouse verbalized understanding. Discharge medications reviewed with the patient and spouse and appropriate educational materials and side effects teaching were provided. Discharge Orders None [facility] Announcement  We are excited to announce that we are making your provider's discharge notes available to you in [employer]. You will see these notes when they are completed and signed by the physician that discharged you from your recent hospital stay. If you have any questions or concerns about any information you see in [employer], please call the Health Information Department where you were seen or reach out to your Primary Care Provider for more information about your plan of care. Introducing [facility] SERVICES! Christian Kaba introduces [employer] patient portal. Now you can access parts of your medical record, email your doctor's office, and request medication refills online. 1. In your internet browser, go to [email or web address] 2. Click on the First Time User? Click Here link in the Sign In box. You will see the New Member Sign Up page. 3. Enter your [employer] Access Code exactly as it appears below. You will not need to use this code after youve completed the sign-up process. If you do not sign up before the expiration date, you must request a new code. · [employer] Access Code: [email or web address] Expires: 6/4/2017  3:33 PM 
 
4. Enter the last four digits of your Social Security Number (xxxx) and Date of Birth (mm/dd/yyyy) as indicated and click Submit. You will be taken to the next sign-up page. 5. Create a [employer] ID. This will be your [employer] login ID and cannot be changed, so think of one that is secure and easy to remember. 6. Create a [employer] password. You can change your password at any time. 7. Enter your Password Reset Question and Answer. This can be used at a later time if you forget your password. 8. Enter your e-mail address. You will receive e-mail notification when new information is available in [address] 9. Click Sign Up. You can now view and download portions of your medical record. 10. Click the Download Summary menu link to download a portable copy of your medical information. If you have questions, please visit the Frequently Asked Questions section of the MyChart website. Remember, MyChart is NOT to be used for urgent needs. For medical emergencies, dial 911. Now available from your iPhone and Android! General Information Please provide this summary of care documentation to your next provider. Patient Signature:  ____________________________________________________________ Date:  ____________________________________________________________  
  
Denise Octavio Provider Signature:  ____________________________________________________________ Date:  ____________________________________________________________

## 2017-04-27 NOTE — H&P
HOSPITALIST H&P      NAME:  Shanon Pabon   Age:  50 y.o.  :   1965   MRN:   [id number]    PCP: Clif Kumar NP    Attending MD: Ken Hallman DO    Treatment Team: Attending Provider: Brian Barriga MD; Primary Nurse: Angelica Dent; Consulting Provider: Manish Encarnacion MD; Consulting Provider: Sharifa Norman MD    HPI:     Shanon Pabon is a 50year old CM with a PMH of GERD, HTN, BRITTNEY (does not wear CPAP at home), and recent mitral valve repair due to prolapse on 17 presented back to the ER on 17 with sharp left sided chest pain, worse with inspiration, that radiates into his neck and is worse with reclining. He also complains of nausea and abdominal tenderness for 1-2 days. He stated that he had the mitral valve repair and has been doing well since then, until 2 days ago when he first started noticing the chest pain. When it didn't go away, he came to the ER to be evaluated. In the ER he was found to have a lipase of 953, pleural effusions on CT scan, and mildly elevated troponin.      Complete ROS done and is as stated in HPI or otherwise negative    Past Medical History:   Diagnosis Date    Acute mitral insufficiency     Difficult intubation     \"anterior larynx?  glide scope and blue bougie guide helpful per Dr Sandrita Calderon ENT    GERD (gastroesophageal reflux disease)     daily [facility] elevated 4\" and sleeps on 3 pillows    History of kidney stones     History of kidney stones     History of Jean Mountain spotted fever     Hypertension     no longer takes lisinopril, only Lasix    Mitral regurgitation     Obesity (BMI 30-39.9) 2017    BMI 37    Pancreatic cyst     PONV (postoperative nausea and vomiting)     with sinus sx    Right shoulder pain 2017    Acute on chronic (old injury)    Sleep apnea     Does not use CPAP    Thyroid nodule         Past Surgical History:   Procedure Laterality Date    CARDIAC SURG PROCEDURE UNLIST mitral valve repair    HX ERCP      removal of pancreatic cyst-    HX HEENT      lymphnode removed from L neck    HX LAP CHOLECYSTECTOMY  2009    HX UROLOGICAL  1983    cystoto remove kidney stone    SINUS SURGERY PROC UNLISTED          Prior to Admission Medications   Prescriptions Last Dose Informant Patient Reported? Taking? AZELASTINE HCL (ASTEPRO NA)   Yes No   Si Sprays by Nasal route daily. acyclovir (ZOVIRAX) 200 mg capsule   Yes No   Sig: Take 200 mg by mouth daily. aspirin delayed-release 81 mg tablet   Yes No   Sig: Take 81 mg by mouth every morning. furosemide (LASIX) 40 mg tablet   No No   Sig: Take 1 Tab by mouth daily. guaiFENesin ER (MUCINEX) 1,200 mg Ta12 ER tablet   No No   Sig: Over the Counter Mucinex- take to keep secretions thin and easy to cough up   ibuprofen (MOTRIN) 800 mg tablet   No No   Sig: Take 1 Tab by mouth four (4) times daily for 14 days. loratadine (CLARITIN) 10 mg tablet   Yes No   Sig: Take 10 mg by mouth daily as needed for Allergies. methyl salicylate-menthol (BENGAY) 15-10 % topical cream   No No   Sig: Over the counter BenGay use prn   metoprolol tartrate (LOPRESSOR) 25 mg tablet   No No   Sig: Take 1 Tab by mouth every twelve (12) hours. omeprazole (PRILOSEC) 40 mg capsule   Yes No   Sig: Take 40 mg by mouth every morning. potassium chloride (K-DUR, KLOR-CON) 20 mEq tablet   No No   Sig: Take 1 Tab by mouth daily for 6 days. tadalafil (CIALIS) 5 mg tablet   No No   Sig: Take 1 Tab by mouth daily. Patient taking differently: Take 5 mg by mouth daily as needed. tapentadol (NUCYNTA) 100 mg tablet   No No   Sig: Take 75 mg by mouth every six (6) hours as needed for Pain. Max Daily Amount: 300 mg.   traMADol-acetaminophen (ULTRACET) 37.5-325 mg per tablet   No No   Sig: Take 1 Tab by mouth every six (6) hours as needed for Pain. Max Daily Amount: 4 Tabs.       Facility-Administered Medications: None       Allergies   Allergen Reactions    Amoxil [Amoxicillin] Other (comments)     Upset stomach and diarrhea    Codeine Nausea and Vomiting        Social History   Substance Use Topics    Smoking status: Never Smoker    Smokeless tobacco: Never Used    Alcohol use No        Family History   Problem Relation Age of Onset    Adopted: Yes    Family history unknown: Yes        Objective:       Visit Vitals    /66    Pulse 78    Temp 98.2 °F (36.8 °C)    Resp 18    Ht 5' 10\" (1.778 m)    Wt 117.9 kg (260 lb)    SpO2 95%    BMI 37.31 kg/m2        Temp (24hrs), Av.2 °F (36.8 °C), Min:98.2 °F (36.8 °C), Max:98.2 °F (36.8 °C)      Oxygen Therapy  O2 Sat (%): 95 % (17)  Pulse via Oximetry: 83 beats per minute (17)  O2 Device: Room air (17 1526)      Physical Exam:      General:    Alert, cooperative, no distress, appears stated age. Eyes:   PERRLA EOMI Anicteric  Head:   Normocephalic, without obvious abnormality, atraumatic. ENT:  Nares normal. No drainage. Lungs:   Bibasilar crackle/dimished. No Wheezing or Rhonchi. No rales. Heart:   Regular rate and rhythm,  no murmur, rub or gallop. No JVD. Pain semi-reproducible with palpation. Abdomen:   Soft, Diffusely tender. Not distended. Bowel sounds normal.   MSK:  Trace edema. No clubbing or cyanosis. No deformities/lesions. Skin:     Texture, turgor normal. No rashes or lesions. No Jaundice. Neurologic: Alert and oriented x 3, no focal deficits   Psychiatry:      No depression/anxiety. Mood congruent for context. Heme/Lymph/Immune: No echymoses or overt signs of bleeding.     Data Review:   Recent Results (from the past 24 hour(s))   EKG, 12 LEAD, INITIAL    Collection Time: 17  2:02 PM   Result Value Ref Range    Ventricular Rate 78 BPM    Atrial Rate 78 BPM    P-R Interval 166 ms    QRS Duration 94 ms    Q-T Interval 444 ms    QTC Calculation (Bezet) 506 ms    Calculated P Axis 37 degrees    Calculated R Axis -8 degrees    Calculated T Axis 37 degrees    Diagnosis       Normal sinus rhythm  Prolonged QT  Abnormal ECG  When compared with ECG of 22-APR-2017 07:28,  No significant change was found  Confirmed by AARON ROSADO (), Annika Araiza ([id number]) on 4/27/2017 5:33:50 PM     TROPONIN I    Collection Time: 04/27/17  2:13 PM   Result Value Ref Range    Troponin-I, Qt. 0.17 (HH) 0.02 - 0.05 NG/ML   CBC WITH AUTOMATED DIFF    Collection Time: 04/27/17  2:13 PM   Result Value Ref Range    WBC 9.4 4.3 - 11.1 K/uL    RBC 3.71 (L) 4.23 - 5.67 M/uL    HGB 11.7 (L) 13.6 - 17.2 g/dL    HCT 32.4 (L) 41.1 - 50.3 %    MCV 87.3 79.6 - 97.8 FL    MCH 31.5 26.1 - 32.9 PG    MCHC 36.1 (H) 31.4 - 35.0 g/dL    RDW 13.7 11.9 - 14.6 %    PLATELET 623 643 - 448 K/uL    MPV 10.8 10.8 - 14.1 FL    DF AUTOMATED      NEUTROPHILS 72 43 - 78 %    LYMPHOCYTES 14 13 - 44 %    MONOCYTES 8 4.0 - 12.0 %    EOSINOPHILS 4 0.5 - 7.8 %    BASOPHILS 0 0.0 - 2.0 %    IMMATURE GRANULOCYTES 2.2 0.0 - 5.0 %    ABS. NEUTROPHILS 6.7 1.7 - 8.2 K/UL    ABS. LYMPHOCYTES 1.4 0.5 - 4.6 K/UL    ABS. MONOCYTES 0.7 0.1 - 1.3 K/UL    ABS. EOSINOPHILS 0.3 0.0 - 0.8 K/UL    ABS. BASOPHILS 0.0 0.0 - 0.2 K/UL    ABS. IMM. GRANS. 0.2 0.0 - 0.5 K/UL   METABOLIC PANEL, COMPREHENSIVE    Collection Time: 04/27/17  2:13 PM   Result Value Ref Range    Sodium 142 136 - 145 mmol/L    Potassium 4.0 3.5 - 5.1 mmol/L    Chloride 105 98 - 107 mmol/L    CO2 28 21 - 32 mmol/L    Anion gap 9 7 - 16 mmol/L    Glucose 83 65 - 100 mg/dL    BUN 11 6 - 23 MG/DL    Creatinine 1.21 0.8 - 1.5 MG/DL    GFR est AA >60 >60 ml/min/1.73m2    GFR est non-AA >60 >60 ml/min/1.73m2    Calcium 8.5 8.3 - 10.4 MG/DL    Bilirubin, total 0.9 0.2 - 1.1 MG/DL    ALT (SGPT) 79 (H) 12 - 65 U/L    AST (SGOT) 63 (H) 15 - 37 U/L    Alk.  phosphatase 56 50 - 136 U/L    Protein, total 6.7 6.3 - 8.2 g/dL    Albumin 3.1 (L) 3.5 - 5.0 g/dL    Globulin 3.6 (H) 2.3 - 3.5 g/dL    A-G Ratio 0.9 (L) 1.2 - 3.5     LIPASE    Collection Time: 04/27/17  2:13 PM   Result Value Ref Range    Lipase 935 (H) 73 - 393 U/L       Imaging /Procedures /Studies     Assessment and Plan: Active Hospital Problems    Diagnosis Date Noted    Pancreatitis 04/27/2017    Chest pain 04/27/2017    Pleural effusion 04/27/2017    Elevated troponin 04/27/2017    Atelectasis 04/27/2017    BRITTNEY (obstructive sleep apnea) 03/07/2017       PLAN  - Admit to telemetry for chest pain s/p MV repair, pancreatitis, and pleural effusions  - Start lactated ringers @ 150 ml/hr  - Daily lipase  - NPO except meds  - Check Mag & Phos  - Check triglycerides  - Consult GI - known to Dr. Hamilton Vick - follows up for pancreatic cyst  - Trend one more troponin - unlikely to be ACS with recent LHC showing normal coronaries  - Continue ASA daily  - Consult cardiology due to recent MV repair  - Consult pulmonary for effusions - thoracentesis in AM  - Trend BMP Daily  - Continue Nucynta and Tramadol for pain. Add PRN Dilaudid. - No abx at this point. No fevers. WBC improved. Check procalcitonin.   - Continue PPI  - Zofran for nausea    Code Status: FULL CODE  DVT Prophylaxis: SCDs    Anticipated discharge: 4 days      Hill Relic, DO  6:28 PM

## 2017-04-27 NOTE — ED TRIAGE NOTES
Pt arrived via ems with the complaint of chest pain since last night. Pt states pain is to the left upper and made worse with palpation.

## 2017-04-27 NOTE — PROGRESS NOTES
TRANSFER - IN REPORT:    Verbal report received from Lewis Claire RN (name) on Lyn Lunsford  being received from ED(unit) for routine progression of care      Report consisted of patients Situation, Background, Assessment and   Recommendations(SBAR). Information from the following report(s) ED Summary was reviewed with the receiving nurse. Opportunity for questions and clarification was provided. Assessment completed upon patients arrival to unit and care assumed.

## 2017-04-27 NOTE — CONSULTS
CONSULT NOTE    Diandra Luo    2017    Date of Admission:  2017    The patient's chart is reviewed and the patient is discussed with the staff. Subjective:     Patient is a 50 y.o.  male seen and evaluated at the request of Dr. Alem Van for the evaluation pleural effusion. . Patient recently underwent minimally invasive mitral valve repair on 2017. He was discharged on 2017. He was doing very well however today he developed increased chest pain the middle of this chest and shortness of breath. When he came to ER he had chest CT scan to rule out PE which did not show PE however showed moderate right pleural effusion with atelectasis. Patient is being admitted to hospitalist service and we were consulted for effusion. Patient also has history of sleep apnea however he does not wear his CPAP. Review of Systems  A comprehensive review of systems was negative except for that written in the HPI. Patient Active Problem List   Diagnosis Code    Mitral regurgitation I34.0    Essential hypertension I10    TIA (transient ischemic attack) G45.9    Shortness of breath R06.02    BRITTNEY (obstructive sleep apnea) G47.33    GERD (gastroesophageal reflux disease) K21.9    Mitral insufficiency I34.0    Mitral valve regurgitation I34.0    Thyroid nodule E04.1    Right shoulder pain M25.511    Pancreatitis K85.90    Chest pain R07.9    Pleural effusion J90    Elevated troponin R74.8           Prior to Admission Medications   Prescriptions Last Dose Informant Patient Reported? Taking? AZELASTINE HCL (ASTEPRO NA)   Yes No   Si Sprays by Nasal route daily. acyclovir (ZOVIRAX) 200 mg capsule   Yes No   Sig: Take 200 mg by mouth daily. aspirin delayed-release 81 mg tablet   Yes No   Sig: Take 81 mg by mouth every morning. furosemide (LASIX) 40 mg tablet   No No   Sig: Take 1 Tab by mouth daily.    guaiFENesin ER (MUCINEX) 1,200 mg Ta12 ER tablet   No No   Sig: Over the Counter Mucinex- take to keep secretions thin and easy to cough up   ibuprofen (MOTRIN) 800 mg tablet   No No   Sig: Take 1 Tab by mouth four (4) times daily for 14 days. loratadine (CLARITIN) 10 mg tablet   Yes No   Sig: Take 10 mg by mouth daily as needed for Allergies. methyl salicylate-menthol (BENGAY) 15-10 % topical cream   No No   Sig: Over the counter BenGay use prn   metoprolol tartrate (LOPRESSOR) 25 mg tablet   No No   Sig: Take 1 Tab by mouth every twelve (12) hours. omeprazole (PRILOSEC) 40 mg capsule   Yes No   Sig: Take 40 mg by mouth every morning. potassium chloride (K-DUR, KLOR-CON) 20 mEq tablet   No No   Sig: Take 1 Tab by mouth daily for 6 days. tadalafil (CIALIS) 5 mg tablet   No No   Sig: Take 1 Tab by mouth daily. Patient taking differently: Take 5 mg by mouth daily as needed. tapentadol (NUCYNTA) 100 mg tablet   No No   Sig: Take 75 mg by mouth every six (6) hours as needed for Pain. Max Daily Amount: 300 mg.   traMADol-acetaminophen (ULTRACET) 37.5-325 mg per tablet   No No   Sig: Take 1 Tab by mouth every six (6) hours as needed for Pain. Max Daily Amount: 4 Tabs.       Facility-Administered Medications: None       Past Medical History:   Diagnosis Date    Acute mitral insufficiency     Difficult intubation     \"anterior larynx?  glide scope and blue bougie guide helpful per Dr Finn Calderon ENT    GERD (gastroesophageal reflux disease)     daily med and HOB elevated 4\" and sleeps on 3 pillows    History of kidney stones 1982    History of kidney stones     History of [place] spotted fever 1983    Hypertension     no longer takes lisinopril, only Lasix    Mitral regurgitation     Obesity (BMI 30-39.9) 04/17/2017    BMI 37    Pancreatic cyst     PONV (postoperative nausea and vomiting)     with sinus sx    Right shoulder pain 4/24/2017    Acute on chronic (old injury)    Sleep apnea     Does not use CPAP    Thyroid nodule      Past Surgical History:   Procedure Laterality Date    CARDIAC SURG PROCEDURE UNLIST      mitral valve repair    HX ERCP      removal of pancreatic cyst-    HX HEENT      lymphnode removed from L neck    HX LAP CHOLECYSTECTOMY  2009    HX UROLOGICAL  1983    cystoto remove kidney stone    SINUS SURGERY PROC UNLISTED  2002     Social History     Social History    Marital status:      Spouse name: N/A    Number of children: N/A    Years of education: N/A     Occupational History    Not on file. Social History Main Topics    Smoking status: Never Smoker    Smokeless tobacco: Never Used    Alcohol use No    Drug use: No    Sexual activity: Not on file     Other Topics Concern    Not on file     Social History Narrative     Family History   Problem Relation Age of Onset    Adopted: Yes    Family history unknown: Yes     Allergies   Allergen Reactions    Amoxil [Amoxicillin] Other (comments)     Upset stomach and diarrhea    Codeine Nausea and Vomiting       No current facility-administered medications for this encounter. Current Outpatient Prescriptions   Medication Sig    traMADol-acetaminophen (ULTRACET) 37.5-325 mg per tablet Take 1 Tab by mouth every six (6) hours as needed for Pain. Max Daily Amount: 4 Tabs.  guaiFENesin ER (MUCINEX) 1,200 mg Ta12 ER tablet Over the Counter Mucinex- take to keep secretions thin and easy to cough up    ibuprofen (MOTRIN) 800 mg tablet Take 1 Tab by mouth four (4) times daily for 14 days.  methyl salicylate-menthol (BENGAY) 15-10 % topical cream Over the counter BenGay use prn    metoprolol tartrate (LOPRESSOR) 25 mg tablet Take 1 Tab by mouth every twelve (12) hours.  potassium chloride (K-DUR, KLOR-CON) 20 mEq tablet Take 1 Tab by mouth daily for 6 days.  tapentadol (NUCYNTA) 100 mg tablet Take 75 mg by mouth every six (6) hours as needed for Pain. Max Daily Amount: 300 mg.  furosemide (LASIX) 40 mg tablet Take 1 Tab by mouth daily.  loratadine (CLARITIN) 10 mg tablet Take 10 mg by mouth daily as needed for Allergies.  AZELASTINE HCL (ASTEPRO NA) 2 Sprays by Nasal route daily.  aspirin delayed-release 81 mg tablet Take 81 mg by mouth every morning.  omeprazole (PRILOSEC) 40 mg capsule Take 40 mg by mouth every morning.  tadalafil (CIALIS) 5 mg tablet Take 1 Tab by mouth daily. (Patient taking differently: Take 5 mg by mouth daily as needed.)    acyclovir (ZOVIRAX) 200 mg capsule Take 200 mg by mouth daily. Objective:     Vitals:    04/27/17 1405 04/27/17 1525 04/27/17 1555 04/27/17 1747   BP: 132/78 120/63 115/63 128/66   Pulse: 77 77 78    Resp: 18      Temp: 98.2 °F (36.8 °C)      SpO2: 98% 93% 98% 95%   Weight: 260 lb (117.9 kg)      Height: 5' 10\" (1.778 m)          PHYSICAL EXAM     Constitutional:  the patient is well developed and in no acute distress  EENMT:  Sclera clear, pupils equal, oral mucosa moist  Respiratory: diminished on R   Cardiovascular:  RRR without M,G,R  Gastrointestinal: soft and non-tender; with positive bowel sounds. Musculoskeletal: warm without cyanosis. There is no lower leg edema.   Skin:  no jaundice or rashes, no wounds   Neurologic: no gross neuro deficits     Psychiatric:  alert and oriented x 3    Chest  CT:        Recent Labs      04/27/17   1413   WBC  9.4   HGB  11.7*   HCT  32.4*   PLT  276     Recent Labs      04/27/17   1413  04/25/17   0515   NA  142  141   K  4.0  3.8   CL  105  102   GLU  83  78   CO2  28  32   BUN  11  14   CREA  1.21  1.05   MG   --   2.1   CA  8.5  7.8*   TROIQ  0.17*   --    ALB  3.1*   --    TBILI  0.9   --    ALT  79*   --    SGOT  63*   --          Assessment:  (Medical Decision Making)     Hospital Problems  Date Reviewed: 4/25/2017          Codes Class Noted POA    Pancreatitis ICD-10-CM: K85.90  ICD-9-CM: 514.4  4/27/2017 Unknown        Chest pain ICD-10-CM: R07.9  ICD-9-CM: 786.50  4/27/2017 Unknown        Pleural effusion on R side with atelectasis ICD-10-CM: J90  ICD-9-CM: 511.9  4/27/2017 Unknown        Elevated troponin ICD-10-CM: R74.8  ICD-9-CM: 790.6  4/27/2017 Unknown              Plan:  (Medical Decision Making)     -- thoracentesis in AM      More than 50% of the time documented was spent in face-to-face contact with the patient and in the care of the patient on the floor/unit where the patient is located. Thank you very much for this referral.  We appreciate the opportunity to participate in this patient's care. Will follow along with above stated plan.     Karen Rodriguez MD

## 2017-04-28 ENCOUNTER — APPOINTMENT (OUTPATIENT)
Dept: MRI IMAGING | Age: 52
DRG: 439 | End: 2017-04-28
Attending: INTERNAL MEDICINE
Payer: COMMERCIAL

## 2017-04-28 ENCOUNTER — APPOINTMENT (OUTPATIENT)
Dept: ULTRASOUND IMAGING | Age: 52
DRG: 439 | End: 2017-04-28
Attending: PHYSICIAN ASSISTANT
Payer: COMMERCIAL

## 2017-04-28 LAB
ATRIAL RATE: 78 BPM
CALCULATED P AXIS, ECG09: 17 DEGREES
CALCULATED R AXIS, ECG10: 4 DEGREES
CALCULATED T AXIS, ECG11: 39 DEGREES
CHOLEST SERPL-MCNC: 154 MG/DL
DIAGNOSIS, 93000: NORMAL
HDLC SERPL-MCNC: 27 MG/DL (ref 40–60)
HDLC SERPL: 5.7 {RATIO}
LDLC SERPL CALC-MCNC: 99.6 MG/DL
LIPASE SERPL-CCNC: 917 U/L (ref 73–393)
LIPID PROFILE,FLP: ABNORMAL
P-R INTERVAL, ECG05: 180 MS
Q-T INTERVAL, ECG07: 434 MS
QRS DURATION, ECG06: 98 MS
QTC CALCULATION (BEZET), ECG08: 494 MS
TRIGL SERPL-MCNC: 137 MG/DL (ref 35–150)
VENTRICULAR RATE, ECG03: 78 BPM
VLDLC SERPL CALC-MCNC: 27.4 MG/DL (ref 6–23)

## 2017-04-28 PROCEDURE — 76604 US EXAM CHEST: CPT | Performed by: INTERNAL MEDICINE

## 2017-04-28 PROCEDURE — 94640 AIRWAY INHALATION TREATMENT: CPT

## 2017-04-28 PROCEDURE — 83690 ASSAY OF LIPASE: CPT | Performed by: INTERNAL MEDICINE

## 2017-04-28 PROCEDURE — 80061 LIPID PANEL: CPT | Performed by: INTERNAL MEDICINE

## 2017-04-28 PROCEDURE — 94760 N-INVAS EAR/PLS OXIMETRY 1: CPT

## 2017-04-28 PROCEDURE — 74011250636 HC RX REV CODE- 250/636: Performed by: INTERNAL MEDICINE

## 2017-04-28 PROCEDURE — 74181 MRI ABDOMEN W/O CONTRAST: CPT

## 2017-04-28 PROCEDURE — 65660000000 HC RM CCU STEPDOWN

## 2017-04-28 PROCEDURE — 36415 COLL VENOUS BLD VENIPUNCTURE: CPT | Performed by: INTERNAL MEDICINE

## 2017-04-28 PROCEDURE — 74011250637 HC RX REV CODE- 250/637: Performed by: INTERNAL MEDICINE

## 2017-04-28 PROCEDURE — 74011250636 HC RX REV CODE- 250/636

## 2017-04-28 PROCEDURE — 74011000250 HC RX REV CODE- 250: Performed by: PHYSICIAN ASSISTANT

## 2017-04-28 PROCEDURE — 74011250637 HC RX REV CODE- 250/637: Performed by: PHYSICIAN ASSISTANT

## 2017-04-28 PROCEDURE — 97165 OT EVAL LOW COMPLEX 30 MIN: CPT

## 2017-04-28 PROCEDURE — 99232 SBSQ HOSP IP/OBS MODERATE 35: CPT | Performed by: INTERNAL MEDICINE

## 2017-04-28 PROCEDURE — 93005 ELECTROCARDIOGRAM TRACING: CPT | Performed by: NURSE PRACTITIONER

## 2017-04-28 PROCEDURE — 74011000250 HC RX REV CODE- 250: Performed by: INTERNAL MEDICINE

## 2017-04-28 PROCEDURE — 97161 PT EVAL LOW COMPLEX 20 MIN: CPT

## 2017-04-28 PROCEDURE — 77010033678 HC OXYGEN DAILY

## 2017-04-28 PROCEDURE — C8929 TTE W OR WO FOL WCON,DOPPLER: HCPCS

## 2017-04-28 RX ORDER — PANTOPRAZOLE SODIUM 40 MG/1
40 TABLET, DELAYED RELEASE ORAL
Status: DISCONTINUED | OUTPATIENT
Start: 2017-04-28 | End: 2017-04-30 | Stop reason: HOSPADM

## 2017-04-28 RX ORDER — MORPHINE SULFATE 2 MG/ML
INJECTION, SOLUTION INTRAMUSCULAR; INTRAVENOUS
Status: COMPLETED
Start: 2017-04-28 | End: 2017-04-28

## 2017-04-28 RX ORDER — ALBUTEROL SULFATE 0.83 MG/ML
2.5 SOLUTION RESPIRATORY (INHALATION)
Status: DISCONTINUED | OUTPATIENT
Start: 2017-04-28 | End: 2017-04-30 | Stop reason: HOSPADM

## 2017-04-28 RX ORDER — BUSPIRONE HYDROCHLORIDE 5 MG/1
5 TABLET ORAL 2 TIMES DAILY
Status: DISCONTINUED | OUTPATIENT
Start: 2017-04-28 | End: 2017-04-30 | Stop reason: HOSPADM

## 2017-04-28 RX ORDER — MORPHINE SULFATE 2 MG/ML
2 INJECTION, SOLUTION INTRAMUSCULAR; INTRAVENOUS
Status: DISCONTINUED | OUTPATIENT
Start: 2017-04-28 | End: 2017-04-30 | Stop reason: HOSPADM

## 2017-04-28 RX ORDER — IBUPROFEN 400 MG/1
800 TABLET ORAL 3 TIMES DAILY
Status: DISCONTINUED | OUTPATIENT
Start: 2017-04-28 | End: 2017-04-28

## 2017-04-28 RX ORDER — IBUPROFEN 400 MG/1
800 TABLET ORAL
Status: DISCONTINUED | OUTPATIENT
Start: 2017-04-28 | End: 2017-04-28

## 2017-04-28 RX ORDER — MORPHINE SULFATE 2 MG/ML
4 INJECTION, SOLUTION INTRAMUSCULAR; INTRAVENOUS ONCE
Status: COMPLETED | OUTPATIENT
Start: 2017-04-28 | End: 2017-04-28

## 2017-04-28 RX ORDER — IBUPROFEN 200 MG
800 TABLET ORAL EVERY 8 HOURS
Status: DISCONTINUED | OUTPATIENT
Start: 2017-04-28 | End: 2017-04-30 | Stop reason: HOSPADM

## 2017-04-28 RX ORDER — FENTANYL 12.5 UG/1
1 PATCH TRANSDERMAL
Status: DISCONTINUED | OUTPATIENT
Start: 2017-04-28 | End: 2017-04-30 | Stop reason: HOSPADM

## 2017-04-28 RX ORDER — MORPHINE SULFATE 2 MG/ML
2 INJECTION, SOLUTION INTRAMUSCULAR; INTRAVENOUS
Status: DISCONTINUED | OUTPATIENT
Start: 2017-04-28 | End: 2017-04-28

## 2017-04-28 RX ADMIN — ONDANSETRON 4 MG: 2 INJECTION INTRAMUSCULAR; INTRAVENOUS at 02:06

## 2017-04-28 RX ADMIN — IBUPROFEN 800 MG: 400 TABLET, FILM COATED ORAL at 08:56

## 2017-04-28 RX ADMIN — Medication 5 ML: at 14:51

## 2017-04-28 RX ADMIN — PANTOPRAZOLE SODIUM 40 MG: 40 TABLET, DELAYED RELEASE ORAL at 16:42

## 2017-04-28 RX ADMIN — IBUPROFEN 800 MG: 200 TABLET, FILM COATED ORAL at 16:41

## 2017-04-28 RX ADMIN — SODIUM CHLORIDE, SODIUM LACTATE, POTASSIUM CHLORIDE, AND CALCIUM CHLORIDE 150 ML/HR: 600; 310; 30; 20 INJECTION, SOLUTION INTRAVENOUS at 09:25

## 2017-04-28 RX ADMIN — MORPHINE SULFATE 2 MG: 2 INJECTION, SOLUTION INTRAMUSCULAR; INTRAVENOUS at 09:25

## 2017-04-28 RX ADMIN — BUSPIRONE HYDROCHLORIDE 5 MG: 5 TABLET ORAL at 17:18

## 2017-04-28 RX ADMIN — ASPIRIN 81 MG: 81 TABLET, COATED ORAL at 08:56

## 2017-04-28 RX ADMIN — MORPHINE SULFATE 2 MG: 2 INJECTION, SOLUTION INTRAMUSCULAR; INTRAVENOUS at 01:59

## 2017-04-28 RX ADMIN — MORPHINE SULFATE 2 MG: 2 INJECTION, SOLUTION INTRAMUSCULAR; INTRAVENOUS at 07:09

## 2017-04-28 RX ADMIN — MORPHINE SULFATE 2 MG: 2 INJECTION, SOLUTION INTRAMUSCULAR; INTRAVENOUS at 13:49

## 2017-04-28 RX ADMIN — PERFLUTREN 1 ML: 6.52 INJECTION, SUSPENSION INTRAVENOUS at 10:00

## 2017-04-28 RX ADMIN — Medication 10 ML: at 22:38

## 2017-04-28 RX ADMIN — SODIUM CHLORIDE, SODIUM LACTATE, POTASSIUM CHLORIDE, AND CALCIUM CHLORIDE 150 ML/HR: 600; 310; 30; 20 INJECTION, SOLUTION INTRAVENOUS at 17:18

## 2017-04-28 RX ADMIN — TRAMADOL HYDROCHLORIDE 50 MG: 50 TABLET, FILM COATED ORAL at 03:43

## 2017-04-28 RX ADMIN — ONDANSETRON 4 MG: 2 INJECTION INTRAMUSCULAR; INTRAVENOUS at 07:11

## 2017-04-28 RX ADMIN — Medication 4 MG: at 04:40

## 2017-04-28 RX ADMIN — ONDANSETRON 4 MG: 2 INJECTION INTRAMUSCULAR; INTRAVENOUS at 14:50

## 2017-04-28 RX ADMIN — MORPHINE SULFATE 2 MG: 2 INJECTION, SOLUTION INTRAMUSCULAR; INTRAVENOUS at 22:32

## 2017-04-28 RX ADMIN — METOPROLOL TARTRATE 25 MG: 25 TABLET ORAL at 08:56

## 2017-04-28 RX ADMIN — SODIUM CHLORIDE, SODIUM LACTATE, POTASSIUM CHLORIDE, AND CALCIUM CHLORIDE 150 ML/HR: 600; 310; 30; 20 INJECTION, SOLUTION INTRAVENOUS at 01:34

## 2017-04-28 RX ADMIN — MORPHINE SULFATE 2 MG: 2 INJECTION, SOLUTION INTRAMUSCULAR; INTRAVENOUS at 15:50

## 2017-04-28 RX ADMIN — ALBUTEROL SULFATE 2.5 MG: 2.5 SOLUTION RESPIRATORY (INHALATION) at 10:17

## 2017-04-28 RX ADMIN — PANTOPRAZOLE SODIUM 40 MG: 40 TABLET, DELAYED RELEASE ORAL at 08:56

## 2017-04-28 RX ADMIN — Medication 2 MG: at 01:59

## 2017-04-28 RX ADMIN — BUSPIRONE HYDROCHLORIDE 5 MG: 5 TABLET ORAL at 11:09

## 2017-04-28 RX ADMIN — MORPHINE SULFATE 2 MG: 2 INJECTION, SOLUTION INTRAMUSCULAR; INTRAVENOUS at 11:47

## 2017-04-28 RX ADMIN — METOPROLOL TARTRATE 25 MG: 25 TABLET ORAL at 20:58

## 2017-04-28 NOTE — PROGRESS NOTES
Bedside and Verbal shift change report received from Pranav Little RN (offgoing nurse). Report included the following information SBAR, Kardex, MAR and Recent Results.

## 2017-04-28 NOTE — PROGRESS NOTES
Hospitalist Progress Note    2017  Admit Date: 2017  3:15 PM   NAME: Miguelina Hamilton   :  1965   MRN:  [id number]   Attending: Angelique Rhodes DO  PCP:  Johanna Perea NP      Admitted for: left sided cp-     SUBJECTIVE:   Patient seen. Complains of significant left sided chest pain. Says he has pancreatitis- \"something must be wrong\". Requesting pain meds although just given morphine 2mg 30min ago as per nursing. Pain is reproducible on palpation of the chest- already on motrin.          Hospital Course      Review of Systems negative with exception of pertinent positives noted above  Past medical history unchanged from H&P    PHYSICAL EXAM     Visit Vitals    /60 (BP Patient Position: Head of bed elevated (Comment degrees))    Pulse 76    Temp 97.2 °F (36.2 °C)    Resp 18    Ht 5' 10\" (1.778 m)    Wt 116.7 kg (257 lb 3.2 oz)    SpO2 95%    BMI 36.9 kg/m2      Temp (24hrs), Av.6 °F (36.4 °C), Min:97.2 °F (36.2 °C), Max:97.8 °F (36.6 °C)    Oxygen Therapy  O2 Sat (%): 95 % (17 1155)  Pulse via Oximetry: 90 beats per minute (17 1019)  O2 Device: Nasal cannula (17 1019)  O2 Flow Rate (L/min): 2 l/min (17 1019)    Intake/Output Summary (Last 24 hours) at 17 1731  Last data filed at 17 1014   Gross per 24 hour   Intake           1722.5 ml   Output             1001 ml   Net            721.5 ml        General: No acute distress  mucous membranes pink and moist acyanotic anicteric  Neck:  Supple full range of motion  Lungs:  Air entry equal bilaterally, no crepitations rales rhonchi   Heart:  Regular rate and rhythm,  No murmur, rub, or gallop  Abdomen: Soft, Non distended, Non tender, no rebound guarding Positive bowel sounds  Extremities: No cyanosis, clubbing or edema  Neurologic:  No focal deficits  Musculoskeletal: no   Joint swelling tenderness erythema  Skin:  No erythema, rashes noted          LAB  No results for input(s): GLUCPOC in the last 72 hours. No lab exists for component: [address]      04/27/17   1413   WBC  9.4   HGB  11.7*   HCT  32.4*   PLT  276     Recent Labs      04/27/17   2150  04/27/17   1413   NA   --   142   K   --   4.0   CL   --   105   CO2   --   28   GLU   --   83   BUN   --   11   CREA   --   1.21   MG   --   2.1   CA   --   8.5   PHOS   --   4.3   TROIQ  0.15*  0.17*   ALB   --   3.1*   TBILI   --   0.9   ALT   --   79*   SGOT   --   63*       EKG and imaging reviewed personally by me  No results found. Results for orders placed or performed during the hospital encounter of 04/27/17   EKG, 12 LEAD, INITIAL   Result Value Ref Range    Ventricular Rate 78 BPM    Atrial Rate 78 BPM    P-R Interval 166 ms    QRS Duration 94 ms    Q-T Interval 444 ms    QTC Calculation (Bezet) 506 ms    Calculated P Axis 37 degrees    Calculated R Axis -8 degrees    Calculated T Axis 37 degrees    Diagnosis       Normal sinus rhythm  Prolonged QT  Abnormal ECG  When compared with ECG of 22-APR-2017 07:28,  No significant change was found  Confirmed by AARON ROSADO (), Zachariah Huang ([id number]) on 4/27/2017 5:33:50 PM       XR Results (most recent):    Results from Hospital Encounter encounter on 04/27/17   XR CHEST PA LAT   Narrative Chest x-ray PA and lateral: 4/27/2017    INDICATION: Dyspnea    COMPARISON: 4/24/2017    The left lung field is now clear. There is a persistent right middle lobe and  right lower lobe infiltrate and small right pleural effusion. Heart is enlarged.          Impression IMPRESSION: As above        Active problems  Active Hospital Problems    Diagnosis Date Noted    Pancreatitis 04/27/2017    Chest pain 04/27/2017    Pleural effusion 04/27/2017    Elevated troponin 04/27/2017    Atelectasis 04/27/2017    BRITTNEY (obstructive sleep apnea) 03/07/2017       ASSESSMENT  AND PLAN      · Pancreatitis- continue ivfls and npo  · Cp- not sure this is organic in nature - it is reproducible on exam but not improved with nsaids. pulm added a fentayl patch and buspar. · Will need full clinical workup- will continue mgt for pancreatitis at this time  · Pleural effusions- for drainage with pulmonary  · Elevated trops- cardiology following   · Atelectasis- as per pulm    dispo- pending  Clinical diagnosis remains unclear. Appreciate pulmonary, cardiology and gi input.     DVT Prophylaxis:   Patient remains high risk for decompensation, given     Signed By: Celso Red MD     April 28, 2017

## 2017-04-28 NOTE — PROGRESS NOTES
Pt arrived to room 826 from MRI. MRI completed. No acute distress, 96% on 2L nc. LR @ 150ml/hr. SCD's in room. Report given to Migdalia Metz RN. Family at bedside.

## 2017-04-28 NOTE — PROGRESS NOTES
Consult received to visit patient. Patient in a great deal of pain and I notified staff. I offered to support patient with prayer and attempted to engage patient in some relaxation. Patient decline prayer and support. 's remain available for support. Celestina Glaser M.Div.

## 2017-04-28 NOTE — PROGRESS NOTES
Patient called complaining of chest pain 10/10. States the pain is different that previously when given Ultram.  Pain previously was more incisional related where now the pain is on the left side and sharp. /74. NSR 77. O2 98% 2L NC.  EKG ordered and is at bedside. No changes on EKG. Patient moaning and groaning saying it is his \"heart. \"  Call placed to [facility].

## 2017-04-28 NOTE — PROGRESS NOTES
Pt to MRI with transport, Eula Reynoso in MRI notified that patient will be transferred to [facility] after MRI, family notified

## 2017-04-28 NOTE — PROGRESS NOTES
Shaji Mariee  Admission Date: 4/27/2017             Daily Progress Note: 4/28/2017    The patient's chart is reviewed and the patient is discussed with the staff. Pt is a 49 yo  male with a history of BRITTNEY and heart murmur. Pt developed worsening shortness of breath and echo revealed severe MR with flail P2 segment. He had minimally invasive MV repair on 4/21/17. Postoperatively pt had acute on chronic R shoulder pain post operatively. His shoulder pain improved and he was weaned off of O2 with use of IPPBs and IS. He was discharged home on 4/25. He returned on 4/27 with chest pain, abdominal pain, and persistent R shoulder pain. Pt had a chest CT which was negative for PE but revealed R pleural effusion, atelectasis, and pancreatic cyst with elevated Lipase. We have been consulted and pt is to have ultrasound evaluation and possible thoracentesis. Subjective:     Pt lying in bed on 2L O2. Pt's wife is at bedside and she is updated. Pt awakes and he complains of chest pain and abd pain. Pt denies cough. He is very tired and weak. Pt's wife reports that pt has been showing signs of depression.      Current Facility-Administered Medications   Medication Dose Route Frequency    morphine injection 2 mg  2 mg IntraVENous Q2H PRN    aspirin delayed-release tablet 81 mg  81 mg Oral 7am    ibuprofen (MOTRIN) tablet 800 mg  800 mg Oral QID    methyl salicylate-menthol (BENGAY) 15-10 % cream   Topical Q6H PRN    metoprolol tartrate (LOPRESSOR) tablet 25 mg  25 mg Oral Q12H    pantoprazole (PROTONIX) tablet 40 mg  40 mg Oral ACB    sodium chloride (NS) flush 5-10 mL  5-10 mL IntraVENous Q8H    sodium chloride (NS) flush 5-10 mL  5-10 mL IntraVENous PRN    naloxone (NARCAN) injection 0.4 mg  0.4 mg IntraVENous PRN    ondansetron (ZOFRAN) injection 4 mg  4 mg IntraVENous Q4H PRN    lactated ringers infusion  150 mL/hr IntraVENous CONTINUOUS    traMADol (ULTRAM) tablet 50 mg  50 mg Oral Q6H PRN    lip protectant (BLISTEX) ointment   Topical PRN    temazepam (RESTORIL) capsule 15 mg  15 mg Oral QHS PRN       Review of Systems  +cough  +weakness  +chest pain  Constitutional: negative for fever, chills, sweats  Cardiovascular: negative for palpitations, syncope, edema  Gastrointestinal:  negative for dysphagia, reflux, vomiting, diarrhea, abdominal pain, or melena  Neurologic:  negative for focal weakness, numbness, headache    Objective:     Vitals:    04/28/17 0136 04/28/17 0434 04/28/17 0620 04/28/17 0715   BP: 109/68  104/63 129/71   Pulse: 75 86 85 85   Resp: 16  16 16   Temp: 97.8 °F (36.6 °C)  97.6 °F (36.4 °C) 97.7 °F (36.5 °C)   SpO2: 91%  94% 93%   Weight:   257 lb 3.2 oz (116.7 kg)    Height:         Intake and Output:   04/26 1901 - 04/28 0700  In: 1722.5 [P.O.:180; I.V.:1542.5]  Out: 501 [Urine:501]       Physical Exam:   Constitution:  the patient is well developed and in no acute distress, on 2L O2  EENMT:  Sclera clear, pupils equal, oral mucosa moist  Respiratory: diminished on R and crackles on R  Cardiovascular:  RRR without M,G,R  Gastrointestinal: + tenderness  Musculoskeletal: warm without cyanosis. There is no lower leg edema. Skin:  no jaundice or rashes   Neurologic: no gross neuro deficits     Psychiatric:  alert and oriented x 3    CHEST XRAY:       LAB  No results for input(s): GLUCPOC in the last 72 hours. No lab exists for component: [address]      04/27/17   1413   WBC  9.4   HGB  11.7*   HCT  32.4*   PLT  276     Recent Labs      04/27/17   2150  04/27/17   1413   NA   --   142   K   --   4.0   CL   --   105   CO2   --   28   GLU   --   83   BUN   --   11   CREA   --   1.21   MG   --   2.1   CA   --   8.5   PHOS   --   4.3   TROIQ  0.15*  0.17*   ALB   --   3.1*   TBILI   --   0.9   ALT   --   79*   SGOT   --   63*     No results for input(s): PH, PCO2, PO2, HCO3 in the last 72 hours.   No results for input(s): LCAD, LAC in the last 72 hours.      Assessment:  (Medical Decision Making)     Hospital Problems  Date Reviewed: 4/28/2017          Codes Class Noted POA    Pancreatitis-GI consult pending, NPO currently ICD-10-CM: K85.90  ICD-9-CM: 577.0  4/27/2017 Unknown        Chest pain-likely secondary pancreatitis ICD-10-CM: R07.9  ICD-9-CM: 786.50  4/27/2017 Unknown        Pleural effusion-ultrasound evaluation, add IPPBs ICD-10-CM: J90  ICD-9-CM: 511.9  4/27/2017 Unknown        Elevated troponin-likely secondary to recent surgery ICD-10-CM: R74.8  ICD-9-CM: 790.6  4/27/2017 Unknown        Atelectasis-add IPPBs ICD-10-CM: J98.11  ICD-9-CM: 518.0  4/27/2017 Unknown        BRITTNEY (obstructive sleep apnea) (Chronic)-noncompliant, will need follow up eventually ICD-10-CM: G47.33  ICD-9-CM: 327.23  3/7/2017 Yes              Plan:  (Medical Decision Making)     --add IPPBs  --wean O2  --IS  --BRITTNEY will need follow up eventually  --add Buspar to see if helps with depression/anxiety/pain  --abd ultrasound today    More than 50% of the time documented was spent in face-to-face contact with the patient and in the care of the patient on the floor/unit where the patient is located. DAVID Henry    Lungs: decreased BS in the bases  Heart:  RRR with no Murmur/Rubs/Gallops  Abd : diffuse upper abdomen tenderness     Additional Comments:  Left shoulder and arm pain is likely referred pain from pancreatitis. Will discuss with pharmacy to control pain better. May add Nucynta and cont. Prn morphine     I have spoken with and examined the patient. I agree with the above assessment and plan as documented.     Page President, MD

## 2017-04-28 NOTE — PROGRESS NOTES
Dr Compa Tapia informed of patient's left sided chest pain. New orders received for Morphine 2mg IVP. Patient and wife updated on POC.    0230 - Morphine effective. Patient has asked twice about when he can get it again. Informed patient that we will worry about the current pain and not about the frequency of pain medication.

## 2017-04-28 NOTE — PROGRESS NOTES
Problem: Falls - Risk of  Goal: *Absence of falls  Outcome: Progressing Towards Goal  Pt progressing towards goal. No falls since admission. Bed low and locked. Call light within reach. Side rails x 2. Gripper socks applied. Personal belongings within reach. Pt verbalizes understanding to call for assistance. Limited mobility in right arm post valve surgery. Will call for assistance.     Bed alarm turned on for safety concerns with medications and confusion on previous admission

## 2017-04-28 NOTE — PROGRESS NOTES
Attempted to call spouse and notify her of transfer to 18, will continue to follow up.  Family in room including mother and visitors notified of transfer orders    Beronica Cooper [phone]

## 2017-04-28 NOTE — PROGRESS NOTES
Pt continues to call RN requesting pain medications. IV Morphine admin q 2 hours PRN since beginning of shift per pt request for severe pain (see MAR). Pt requesting more pain medication, Ultram offered as ordered, pt has refused ultram on multiple occasions and continues to refuse. These pain concerns has been discussed with Dr. Samaria Pryor, Dr. Michael Welch, and Ly Dowd in pharmacy. Order received for fentanyl patch, patch applied to LUE and reinforced with tegaderm (see MAR). Zofran admin per request for nausea. Patient's mother at bedside, discussed in detail the plan of care with patient mother and wife. Will continue to monitor closely. Call bell within reach.

## 2017-04-28 NOTE — PROGRESS NOTES
Problem: Mobility Impaired (Adult and Pediatric)  Goal: *Acute Goals and Plan of Care (Insert Text)  LTG:  (1.)Mr. Lawyer Haile will move from supine to sit and sit to supine , scoot up and down and roll side to side in bed with INDEPENDENCE within 7 day(s). (2.)Mr. Blanton will ambulate with INDEPENDENCE for >or= 500 feet with no device, appropriate gait pattern and good dynamic standing balance taking standing rest breaks when needed within 7 day(s). (3.)Mr. Lawyer Haile will perform B LE therapeutic exercises x 20 reps with INDEPENDENCE within 7 days to increase strength for improved safety and independence in transfers and gait. (4.)Mr. Blanton will ascend/descend 4 steps with 1 handrail(s) and INDEPENDENCE within 7 day(s) for safe entry/exit of home. PHYSICAL THERAPY: INITIAL ASSESSMENT, AM 4/28/2017  INPATIENT: Hospital Day: 2  Payor: BLUE CROSS / [place] [facility] / Product Type: PPO /      NAME/AGE/GENDER: Leila Colvin is a 50 y.o. male           PRIMARY DIAGNOSIS: Pleural effusion [J90] <principal problem not specified> <principal problem not specified>  Procedure(s) (LRB):  ULTRASOUND (Bilateral)  Day of Surgery  ICD-10: Treatment Diagnosis:       · Other abnormalities of gait and mobility (R26.89)   Precaution/Allergies:  Amoxil [amoxicillin]; Codeine; Dilaudid [hydromorphone (bulk)]; and Nucynta [tapentadol]       ASSESSMENT:      Mr. Lawyer Haile presents sitting in recliner with wife at bedside. He was recently in acute care for MVR, now readmitted with pancreatitis and experiencing severe pain requiring morphine per RN. He had decreased R shoulder AROM due to recent MVR. Nasal cannula off as patient had just ambulated with wife. Patient stood with modified independence and ambulated 250' with supervision and slow lg, stopped to rest several times due to dyspnea. HR increased from 76 to 85 and SpO2 remained 90% on room air. Left in chair with nasal cannula on. Patient with decreased activity tolerance due to multiple medical problems. He is young and would benefit from skilled PT to progress his activity tolerance and safety with all mobility. Encouraged patient and wife to ambulate in hallway several times/day and they appeared to understand. This section established at most recent assessment   PROBLEM LIST (Impairments causing functional limitations):  1. Decreased Ambulation Ability/Technique  2. Decreased Activity Tolerance  3. Decreased Work Simplification/Energy Conservation Techniques  4. Increased Fatigue  5. Increased Shortness of Breath    INTERVENTIONS PLANNED: (Benefits and precautions of physical therapy have been discussed with the patient.)  1. Balance Exercise  2. Bed Mobility  3. Gait Training  4. Therapeutic Activites  5. Therapeutic Exercise/Strengthening  6. education  7. Group Therapy      TREATMENT PLAN: Frequency/Duration: 2 times a week for 1 week  Rehabilitation Potential For Stated Goals: EXCELLENT      RECOMMENDED REHABILITATION/EQUIPMENT: (at time of discharge pending progress): None and /or cardiac rehab. HISTORY:   History of Present Injury/Illness (Reason for Referral):  Per MD note, \"Yung Hay is a 50year old CM with a PMH of GERD, HTN, BRITTNEY (does not wear CPAP at home), and recent mitral valve repair due to prolapse on 4/21/17 presented back to the ER on 4/27/17 with sharp left sided chest pain, worse with inspiration, that radiates into his neck and is worse with reclining. He also complains of nausea and abdominal tenderness for 1-2 days. He stated that he had the mitral valve repair and has been doing well since then, until 2 days ago when he first started noticing the chest pain. When it didn't go away, he came to the ER to be evaluated. In the ER he was found to have a lipase of 953, pleural effusions on CT scan, and mildly elevated troponin.  \"  Past Medical History/Comorbidities:   Mr. Alize Hay has a past medical history of Acute mitral insufficiency; Difficult intubation; GERD (gastroesophageal reflux disease); History of kidney stones (1982); History of kidney stones; History of Jean [place] spotted fever (1983); Hypertension; Mitral regurgitation; Obesity (BMI 30-39.9) (04/17/2017); Pancreatic cyst; PONV (postoperative nausea and vomiting); Right shoulder pain (4/24/2017); Sleep apnea; and Thyroid nodule. Mr. Jame Merlin  has a past surgical history that includes urological (1983); heent; sinus surgery proc unlisted (2002); lap cholecystectomy (2009); ercp; and cardiac surg procedure unlist.  Social History/Living Environment:   Home Environment: Private residence  # Steps to Enter: 3  One/Two Story Residence: Two story, live on 1st floor  Living Alone: No  Support Systems: Family member(s), Friends \ neighbors  Patient Expects to be Discharged to[de-identified] Private residence  Current DME Used/Available at Home: None  Prior Level of Function/Work/Activity:  Lives at home with wife, independent in home and community,  drives. Works at [employer] .  Per patient he has declined in functional mobility over past couple months. Number of Personal Factors/Comorbidities that affect the Plan of Care: 1-2: MODERATE COMPLEXITY   EXAMINATION:   Most Recent Physical Functioning:   Gross Assessment:  AROM: Within functional limits  Strength: Within functional limits  Sensation: Intact               Posture:  Posture (WDL): Exceptions to WDL  Posture Assessment:  Forward head, Rounded shoulders  Balance:  Sitting: Intact  Standing: Intact Bed Mobility:     Wheelchair Mobility:     Transfers:  Sit to Stand: Modified independent  Stand to Sit: Modified independent  Gait:     Speed/Tracey: Slow  Step Length: Right shortened;Left shortened  Distance (ft): 250 Feet (ft) (with 4 standing rest breaks)  Ambulation - Level of Assistance: Supervision       Body Structures Involved:  1. Heart  2. Lungs  3. Endocrine  4. Joints  5. Muscles  6. Ligaments Body Functions Affected:  1. Cardio  2. Respiratory  3. Neuromusculoskeletal  4. Movement Related  5. Metobolic/Endocrine Activities and Participation Affected:  1. Mobility  2. Self Care  3. Domestic Life  4. Community, Social and [place]   Number of elements that affect the Plan of Care: 4+: HIGH COMPLEXITY   CLINICAL PRESENTATION:   Presentation: Evolving clinical presentation with changing clinical characteristics: MODERATE COMPLEXITY   CLINICAL DECISION MAKIN [facility] Mobility Inpatient Short Form  How much difficulty does the patient currently have. .. Unable A Lot A Little None   1. Turning over in bed (including adjusting bedclothes, sheets and blankets)? [ ] 1   [ ] 2   [X] 3   [ ] 4   2. Sitting down on and standing up from a chair with arms ( e.g., wheelchair, bedside commode, etc.)   [ ] 1   [ ] 2   [ ] 3   [X] 4   3. Moving from lying on back to sitting on the side of the bed? [ ] 1   [ ] 2   [X] 3   [ ] 4   How much help from another person does the patient currently need. .. Total A Lot A Little None   4. Moving to and from a bed to a chair (including a wheelchair)? [ ] 1   [ ] 2   [ ] 3   [X] 4   5. Need to walk in hospital room? [ ] 1   [ ] 2   [X] 3   [ ] 4   6. Climbing 3-5 steps with a railing? [ ] 1   [ ] 2   [X] 3   [ ] 4   © , Trustees of [address] [place] [address] [address] [address], under license to [employer]. All rights reserved    Score:  Initial: 20 Most Recent: X (Date: -- )     Interpretation of Tool:  Represents activities that are increasingly more difficult (i.e. Bed mobility, Transfers, Gait).        Score 24 23 22-20 19-15 14-10 9-7 6       Modifier CH CI CJ CK CL CM CN         · Mobility - Walking and Moving Around:               - CURRENT STATUS:    CJ - 20%-39% impaired, limited or restricted               - GOAL STATUS:           CI - 1%-19% impaired, limited or restricted               - D/C STATUS:                       ---------------To be determined---------------  Payor[place] BLUE CR[place] / [place] [facility] / Product Type: PPO /       Medical Necessity:     · Patient is expected to demonstrate progress in functional technique and activity tolerance to increase independence with   and improve safety during all functional mobility. Reason for Services/Other Comments:  · Patient continues to require skilled intervention due to medical complications and decline in functional mobility. Use of outcome tool(s) and clinical judgement create a POC that gives a: Clear prediction of patient's progress: LOW COMPLEXITY                 TREATMENT:   (In addition to Assessment/Re-Assessment sessions the following treatments were rendered)   Pre-treatment Symptoms/Complaints:    Pain: Initial:   Pain Intensity 1: 0  Post Session:  unchanged      Assessment/Reassessment only, no treatment provided today     Braces/Orthotics/Lines/Etc:   · IV  · Eagle monitor  · O2 Device: Nasal cannula  Treatment/Session Assessment:    · Response to Treatment:  dyspneic  · Interdisciplinary Collaboration:  · Physical Therapist  · Registered Nurse  · After treatment position/precautions:  · Up in chair  · Bed/Chair-wheels locked  · Call light within reach  · RN notified  · Family at bedside  · Compliance with Program/Exercises: Will assess as treatment progresses. · Recommendations/Intent for next treatment session: \"Next visit will focus on advancements to more challenging activities and reduction in assistance provided\".   Total Treatment Duration:  PT Patient Time In/Time Out  Time In: 0958  Time Out: [address] PT

## 2017-04-28 NOTE — PROGRESS NOTES
Bedside and Verbal shift change report given to Farideh Liu RN (oncoming nurse) by self Chance Almodovar nurse). Report included the following information SBAR, Kardex, MAR and Recent Results.

## 2017-04-28 NOTE — PROGRESS NOTES
Problem: Self Care Deficits Care Plan (Adult)  Goal: *Acute Goals and Plan of Care (Insert Text)  1. Julio Cardoza will complete gentle UE home program independently. 2. Julio Cardoza will complete total body dressing/bathing independently. 3. Julio Cardoza will participate at least 30 minutes of ADL with 3 or less rest breaks. Time frame: 4 - 7 visits      OCCUPATIONAL THERAPY: Initial Assessment 4/28/2017  INPATIENT: Hospital Day: 2  Payor: Diego Band / Plan: [facility] [employer] [facility] / Product Type: PPO /      NAME/AGE/GENDER: Julio Cardoza is a 50 y.o. male           PRIMARY DIAGNOSIS:  Pleural effusion [J90] Pancreatitis Pancreatitis  Procedure(s) (LRB):  ULTRASOUND (Bilateral)  Day of Surgery  ICD-10: Treatment Diagnosis:        · Generalized Muscle Weakness (M62.81)   Precautions/Allergies:         Amoxil [amoxicillin]; Codeine; Dilaudid [hydromorphone (bulk)]; and Nucynta [tapentadol]       ASSESSMENT:      Mr. Iqra Kennedy presents with workup for pancreatitis with recent history/admission for mitral valve replacement. Last admission he complained of RUE pain/stiffness/weakness and was given home UE program + kinesiotaping of R shoulder with OT. Removed kinesiotaping as OT instructed patient to remove after 4 days. Also, patient given heat pack placed over posterior shoulder (Dr. Pham kennedy) as he is complaining of shoulder pain. Patient left up in chair. RUE is doing better, but he would still benefit from light home UE exercise program.  Julio Cardoza presents with the following problems and would benefit from occupational therapy to maximize independence with self-care,instrumental activities of daily living, and functional transfers/mobility for activities of daily living/instrumental activities of daily living via the stated goals. This section established at most recent assessment   PROBLEM LIST (Impairments causing functional limitations):  1.  Decreased Strength  2. Decreased ADL/Functional Activities  3. Decreased Balance  4. Increased Pain  5. Decreased Activity Tolerance  6. Decreased Flexibility/Joint Mobility  7. Decreased Union with Home Exercise Program    INTERVENTIONS PLANNED: (Benefits and precautions of occupational therapy have been discussed with the patient.)  1. Activities of daily living training  2. Theraputic activity  3. Theraputic exercise      TREATMENT PLAN: Frequency/Duration: Follow patient 3 times/week to address above goals. Rehabilitation Potential For Stated Goals: GOOD      RECOMMENDED REHABILITATION/EQUIPMENT: (at time of discharge pending progress): Continue Skilled Therapy. OCCUPATIONAL PROFILE AND HISTORY:   History of Present Injury/Illness (Reason for Referral):  Per MD H & P: Luis Manuel Mathias is a 50year old CM with a PMH of GERD, HTN, BRITTNEY (does not wear CPAP at home), and recent mitral valve repair due to prolapse on 4/21/17 presented back to the ER on 4/27/17 with sharp left sided chest pain, worse with inspiration, that radiates into his neck and is worse with reclining. He also complains of nausea and abdominal tenderness for 1-2 days. He stated that he had the mitral valve repair and has been doing well since then, until 2 days ago when he first started noticing the chest pain. When it didn't go away, he came to the ER to be evaluated. Past Medical History/Comorbidities:   Mr. Margret Knott  has a past medical history of Acute mitral insufficiency; Difficult intubation; GERD (gastroesophageal reflux disease); History of kidney stones (1982); History of kidney stones; History of Jean [place] spotted fever (1983); Hypertension; Mitral regurgitation; Obesity (BMI 30-39.9) (04/17/2017); Pancreatic cyst; PONV (postoperative nausea and vomiting); Right shoulder pain (4/24/2017); Sleep apnea; and Thyroid nodule.   Mr. Margret Knott  has a past surgical history that includes urological (1983); heent; sinus surgery proc unlisted (2002); lap cholecystectomy (2009); ercp; and cardiac surg procedure unlist.  Social History/Living Environment:   Home Environment: Private residence  # Steps to Enter: 3  One/Two Story Residence: Two story, live on 1st floor  Living Alone: No  Support Systems: Family member(s), Friends \ neighbors  Patient Expects to be Discharged to[de-identified] Private residence  Current DME Used/Available at Home: None  Prior Level of Function/Work/Activity:  Typically independent with ADL/instrumental ADL/work. Works as a  at [employer]. Dominant Side:         RIGHT  Previous Treatment Approaches:          Acute OT/PT: received gentle RUE home program last admission. Number of Personal Factors/Comorbidities that affect the Plan of Care: Brief history (0):  LOW COMPLEXITY   ASSESSMENT OF OCCUPATIONAL PERFORMANCE[de-identified]   Activities of Daily Living:           Basic ADLs (From Assessment) Complex ADLs (From Assessment)   Basic ADL  Feeding: Independent  Oral Facial Hygiene/Grooming: Modified Independent  Bathing: Modified independent  Upper Body Dressing: Stand-by assistance  Lower Body Dressing: Supervision  Toileting: Modified independent     Grooming/Bathing/Dressing Activities of Daily Living     Cognitive Retraining  Safety/Judgement: Awareness of environment                       Bed/Mat Mobility  Supine to Sit: Modified independent  Sit to Stand: Modified independent  Scooting: Modified independent          Most Recent Physical Functioning:   Gross Assessment:  AROM: Generally decreased, functional (R shoulder limited slightly by pain/stiffness)  Strength: Generally decreased, functional (/external rotation 4/5)               Posture:  Posture (WDL): Exceptions to WDL  Posture Assessment:  Forward head, Rounded shoulders  Balance:  Sitting: Intact  Standing: Intact Bed Mobility:  Supine to Sit: Modified independent  Scooting: Modified independent  Wheelchair Mobility:     Transfers:  Sit to Stand: Modified independent  Stand to Sit: Modified independent                    Patient Vitals for the past 6 hrs:       BP BP Patient Position SpO2 O2 Flow Rate (L/min) Pulse   17 1019 - - 96 % 2 l/min -   17 1155 105/60 Head of bed elevated (Comment degrees) 95 % - 76        Mental Status  Neurologic State: Alert  Orientation Level: Oriented to person, Oriented to place  Cognition: Follows commands  Perception: Appears intact  Perseveration: No perseveration noted  Safety/Judgement: Awareness of environment                               Physical Skills Involved:  1. Range of Motion  2. Mobility  3. Strength  4. Activity tolerance Cognitive Skills Affected (resulting in the inability to perform in a timely and safe manner): 1. none noted Psychosocial Skills Affected:  1. Habits   Number of elements that affect the Plan of Care: 1-3:  LOW COMPLEXITY   CLINICAL DECISION MAKIN[address] [place] [address] AM-PAC 6 Clicks   Basic Mobility Inpatient Short Form  How much help from another person does the patient currently need. .. Total A Lot A Little None   1. Putting on and taking off regular lower body clothing?   [ ] 1   [ ] 2   [X] 3   [ ] 4   2. Bathing (including washing, rinsing, drying)? [ ] 1   [ ] 2   [X] 3   [ ] 4   3. Toileting, which includes using toilet, bedpan or urinal?   [ ] 1   [ ] 2   [X] 3   [ ] 4   4. Putting on and taking off regular upper body clothing?   [ ] 1   [ ] 2   [ ] 3   [X] 4   5. Taking care of personal grooming such as brushing teeth? [ ] 1   [ ] 2   [ ] 3   [X] 4   6. Eating meals? [ ] 1   [ ] 2   [ ] 3   [X] 4   © , Trustees of [address] [place] [address], under license to [employer]. All rights reserved    Score:  Initial: 21 Most Recent: X (Date: -- )     Interpretation of Tool:  Represents activities that are increasingly more difficult (i.e. Bed mobility, Transfers, Gait).        Score 24 23 22-20 19-15 14-10 9-7 6       Modifier CH CI CJ CK CL CM CN         · Self Care:               - CURRENT STATUS:    CJ - 20%-39% impaired, limited or restricted               - GOAL STATUS:           CH - 0% impaired, limited or restricted               - D/C STATUS:                       ---------------To be determined---------------  Payor: BLUE CROSS / [place] OF [address] [place] [address] Rd / Product Type: PPO /       Medical Necessity:     · Patient demonstrates good rehab potential due to higher previous functional level. Reason for Services/Other Comments:  · Patient continues to require skilled intervention due to decreased independence with ADL, instrumental ADL, and work tasks. Use of outcome tool(s) and clinical judgement create a POC that gives a: LOW COMPLEXITY             TREATMENT:   (In addition to Assessment/Re-Assessment sessions the following treatments were rendered)      Pre-treatment Symptoms/Complaints:    Pain: Initial:   Pain Intensity 1:  (complains of L shoulder/chest pain)  Pain Intervention(s) 1: Heat applied  Post Session:  same      Assessment/Reassessment only, no treatment provided today     Braces/Orthotics/Lines/Etc:   · O2 Device: Nasal cannula  Treatment/Session Assessment:    · Response to Treatment:  No treatment rendered. Assessment only. · Interdisciplinary Collaboration:  · Occupational Therapist  · Registered Nurse  · MD  · After treatment position/precautions:  · Up in chair  · Bed/Chair-wheels locked  · Call light within reach  · RN notified  · Family at bedside  · Compliance with Program/Exercises: Will assess as treatment progresses. · Recommendations/Intent for next treatment session: \"Next visit will focus on advancements to more challenging activities\".   Total Treatment Duration:  OT Patient Time In/Time Out  Time In: 1212  Time Out: [address] [place] [address] MARTINEZ Rodriguez OTR/L

## 2017-04-28 NOTE — PROGRESS NOTES
TRANSFER - OUT REPORT:    Verbal report given to LIZ Doyle on Mayela Bassett  being transferred to 8th Floor for routine progression of care       Report consisted of patients Situation, Background, Assessment and Recommendations(SBAR). Information from the following report(s) SBAR, Kardex, [facility] and Recent Results was reviewed with the receiving nurse. Opportunity for questions and clarification was provided.

## 2017-04-28 NOTE — PROGRESS NOTES
Pain persistent despite Ultram and Morphine. Discussed pain control previously with Dr Gonzalez Darby - pain most likely radiating from pancreatitis. Due to pancreatitis, pain control now discussed with Dr Aneta Tena. Discussed events of entire shift. New orders received for Morphine 4mg now and increased frequency of PRN Morphine. Patient and wife updated on POC.

## 2017-04-28 NOTE — PROGRESS NOTES
Dilaudid and Nycenta cause severe confusion per wife.   Both medications added to allergy list and discontinued per protocol

## 2017-04-28 NOTE — CONSULTS
[address] Cardiology Consult                Date of  Admission: 4/27/2017  3:15 PM     Primary Care Physician: Dr. Stormy Patel  Primary Cardiologist: Dr. Shanti Hernandez  Referring Physician: Dr. Hubert Wood Physician: Dr. Shanti Hernandez    CC/Reason for consult: Recent MV repair      Jose Márquez is a 50 y.o. male with recent MVR on 4/22/17 (Minimally invasive mitral valve repair with P2 resection and 32 mm Physio II annuloplasty ring), GERD, HTN, BRITTNEY (doesn't wear CPAP at home). Patient presented to ED at [facility] with c/o chest pain x 2 days that would not go away. In ED, labs showed lipase 953, CT scan with no PE but with pleural effusions and trop of . 17. Hospital med admitted to [facility] for chest pain/pancreatitis. [facility] consulted for pleural effusions with thoracentesis planned for this am.  Cardiology was consulted for recent MV repair. Second trop . 15.             Diagnosis    Mitral regurgitation    Essential hypertension    TIA (transient ischemic attack)    Shortness of breath    BRITTNEY (obstructive sleep apnea)    GERD (gastroesophageal reflux disease)    Mitral insufficiency    Mitral valve regurgitation    Thyroid nodule    Right shoulder pain    Pancreatitis    Chest pain    Pleural effusion    Elevated troponin    Atelectasis       Past Medical History:   Diagnosis Date    Acute mitral insufficiency     Difficult intubation     \"anterior larynx?  glide scope and blue bougie guide helpful per Dr Gayla Dickerson ENT    GERD (gastroesophageal reflux disease)     daily med and HOB elevated 4\" and sleeps on 3 pillows    History of kidney stones 1982    History of kidney stones     History of [place] spotted fever 1983    Hypertension     no longer takes lisinopril, only Lasix    Mitral regurgitation     Obesity (BMI 30-39.9) 04/17/2017    BMI 37    Pancreatic cyst     PONV (postoperative nausea and vomiting)     with sinus sx    Right shoulder pain 4/24/2017    Acute on chronic (old injury)    Sleep apnea     Does not use CPAP    Thyroid nodule       Past Surgical History:   Procedure Laterality Date    CARDIAC SURG PROCEDURE UNLIST      mitral valve repair    HX ERCP      removal of pancreatic cyst-    HX HEENT      lymphnode removed from L neck    HX LAP CHOLECYSTECTOMY  2009    HX UROLOGICAL  1983    cystoto remove kidney stone    SINUS SURGERY PROC UNLISTED  2002     Allergies   Allergen Reactions    Amoxil [Amoxicillin] Other (comments)     Upset stomach and diarrhea    Codeine Nausea and Vomiting    Dilaudid [Hydromorphone (Bulk)] Nausea and Vomiting    Nucynta [Tapentadol] Other (comments)     Confusion      Family History   Problem Relation Age of Onset    Adopted: Yes    Family history unknown: Yes        Current Facility-Administered Medications   Medication Dose Route Frequency    morphine injection 2 mg  2 mg IntraVENous Q2H PRN    aspirin delayed-release tablet 81 mg  81 mg Oral 7am    ibuprofen (MOTRIN) tablet 800 mg  800 mg Oral QID    methyl salicylate-menthol (BENGAY) 15-10 % cream   Topical Q6H PRN    metoprolol tartrate (LOPRESSOR) tablet 25 mg  25 mg Oral Q12H    pantoprazole (PROTONIX) tablet 40 mg  40 mg Oral ACB    sodium chloride (NS) flush 5-10 mL  5-10 mL IntraVENous Q8H    sodium chloride (NS) flush 5-10 mL  5-10 mL IntraVENous PRN    naloxone (NARCAN) injection 0.4 mg  0.4 mg IntraVENous PRN    ondansetron (ZOFRAN) injection 4 mg  4 mg IntraVENous Q4H PRN    lactated ringers infusion  150 mL/hr IntraVENous CONTINUOUS    traMADol (ULTRAM) tablet 50 mg  50 mg Oral Q6H PRN    lip protectant (BLISTEX) ointment   Topical PRN    temazepam (RESTORIL) capsule 15 mg  15 mg Oral QHS PRN       Review of Systems   Constitution: Negative for diaphoresis, weakness and malaise/fatigue. HENT: Negative for congestion and headaches. Cardiovascular: Positive for chest pain.  Negative for claudication, cyanosis, dyspnea on exertion, irregular heartbeat, leg swelling, near-syncope, orthopnea, palpitations, paroxysmal nocturnal dyspnea and syncope. Respiratory: Positive for shortness of breath. Negative for cough and wheezing. Endocrine: Negative for cold intolerance and heat intolerance. Hematologic/Lymphatic: Does not bruise/bleed easily. Skin: Negative for nail changes. Gastrointestinal: Positive for abdominal pain. Neurological: Negative for dizziness.           Physical Exam  Vitals:    04/27/17 1927 04/28/17 0136 04/28/17 0434 04/28/17 0620   BP: 115/70 109/68  104/63   Pulse: 100 75 86 85   Resp: 18 16  16   Temp: 97.8 °F (36.6 °C) 97.8 °F (36.6 °C)  97.6 °F (36.4 °C)   SpO2: 96% 91%  94%   Weight: 114.1 kg (251 lb 8 oz)   116.7 kg (257 lb 3.2 oz)   Height: 5' 10\" (1.778 m)          Physical Exam:  General: Well Developed, Well Nourished, No Acute Distress  HEENT: pupils equal and round, no abnormalities noted  Neck: supple, no JVD, no carotid bruits  Heart: S1S2 with RRR without murmurs or gallops  Lungs: Clear throughout auscultation bilaterally without adventitious sounds  Abd: soft, + diffuse tenderness, nondistended, with good bowel sounds  Ext: warm, no edema, calves supple/nontender, pulses 2+ bilaterally  Skin: warm and dry  Psychiatric: Normal mood and affect  Neurologic: Alert and oriented X 3      Cardiographics    Telemetry: SR  ECG: NSR with no acute ischemia  Echocardiogram: ordered    Labs:   Recent Labs      04/27/17   2150  04/27/17   1413   NA   --   142   K   --   4.0   MG   --   2.1   BUN   --   11   CREA   --   1.21   GLU   --   83   WBC   --   9.4   HGB   --   11.7*   HCT   --   32.4*   PLT   --   276   TGL   --   155*   TROIQ  0.15*  0.17*       Lab Results   Component Value Date/Time    Cholesterol, total 156 03/07/2017 04:20 AM    HDL Cholesterol 31 03/07/2017 04:20 AM    LDL, calculated 84 03/07/2017 04:20 AM    VLDL, calculated 41 03/07/2017 04:20 AM    Triglyceride 155 04/27/2017 02:13 PM    CHOL/HDL Ratio 5.0 03/07/2017 04:20 AM       Recent Labs      04/27/17   2150  04/27/17   1413   TROIQ  0.15*  0.17*       All Cardiac Markers in the last 24 hours:    Lab Results   Component Value Date/Time    TROIQ 0.15 () 04/27/2017 09:50 PM    TROIQ 0.17 ([place]) 04/27/2017 02:13 PM         Assessment/Plan:      Active Problems:    BRITTNEY (obstructive sleep apnea) (3/7/2017)- per [facility]      Pancreatitis (4/27/2017)- per [facility]      Chest pain (4/27/2017)- atypical; will order echo then further recommendations pending clinical course      Pleural effusion (4/27/2017)- possible thoracentesis this am      Elevated troponin (4/27/2017)- demand ischemia; recent C with normal coronary arteries. Echo today      Atelectasis (4/27/2017)- see above        Thank you very much for this referral. We appreciate the opportunity to participate in this patient's care. We will follow along with above stated plan.       Rosemary Ovalles MD

## 2017-04-28 NOTE — CONSULTS
[facility] Consult Note       Primary GI Physician: Dr. Laith Arreguin    Referring Physician: Katie Mojica Date:  4/28/2017    Admit Date:  4/27/2017    Chief Complaint:  Dr. Corona Licea    Subjective:     History of Present Illness:  Patient is a 50 y.o. male with PMH of GERD, nephrolithiasis, HTN, MR, BRITTNEY, pancreatic cyst w hx pancreatitis, who is seen in consultation at the request of Dr. Corona Licea for acute pancreatitis. Lipase elevated with mild transaminitis. Pt is known to Dr. Tiana Blue and underwent EGD for dysphagia and reflux on 9/28/16 which was normal with normal esophageal bx and empiric dilation. Has hx of pancreatic cyst in neck of pancreas. EUS 11/2009 showed 10x6mm cyst with CEA level 117 on FNA (>200 is high risk). This lesion has shown stability on further MRI imaging (last MRI 2016). Pt presented to ER 4/27/17 with sharp left sided chest pain, worse with inspiration, that radiates into his neck and is worse with reclining. He also complains of nausea and abdominal tenderness for 1-2 days. He stated that he had the mitral valve repair and has been doing well since then, until 2 days ago when he first started noticing the chest pain. When it didn't go away, he came to the ER to be evaluated. Chest pain seems to involve his entire chest and upper abdomen and was described as excruciating. It was associated with Nausea but no vomting. In the ER he was found to have a lipase of 953, pleural effusions on CT scan, and mildly elevated troponin, and Mild transaminitis. He has previously had a cholecystectomy. He denies changes in bowel habits , melena, hematochezia. He is a non-drinker. Prior to mitral valve surgery, his only medication was Acyclovir daily. He continues to have some sx of dysphagia despite empiric dilation sept 2016. CT abd/pelvis 4/27/17: IMPRESSION: 1. No evidence of central or segmental pulmonary embolism.   2. Moderate right pleural effusion and adjacent moderately extensive atelectasis/infiltrate within the right lower lobe. 3. Mild areas of atelectasis/infiltrate within the left lower lobe and right middle lobe. 4. Pancreatic cyst or other low-density mass. This could represent the sequela of previous pancreatitis. Documentation of stability would be recommended with a follow-up CT scan using a dedicated pancreatic mass protocol in 6 months. PMH:  Past Medical History:   Diagnosis Date    Acute mitral insufficiency     Difficult intubation     \"anterior larynx?  glide scope and blue bougie guide helpful per Dr Gayla Dickerson ENT    GERD (gastroesophageal reflux disease)     daily med and HOB elevated 4\" and sleeps on 3 pillows    History of kidney stones 1982    History of kidney stones     History of [place] spotted fever 1983    Hypertension     no longer takes lisinopril, only Lasix    Mitral regurgitation     Obesity (BMI 30-39.9) 04/17/2017    BMI 37    Pancreatic cyst     PONV (postoperative nausea and vomiting)     with sinus sx    Right shoulder pain 4/24/2017    Acute on chronic (old injury)    Sleep apnea     Does not use CPAP    Thyroid nodule        PSH:  Past Surgical History:   Procedure Laterality Date    CARDIAC SURG PROCEDURE UNLIST      mitral valve repair    HX ERCP      removal of pancreatic cyst-    HX HEENT      lymphnode removed from L neck    HX LAP CHOLECYSTECTOMY  2009   [address]    cystoto remove kidney stone    SINUS SURGERY PROC UNLISTED  2002       Allergies: Allergies   Allergen Reactions    Amoxil [Amoxicillin] Other (comments)     Upset stomach and diarrhea    Codeine Nausea and Vomiting    Dilaudid [Hydromorphone (Bulk)] Nausea and Vomiting    Nucynta [Tapentadol] Other (comments)     Confusion       Home Medications:  Prior to Admission medications    Medication Sig Start Date End Date Taking?  Authorizing Provider   traMADol-acetaminophen (ULTRACET) 37.5-325 mg per tablet Take 1 Tab by mouth every six (6) hours as needed for Pain. Max Daily Amount: 4 Tabs. 4/26/17   Leora Taveras NP   ibuprofen (MOTRIN) 800 mg tablet Take 1 Tab by mouth four (4) times daily for 14 days. 4/25/17 5/9/17  Leora Taveras NP   methyl salicylate-menthol (BENGAY) 15-10 % topical cream Over the counter BenGay use prn 4/25/17   Leora Taveras NP   metoprolol tartrate (LOPRESSOR) 25 mg tablet Take 1 Tab by mouth every twelve (12) hours. 4/25/17   Leora Taveras NP   potassium chloride (K-DUR, KLOR-CON) 20 mEq tablet Take 1 Tab by mouth daily for 6 days. 4/25/17 5/1/17  Leora Taveras NP   furosemide (LASIX) 40 mg tablet Take 1 Tab by mouth daily. 4/25/17   Leora Taveras NP   aspirin delayed-release 81 mg tablet Take 81 mg by mouth every morning. Historical Provider   omeprazole (PRILOSEC) 40 mg capsule Take 40 mg by mouth every morning.     Historical Provider       Hospital Medications:  Current Facility-Administered Medications   Medication Dose Route Frequency    morphine injection 2 mg  2 mg IntraVENous Q2H PRN    aspirin delayed-release tablet 81 mg  81 mg Oral 7am    ibuprofen (MOTRIN) tablet 800 mg  800 mg Oral QID    methyl salicylate-menthol (BENGAY) 15-10 % cream   Topical Q6H PRN    metoprolol tartrate (LOPRESSOR) tablet 25 mg  25 mg Oral Q12H    pantoprazole (PROTONIX) tablet 40 mg  40 mg Oral ACB    sodium chloride (NS) flush 5-10 mL  5-10 mL IntraVENous Q8H    sodium chloride (NS) flush 5-10 mL  5-10 mL IntraVENous PRN    naloxone (NARCAN) injection 0.4 mg  0.4 mg IntraVENous PRN    ondansetron (ZOFRAN) injection 4 mg  4 mg IntraVENous Q4H PRN    lactated ringers infusion  150 mL/hr IntraVENous CONTINUOUS    traMADol (ULTRAM) tablet 50 mg  50 mg Oral Q6H PRN    lip protectant (BLISTEX) ointment   Topical PRN    temazepam (RESTORIL) capsule 15 mg  15 mg Oral QHS PRN       Social History:  Social History   Substance Use Topics    Smoking status: Never Smoker    Smokeless tobacco: Never Used    Alcohol use No       Pt denies any history of drug use, blood transfusions, or tattoos. Family History:  Family History   Problem Relation Age of Onset    Adopted: Yes    Family history unknown: Yes       Review of Systems:  A detailed 10 system ROS is obtained, with pertinent positives as listed above. All others are negative. Diet:  NPO    Objective:     Physical Exam:  Vitals:  Visit Vitals    /71 (BP Patient Position: Head of bed elevated (Comment degrees))    Pulse 85    Temp 97.7 °F (36.5 °C)    Resp 16    Ht 5' 10\" (1.778 m)    Wt 116.7 kg (257 lb 3.2 oz)    SpO2 93%    BMI 36.9 kg/m2     Gen:  Pt is alert, cooperative, no acute distress  Skin:  Extremities and face reveal no rashes. HEENT: Sclerae anicteric. Extra-occular muscles are intact. No oral ulcers. No abnormal pigmentation of the lips. The neck is supple. Cardiovascular: not examined, undergoing echo  Respiratory:  not examined, undergoing echo  GI:  not examined, undergoing echo  Rectal:  Deferred  Musculoskeletal:  No pitting edema of the lower legs. Neurological:  Gross memory appears intact. Patient is alert and oriented. Psychiatric:  Mood appears appropriate with judgement intact.       Laboratory:    Recent Labs      04/28/17   0622  04/27/17   1413   WBC   --   9.4   HGB   --   11.7*   HCT   --   32.4*   PLT   --   276   MCV   --   87.3   NA   --   142   K   --   4.0   CL   --   105   CO2   --   28   BUN   --   11   CREA   --   1.21   CA   --   8.5   MG   --   2.1   GLU   --   83   AP   --   56   SGOT   --   63*   ALT   --   79*   TBILI   --   0.9   ALB   --   3.1*   TP   --   6.7   LPSE  917*  935*          Assessment:     Active Problems:    BRITTNEY (obstructive sleep apnea) (3/7/2017)      Pancreatitis (4/27/2017)      Chest pain (4/27/2017)      Pleural effusion (4/27/2017)      Elevated troponin (4/27/2017)      Atelectasis (4/27/2017)    50 y.o. male with PMH of GERD, nephrolithiasis, HTN, MR, BRITTNEY, hx stable pancreatic cyst, and recent mitral valve repair, who was admitted for CP and upper abd pain with mildly elevated troponinisseen in consultation at the request of Dr. Deisy Cabello for acute pancreatitis. Lipase elevated but no mention of pancreatitis on CTA chest. Mild transaminitis- ?biliary pancreatitis. Pt is a non-drinker. Echocardiogram in progress. Plan:     - continue bowel rest, pain control, and aggressive IVF  - agree with echo  - abd ultrasound due to transaminitis to assess for biliary pancreatitis. - check Lipase and LFTs in am.     DAVID Frausto      Patient is seen and examined in collaboration with Dr. Foster Banks. Assessment and plan as per Dr. Foster Banks.

## 2017-04-28 NOTE — ROUTINE PROCESS
Bilateral posterior ultrasounds per Dr Bran Lindsay with emphasis on R. Minimal fluid to warrant thoracentesis per Dr Bran Lindsay. Picture on right.

## 2017-04-29 LAB
ANION GAP BLD CALC-SCNC: 7 MMOL/L (ref 7–16)
BASOPHILS # BLD AUTO: 0 K/UL (ref 0–0.2)
BASOPHILS # BLD: 0 % (ref 0–2)
BUN SERPL-MCNC: 16 MG/DL (ref 6–23)
CALCIUM SERPL-MCNC: 8.3 MG/DL (ref 8.3–10.4)
CHLORIDE SERPL-SCNC: 105 MMOL/L (ref 98–107)
CO2 SERPL-SCNC: 30 MMOL/L (ref 21–32)
CREAT SERPL-MCNC: 1.08 MG/DL (ref 0.8–1.5)
DIFFERENTIAL METHOD BLD: ABNORMAL
EOSINOPHIL # BLD: 0.3 K/UL (ref 0–0.8)
EOSINOPHIL NFR BLD: 4 % (ref 0.5–7.8)
ERYTHROCYTE [DISTWIDTH] IN BLOOD BY AUTOMATED COUNT: 13.3 % (ref 11.9–14.6)
GLUCOSE SERPL-MCNC: 76 MG/DL (ref 65–100)
HCT VFR BLD AUTO: 29.8 % (ref 41.1–50.3)
HGB BLD-MCNC: 9.8 G/DL (ref 13.6–17.2)
IMM GRANULOCYTES # BLD: 0.2 K/UL (ref 0–0.5)
IMM GRANULOCYTES NFR BLD AUTO: 2.7 % (ref 0–5)
LIPASE SERPL-CCNC: 911 U/L (ref 73–393)
LYMPHOCYTES # BLD AUTO: 21 % (ref 13–44)
LYMPHOCYTES # BLD: 1.6 K/UL (ref 0.5–4.6)
MCH RBC QN AUTO: 30.3 PG (ref 26.1–32.9)
MCHC RBC AUTO-ENTMCNC: 32.9 G/DL (ref 31.4–35)
MCV RBC AUTO: 92.3 FL (ref 79.6–97.8)
MONOCYTES # BLD: 0.6 K/UL (ref 0.1–1.3)
MONOCYTES NFR BLD AUTO: 8 % (ref 4–12)
NEUTS SEG # BLD: 4.9 K/UL (ref 1.7–8.2)
NEUTS SEG NFR BLD AUTO: 64 % (ref 43–78)
PLATELET # BLD AUTO: 266 K/UL (ref 150–450)
PMV BLD AUTO: 10.4 FL (ref 10.8–14.1)
POTASSIUM SERPL-SCNC: 4.3 MMOL/L (ref 3.5–5.1)
RBC # BLD AUTO: 3.23 M/UL (ref 4.23–5.67)
SODIUM SERPL-SCNC: 142 MMOL/L (ref 136–145)
WBC # BLD AUTO: 7.7 K/UL (ref 4.3–11.1)

## 2017-04-29 PROCEDURE — 83690 ASSAY OF LIPASE: CPT | Performed by: INTERNAL MEDICINE

## 2017-04-29 PROCEDURE — 36415 COLL VENOUS BLD VENIPUNCTURE: CPT | Performed by: INTERNAL MEDICINE

## 2017-04-29 PROCEDURE — 85025 COMPLETE CBC W/AUTO DIFF WBC: CPT | Performed by: INTERNAL MEDICINE

## 2017-04-29 PROCEDURE — 74011250637 HC RX REV CODE- 250/637: Performed by: INTERNAL MEDICINE

## 2017-04-29 PROCEDURE — 74011000250 HC RX REV CODE- 250: Performed by: PHYSICIAN ASSISTANT

## 2017-04-29 PROCEDURE — 94760 N-INVAS EAR/PLS OXIMETRY 1: CPT

## 2017-04-29 PROCEDURE — 94640 AIRWAY INHALATION TREATMENT: CPT

## 2017-04-29 PROCEDURE — 65660000000 HC RM CCU STEPDOWN

## 2017-04-29 PROCEDURE — 74011250636 HC RX REV CODE- 250/636: Performed by: INTERNAL MEDICINE

## 2017-04-29 PROCEDURE — 80048 BASIC METABOLIC PNL TOTAL CA: CPT | Performed by: INTERNAL MEDICINE

## 2017-04-29 PROCEDURE — 74011250637 HC RX REV CODE- 250/637: Performed by: NURSE PRACTITIONER

## 2017-04-29 PROCEDURE — 74011250637 HC RX REV CODE- 250/637: Performed by: PHYSICIAN ASSISTANT

## 2017-04-29 PROCEDURE — 76604 US EXAM CHEST: CPT | Performed by: INTERNAL MEDICINE

## 2017-04-29 RX ADMIN — ALBUTEROL SULFATE 2.5 MG: 2.5 SOLUTION RESPIRATORY (INHALATION) at 15:22

## 2017-04-29 RX ADMIN — PANTOPRAZOLE SODIUM 40 MG: 40 TABLET, DELAYED RELEASE ORAL at 10:00

## 2017-04-29 RX ADMIN — PANTOPRAZOLE SODIUM 40 MG: 40 TABLET, DELAYED RELEASE ORAL at 17:16

## 2017-04-29 RX ADMIN — BUSPIRONE HYDROCHLORIDE 5 MG: 5 TABLET ORAL at 10:00

## 2017-04-29 RX ADMIN — BUSPIRONE HYDROCHLORIDE 5 MG: 5 TABLET ORAL at 17:16

## 2017-04-29 RX ADMIN — ALBUTEROL SULFATE 2.5 MG: 2.5 SOLUTION RESPIRATORY (INHALATION) at 20:27

## 2017-04-29 RX ADMIN — METOPROLOL TARTRATE 25 MG: 25 TABLET ORAL at 09:59

## 2017-04-29 RX ADMIN — IBUPROFEN 800 MG: 200 TABLET, FILM COATED ORAL at 09:59

## 2017-04-29 RX ADMIN — Medication 10 ML: at 06:12

## 2017-04-29 RX ADMIN — ONDANSETRON 4 MG: 2 INJECTION INTRAMUSCULAR; INTRAVENOUS at 09:57

## 2017-04-29 RX ADMIN — ALBUTEROL SULFATE 2.5 MG: 2.5 SOLUTION RESPIRATORY (INHALATION) at 08:15

## 2017-04-29 RX ADMIN — Medication 5 ML: at 17:17

## 2017-04-29 RX ADMIN — SODIUM CHLORIDE, SODIUM LACTATE, POTASSIUM CHLORIDE, AND CALCIUM CHLORIDE 150 ML/HR: 600; 310; 30; 20 INJECTION, SOLUTION INTRAVENOUS at 23:26

## 2017-04-29 RX ADMIN — TEMAZEPAM 15 MG: 15 CAPSULE ORAL at 21:06

## 2017-04-29 RX ADMIN — SODIUM CHLORIDE, SODIUM LACTATE, POTASSIUM CHLORIDE, AND CALCIUM CHLORIDE 150 ML/HR: 600; 310; 30; 20 INJECTION, SOLUTION INTRAVENOUS at 17:16

## 2017-04-29 RX ADMIN — METOPROLOL TARTRATE 25 MG: 25 TABLET ORAL at 21:06

## 2017-04-29 RX ADMIN — IBUPROFEN 800 MG: 200 TABLET, FILM COATED ORAL at 17:16

## 2017-04-29 RX ADMIN — Medication 10 ML: at 21:06

## 2017-04-29 RX ADMIN — IBUPROFEN 800 MG: 200 TABLET, FILM COATED ORAL at 00:46

## 2017-04-29 RX ADMIN — ASPIRIN 81 MG: 81 TABLET, COATED ORAL at 10:01

## 2017-04-29 NOTE — PROGRESS NOTES
Morphine 2 mg IV given slow push. Will continue to monitor. 04/28/17 2232   Pain 1   Pain Scale 1 Numeric (0 - 10)   Pain Intensity 1 10   Patient Stated Pain Goal 0   Pain Onset 1 acute   Pain Location 1 Chest   Pain Orientation 1 Left   Pain Description 1 Aching; Sharp   Pain Intervention(s) 1 Medication (see MAR)

## 2017-04-29 NOTE — PROGRESS NOTES
Hospitalist Progress Note    2017  Admit Date: 2017  3:15 PM   NAME: Marcus Wright   :  1965   MRN:  [id number]   Attending: Hill Maldonado DO  PCP:  Angel Cisneros NP    SUBJECTIVE:   Mr. Eloise Hernandez is a 51 yo M admitted 17 for pancreatitis, pancreatic cyst, and chest pain. He reports today that his chest pain is much improved and he slept most of the night last night. Fentanyl patch added yesterday and he thinks this is working well. Cardiology has deemed chest pain noncardiac and have signed off. MRCP done and results pending. GI following and have advanced him to a clear diet. He reports occasional shortness of breath at times. Review of Systems negative with exception of pertinent positives noted above  PHYSICAL EXAM     Visit Vitals    /59 (BP 1 Location: Left arm, BP Patient Position: Head of bed elevated (Comment degrees))    Pulse 82    Temp 98.8 °F (37.1 °C)    Resp 20    Ht 5' 10\" (1.778 m)    Wt 116.7 kg (257 lb 3.2 oz)    SpO2 95%    BMI 36.9 kg/m2      Temp (24hrs), Av.5 °F (36.9 °C), Min:97.9 °F (36.6 °C), Max:99.2 °F (37.3 °C)    Oxygen Therapy  O2 Sat (%): 95 % (17 1019)  Pulse via Oximetry: 90 beats per minute (17 1019)  O2 Device: Nasal cannula (17 1019)  O2 Flow Rate (L/min): 2 l/min (17 1019)    Intake/Output Summary (Last 24 hours) at 17 1236  Last data filed at 17 1019   Gross per 24 hour   Intake             1500 ml   Output                0 ml   Net             1500 ml      General: No acute distress    Lungs:  CTA Bilaterally.    Heart:  Regular rate and rhythm,  No murmur, rub, or gallop  Abdomen: Soft, Non distended, mild LUQ tenderness to palpation, Positive bowel sounds  Extremities: No cyanosis, clubbing or edema  Neurologic:  No focal deficits    Recent Results (from the past 24 hour(s))   LIPASE    Collection Time: 17  7:01 AM   Result Value Ref Range    Lipase 911 (H) 73 - 393 U/L   CBC WITH AUTOMATED DIFF    Collection Time: 04/29/17  7:01 AM   Result Value Ref Range    WBC 7.7 4.3 - 11.1 K/uL    RBC 3.23 (L) 4.23 - 5.67 M/uL    HGB 9.8 (L) 13.6 - 17.2 g/dL    HCT 29.8 (L) 41.1 - 50.3 %    MCV 92.3 79.6 - 97.8 FL    MCH 30.3 26.1 - 32.9 PG    MCHC 32.9 31.4 - 35.0 g/dL    RDW 13.3 11.9 - 14.6 %    PLATELET 590 501 - 278 K/uL    MPV 10.4 (L) 10.8 - 14.1 FL    DF AUTOMATED      NEUTROPHILS 64 43 - 78 %    LYMPHOCYTES 21 13 - 44 %    MONOCYTES 8 4.0 - 12.0 %    EOSINOPHILS 4 0.5 - 7.8 %    BASOPHILS 0 0.0 - 2.0 %    IMMATURE GRANULOCYTES 2.7 0.0 - 5.0 %    ABS. NEUTROPHILS 4.9 1.7 - 8.2 K/UL    ABS. LYMPHOCYTES 1.6 0.5 - 4.6 K/UL    ABS. MONOCYTES 0.6 0.1 - 1.3 K/UL    ABS. EOSINOPHILS 0.3 0.0 - 0.8 K/UL    ABS. BASOPHILS 0.0 0.0 - 0.2 K/UL    ABS. IMM. GRANS. 0.2 0.0 - 0.5 K/UL   METABOLIC PANEL, BASIC    Collection Time: 04/29/17  7:01 AM   Result Value Ref Range    Sodium 142 136 - 145 mmol/L    Potassium 4.3 3.5 - 5.1 mmol/L    Chloride 105 98 - 107 mmol/L    CO2 30 21 - 32 mmol/L    Anion gap 7 7 - 16 mmol/L    Glucose 76 65 - 100 mg/dL    BUN 16 6 - 23 MG/DL    Creatinine 1.08 0.8 - 1.5 MG/DL    GFR est AA >60 >60 ml/min/1.73m2    GFR est non-AA >60 >60 ml/min/1.73m2    Calcium 8.3 8.3 - 10.4 MG/DL     CT CHEST W CONT   Final Result   IMPRESSION:   1. No evidence of central or segmental pulmonary embolism. 2. Moderate right pleural effusion and adjacent moderately extensive   atelectasis/infiltrate within the right lower lobe. 3. Mild areas of atelectasis/infiltrate within the left lower lobe and right   middle lobe. 4. Pancreatic cyst or other low-density mass. This could represent the sequela   of previous pancreatitis. Documentation of stability would be recommended with a   follow-up CT scan using a dedicated pancreatic mass protocol in 6 months.       XR CHEST PA LAT   Final Result   IMPRESSION: As above      MRI ABD WO CONT    (Results Pending)       [address] Problems    Diagnosis Date Noted    Pancreatitis 04/27/2017    Chest pain 04/27/2017    Pleural effusion 04/27/2017    Elevated troponin 04/27/2017    Atelectasis 04/27/2017    BRITTNEY (obstructive sleep apnea) 03/07/2017     Plan:  · Pancreatitis/pancreatic cyst- per GI. Await MRCP and further GI recs. · Chest pain- likely referred pain from pancreatitis. Improved with fentanyl. · Dispo- home when cleared by GI. DVT Prophylaxis: SCDs    Signed By: Paris Stevenson.  Agnieszka Dunlap MD     April 29, 2017

## 2017-04-29 NOTE — PROGRESS NOTES
Pt called from room saying it was time for his morphine. On assessment, when inquiring as to his pain level, patient stated his pain was 0/10. Fentanyl patch was in place on left shoulder. I explained that the morphine is for if he has any pain. No IV morphine given at this time.

## 2017-04-29 NOTE — PROGRESS NOTES
IV Morphine effective.      04/28/17 2629   Pain 1   Pain Scale 1 Numeric (0 - 10)   Pain Intensity 1 0   Pain Reassessment 1 Yes   Patient Observation   Hourly Rounds Yes

## 2017-04-29 NOTE — PROGRESS NOTES
Ultrasound of bilateral posterior chest per Dr Brijesh Mckeon. Minimal fluid on right to warrant thoracentesis. Pictures scanned to chart.

## 2017-04-29 NOTE — PROGRESS NOTES
Scheduled Ibuprofen 600 mg po given. Will monitor. 04/29/17 0046   Pain 1   Pain Scale 1 Numeric (0 - 10)   Pain Intensity 1 5   Pain Onset 1 acute   Pain Location 1 Chest   Pain Orientation 1 Left   Pain Description 1 Aching; Sharp   Pain Intervention(s) 1 Medication (see MAR)

## 2017-04-29 NOTE — PROGRESS NOTES
Motrin effective.      04/29/17 0130   Pain 1   Pain Scale 1 Numeric (0 - 10)   Pain Intensity 1 0   Pain Reassessment 1 Patient sleeping

## 2017-04-29 NOTE — PROGRESS NOTES
Consult daily:  Patient was calm and resting in bed. Gave  a thumbs up when I entered the room. Early family in chair by bed. Encouraged with presence and words of hope. Will continue to assess needs daily.   Signed by chaplain Jose Roberto

## 2017-04-29 NOTE — PROGRESS NOTES
Respirations even and unlabored. No c/o pain or SOB. Encouraged to call for needs. Assessment completed.

## 2017-04-29 NOTE — PROGRESS NOTES
Repeated ultrasound of R pleural space and effusion remains very small. Shared images in \"bronchoscopy photos\" and discussed my opinion that the risks outweigh the benefits of thoracentesis in this circumstance, when likely etiology is known (pancreatitis) and there is likely to be autodiuresis in future which will resolve the problem.   Abel Aragon MD

## 2017-04-29 NOTE — PROGRESS NOTES
Pt hollering out in severe pain, states that he cannot breathe. Gesturing and thrashing about in the bed, moaning and groaning and hollering out loud.

## 2017-04-29 NOTE — PROGRESS NOTES
Received bedside shift report from Damion Alberto RN. Pt lying in bed. No apparent distress. Respirations even and unlabored. Instructed to call for assistance with needs, as they arise. Pt voiced understanding.

## 2017-04-29 NOTE — PROGRESS NOTES
.  [facility] Consult Note             Date: 4/29/2017    GI Problem: Acute pancreatitis    History of Present Illness:  Patient is a 50 y.o. male with PMH MVR and pancreatic cyst who is seen in consultation for acute pancreatitis. TG's are 137. Doing well with ice chips. MRCP done but not read yet. Hospital Medications:  Current Facility-Administered Medications   Medication Dose Route Frequency    morphine injection 2 mg  2 mg IntraVENous Q2H PRN    albuterol (PROVENTIL VENTOLIN) nebulizer solution 2.5 mg  2.5 mg Nebulization Q6H RT    busPIRone (BUSPAR) tablet 5 mg  5 mg Oral BID    pantoprazole (PROTONIX) tablet 40 mg  40 mg Oral ACB&D    fentaNYL (DURAGESIC) 12 mcg/hr patch 1 Patch  1 Patch TransDERmal Q72H    ibuprofen (MOTRIN) tablet 800 mg  800 mg Oral Q8H    aspirin delayed-release tablet 81 mg  81 mg Oral 7am    methyl salicylate-menthol (BENGAY) 15-10 % cream   Topical Q6H PRN    metoprolol tartrate (LOPRESSOR) tablet 25 mg  25 mg Oral Q12H    sodium chloride (NS) flush 5-10 mL  5-10 mL IntraVENous Q8H    sodium chloride (NS) flush 5-10 mL  5-10 mL IntraVENous PRN    naloxone (NARCAN) injection 0.4 mg  0.4 mg IntraVENous PRN    ondansetron (ZOFRAN) injection 4 mg  4 mg IntraVENous Q4H PRN    lactated ringers infusion  150 mL/hr IntraVENous CONTINUOUS    traMADol (ULTRAM) tablet 50 mg  50 mg Oral Q6H PRN    lip protectant (BLISTEX) ointment   Topical PRN    temazepam (RESTORIL) capsule 15 mg  15 mg Oral QHS PRN       Objective:     Physical Exam:  Vitals:  Visit Vitals    /59 (BP 1 Location: Left arm, BP Patient Position: Head of bed elevated (Comment degrees))    Pulse 82    Temp 98.8 °F (37.1 °C)    Resp 20    Ht 5' 10\" (1.778 m)    Wt 116.7 kg (257 lb 3.2 oz)    SpO2 95%    BMI 36.9 kg/m2       General: No acute distress. Skin:  Extremities and face reveal no rashes. No jurado erythema. No telangiectasias on the chest wall.   HEENT: Sclerae anicteric. No oral ulcers. No abnormal pigmentation of the lips. The neck is supple. Cardiovascular: Regular rate and rhythm. No murmurs, gallops, or rubs. Respiratory:  Comfortable breathing  With no accessory muscle use. Clear breath sounds with no wheezes, rales, or rhonchi. GI:  Abdomen nondistended, soft, and nontender. Normal active bowel sounds. No enlargement of the liver or spleen. No masses palpable. Musculoskeletal:  No pitting edema of the lower legs. Extremities have good range of motion. Neurological:  Gross memory appears intact. Patient is alert and oriented. Psychiatric:  Mood appears appropriate with judgement intact. Lymphatic:  No cervical or supraclavicular adenopathy. Laboratory:    Recent Labs      04/29/17   0701   WBC  7.7   RBC  3.23*   HGB  9.8*   HCT  29.8*   PLT  266      Recent Labs      04/29/17   0701   GLU  76   NA  142   K  4.3   CL  105   CO2  30   BUN  16   CREA  1.08   CA  8.3     No results for input(s): PTP, INR, APTT in the last 72 hours. No lab exists for component: INREXT  Recent Labs      04/29/17   0701   04/27/17   1413   SGOT   --    --   63*   AP   --    --   56   ALB   --    --   3.1*   TP   --    --   6.7   LPSE  911*   < >  935*    < > = values in this interval not displayed.        Assessment:       A 50 y.o. male with pancreatic cyst and acute pancreatitis      Plan:       Await MRCP read  Advance diet to clear liquids      Signed By: Renita Masterson MD     April 29, 2017

## 2017-04-29 NOTE — PROGRESS NOTES
[facility] CARDIOLOGY PROGRESS NOTE    4/29/2017 11:23 AM    Admit Date: 4/27/2017    Admit Diagnosis: Pleural effusion [J90]      Subjective:   Stable since last night without interval angina, CHF, palpitations, edema, presyncope or syncope. Vitals controlled and tolerating meds well. Still with pancreatitis pain but steadily improving - echo normal.   Treating demand ischemia/elevated troponin medically. Objective:      Vitals:    04/28/17 2239 04/29/17 0448 04/29/17 0733 04/29/17 1019   BP: 130/63 116/63 108/69 121/59   Pulse: 78 73 77 82   Resp: 24 22 20 20   Temp: 99.2 °F (37.3 °C) 98.8 °F (37.1 °C) 97.9 °F (36.6 °C) 98.8 °F (37.1 °C)   SpO2: 95% 92% 90% 95%   Weight:       Height:           Physical Exam:  Neck- supple, no JVD at 45 deg  CV- regular rate and rhythm no MRG  Lung- clear bilaterally, decreased laterally  Abd- soft, mid epigastrium mildly tender, nondistended  Ext- no edema  Skin- warm and dry    Data Review:   Recent Labs      04/29/17   0701  04/28/17   0622  04/27/17   1413   NA  142   --   142   K  4.3   --   4.0   MG   --    --   2.1   BUN  16   --   11   CREA  1.08   --   1.21   GLU  76   --   83   WBC  7.7   --   9.4   HGB  9.8*   --   11.7*   HCT  29.8*   --   32.4*   PLT  266   --   276   CHOL   --   154   --    HDL   --   27*   --        Assessment and Plan:     Principal Problem:    Pancreatitis (4/27/2017)- still symptomatic but improving slowly, continue supportive care    Active Problems:    BRITTNEY (obstructive sleep apnea) (3/7/2017)- CPAP as using at home      Chest pain (4/27/2017)- resolved, non-cardiac- see below      Pleural effusion (4/27/2017)- s/p tap- CXR surveillance per pulmonary/hospitalists      Elevated troponin (4/27/2017)- demand ischemia in this setting- EF and wall motion normal on echo, recent cath with normal coronaries, manage conservatively. Atelectasis (4/27/2017)- IS as needed    We will follow from a distance.  Please call if any questions arise or if further assistance is needed. Thanks for the consult.      Ramesh Avila MD  [facility] Cardiology  Pager [phone]

## 2017-04-30 VITALS
HEIGHT: 70 IN | DIASTOLIC BLOOD PRESSURE: 60 MMHG | SYSTOLIC BLOOD PRESSURE: 113 MMHG | HEART RATE: 74 BPM | TEMPERATURE: 97.9 F | WEIGHT: 257.2 LBS | BODY MASS INDEX: 36.82 KG/M2 | OXYGEN SATURATION: 93 % | RESPIRATION RATE: 18 BRPM

## 2017-04-30 LAB — LIPASE SERPL-CCNC: 949 U/L (ref 73–393)

## 2017-04-30 PROCEDURE — 74011250637 HC RX REV CODE- 250/637: Performed by: INTERNAL MEDICINE

## 2017-04-30 PROCEDURE — 94760 N-INVAS EAR/PLS OXIMETRY 1: CPT

## 2017-04-30 PROCEDURE — 74011250637 HC RX REV CODE- 250/637: Performed by: PHYSICIAN ASSISTANT

## 2017-04-30 PROCEDURE — 36415 COLL VENOUS BLD VENIPUNCTURE: CPT | Performed by: INTERNAL MEDICINE

## 2017-04-30 PROCEDURE — 83690 ASSAY OF LIPASE: CPT | Performed by: INTERNAL MEDICINE

## 2017-04-30 PROCEDURE — 94640 AIRWAY INHALATION TREATMENT: CPT

## 2017-04-30 PROCEDURE — 74011000250 HC RX REV CODE- 250: Performed by: PHYSICIAN ASSISTANT

## 2017-04-30 RX ORDER — IBUPROFEN 800 MG/1
800 TABLET ORAL
Qty: 56 TAB | Refills: 0 | Status: SHIPPED
Start: 2017-04-30 | End: 2017-05-14

## 2017-04-30 RX ORDER — OXYCODONE AND ACETAMINOPHEN 7.5; 325 MG/1; MG/1
1 TABLET ORAL
Qty: 20 TAB | Refills: 0 | Status: SHIPPED | OUTPATIENT
Start: 2017-04-30 | End: 2017-06-05

## 2017-04-30 RX ADMIN — BUSPIRONE HYDROCHLORIDE 5 MG: 5 TABLET ORAL at 08:54

## 2017-04-30 RX ADMIN — IBUPROFEN 800 MG: 200 TABLET, FILM COATED ORAL at 08:54

## 2017-04-30 RX ADMIN — PANTOPRAZOLE SODIUM 40 MG: 40 TABLET, DELAYED RELEASE ORAL at 05:25

## 2017-04-30 RX ADMIN — ALBUTEROL SULFATE 2.5 MG: 2.5 SOLUTION RESPIRATORY (INHALATION) at 08:50

## 2017-04-30 RX ADMIN — ASPIRIN 81 MG: 81 TABLET, COATED ORAL at 05:25

## 2017-04-30 RX ADMIN — Medication 10 ML: at 05:25

## 2017-04-30 RX ADMIN — METOPROLOL TARTRATE 25 MG: 25 TABLET ORAL at 08:55

## 2017-04-30 RX ADMIN — IBUPROFEN 800 MG: 200 TABLET, FILM COATED ORAL at 00:17

## 2017-04-30 NOTE — PROGRESS NOTES
GI DAILY PROGRESS NOTE    Admit Date:  4/27/2017    Today's Date:  4/30/2017    CC:  Acute pancreatitis    Subjective:     Patient reports that he began to have increased abdominal pain last night. He reports that he describes as \"more sore\" than yesterday. He states that pain is diffuse. He reports 2 BMs this am, loose, but not watery. He denies any rectal bleeding or melena. He reports waves of nausea, but no vomiting. Still reports SOB-pulmonology is following. Lipase 949 (lipase 911 on 4/29/17). MRCP has been completed, but no report available at this time. Medications:   Current Facility-Administered Medications   Medication Dose Route Frequency    morphine injection 2 mg  2 mg IntraVENous Q2H PRN    albuterol (PROVENTIL VENTOLIN) nebulizer solution 2.5 mg  2.5 mg Nebulization Q6H RT    busPIRone (BUSPAR) tablet 5 mg  5 mg Oral BID    pantoprazole (PROTONIX) tablet 40 mg  40 mg Oral ACB&D    fentaNYL (DURAGESIC) 12 mcg/hr patch 1 Patch  1 Patch TransDERmal Q72H    ibuprofen (MOTRIN) tablet 800 mg  800 mg Oral Q8H    aspirin delayed-release tablet 81 mg  81 mg Oral 7am    methyl salicylate-menthol (BENGAY) 15-10 % cream   Topical Q6H PRN    metoprolol tartrate (LOPRESSOR) tablet 25 mg  25 mg Oral Q12H    sodium chloride (NS) flush 5-10 mL  5-10 mL IntraVENous Q8H    sodium chloride (NS) flush 5-10 mL  5-10 mL IntraVENous PRN    naloxone (NARCAN) injection 0.4 mg  0.4 mg IntraVENous PRN    ondansetron (ZOFRAN) injection 4 mg  4 mg IntraVENous Q4H PRN    lactated ringers infusion  150 mL/hr IntraVENous CONTINUOUS    traMADol (ULTRAM) tablet 50 mg  50 mg Oral Q6H PRN    lip protectant (BLISTEX) ointment   Topical PRN    temazepam (RESTORIL) capsule 15 mg  15 mg Oral QHS PRN       Review of Systems:  ROS was obtained, with pertinent positives as listed above. No chest pain or SOB.     Diet:  Clear liquids    Objective:   Vitals:  Visit Vitals    /67    Pulse 79    Temp 98.1 °F (36.7 °C)  Resp 18    Ht 5' 10\" (1.778 m)    Wt 116.7 kg (257 lb 3.2 oz)    SpO2 98%    BMI 36.9 kg/m2     Intake/Output:     04/28 1901 - 04/30 0700  In: 1700 [P.O.:1700]  Out: -   Exam:  General appearance: alert, cooperative, no distress  Lungs: clear to auscultation bilaterally anteriorly  Heart: regular rate and rhythm. No MGCR  Abdomen: soft, mildly TTP diffuse. Bowel sounds normal. No masses, no organomegaly  Extremities: extremities normal, atraumatic, no cyanosis or edema  Neuro:  alert and oriented    Data Review (Labs):    Recent Labs      04/30/17   0709  04/29/17   0701  04/28/17   0622  04/27/17   1413   WBC   --   7.7   --   9.4   HGB   --   9.8*   --   11.7*   HCT   --   29.8*   --   32.4*   PLT   --   266   --   276   MCV   --   92.3   --   87.3   NA   --   142   --   142   K   --   4.3   --   4.0   CL   --   105   --   105   CO2   --   30   --   28   BUN   --   16   --   11   CREA   --   1.08   --   1.21   CA   --   8.3   --   8.5   MG   --    --    --   2.1   GLU   --   76   --   83   AP   --    --    --   56   SGOT   --    --    --   63*   ALT   --    --    --   79*   TBILI   --    --    --   0.9   ALB   --    --    --   3.1*   TP   --    --    --   6.7   LPSE  949*  911*  917*  935*       Assessment:     Principal Problem:    Pancreatitis (4/27/2017)    Active Problems:    BRITTNEY (obstructive sleep apnea) (3/7/2017)      Chest pain (4/27/2017)      Pleural effusion (4/27/2017)      Elevated troponin (4/27/2017)      Atelectasis (4/27/2017)    50 y.o. male with PMH of GERD, nephrolithiasis, HTN, MR, BRITTNEY, hx stable pancreatic cyst, and recent mitral valve repair, who was admitted for CP and upper abd pain with mildly elevated troponinisseen in consultation at the request of Dr. Evangelista Durán for acute pancreatitis. Lipase elevated but no mention of pancreatitis on CTA chest. Mild transaminitis- ?biliary pancreatitis. Pt is a non-drinker.      Plan:     -Follow up on MRCP results  -Decrease to sips of water and ice chips given increased abdominal pain today  -Continue to follow    DELMER Estrada  [facility]      Patient is seen and examined in collaboration with Dr. Ca Harrison. Assessment and plan as per Dr. Ca Harrison.

## 2017-04-30 NOTE — PROGRESS NOTES
At [phone] discharge instructions and prescription x1 given. Opportunity given for questions, verbalized understanding. Escorted per Lavern Zambrano CNA at 1600 in [facility] to discharge area to waiting vehicle.

## 2017-04-30 NOTE — DISCHARGE INSTRUCTIONS
DISCHARGE SUMMARY from Nurse    The following personal items are in your possession at time of discharge:    Dental Appliances: None  Visual Aid: Glasses     Home Medications: Kept at bedside  Jewelry: None  Clothing: At bedside  Other Valuables: None             PATIENT INSTRUCTIONS:    After general anesthesia or intravenous sedation, for 24 hours or while taking prescription Narcotics:  · Limit your activities  · Do not drive and operate hazardous machinery  · Do not make important personal or business decisions  · Do  not drink alcoholic beverages  · If you have not urinated within 8 hours after discharge, please contact your surgeon on call. Report the following to your surgeon:  · Excessive pain, swelling, redness or odor of or around the surgical area  · Temperature over 100.5  · Nausea and vomiting lasting longer than 4 hours or if unable to take medications  · Any signs of decreased circulation or nerve impairment to extremity: change in color, persistent  numbness, tingling, coldness or increase pain  · Any questions        What to do at Home:  Recommended activity: Activity as tolerated, clear liquids for today, then advance to bland, non-fatty soft foods as tolerated. If you experience any of the following symptoms persistent nausea, vomiting, unrelieved abdominal pain, please follow up with Dr. Greg Jackson at [phone]      *  Please give a list of your current medications to your Primary Care Provider. *  Please update this list whenever your medications are discontinued, doses are      changed, or new medications (including over-the-counter products) are added. *  Please carry medication information at all times in case of emergency situations. These are general instructions for a healthy lifestyle:    No smoking/ No tobacco products/ Avoid exposure to second hand smoke    Surgeon General's Warning:  Quitting smoking now greatly reduces serious risk to your health.     Obesity, smoking, and sedentary lifestyle greatly increases your risk for illness    A healthy diet, regular physical exercise & weight monitoring are important for maintaining a healthy lifestyle    You may be retaining fluid if you have a history of heart failure or if you experience any of the following symptoms:  Weight gain of 3 pounds or more overnight or 5 pounds in a week, increased swelling in our hands or feet or shortness of breath while lying flat in bed. Please call your doctor as soon as you notice any of these symptoms; do not wait until your next office visit. Recognize signs and symptoms of STROKE:    F-face looks uneven    A-arms unable to move or move unevenly    S-speech slurred or non-existent    T-time-call 911 as soon as signs and symptoms begin-DO NOT go       Back to bed or wait to see if you get better-TIME IS BRAIN. Warning Signs of HEART ATTACK     Call 911 if you have these symptoms:   Chest discomfort. Most heart attacks involve discomfort in the center of the chest that lasts more than a few minutes, or that goes away and comes back. It can feel like uncomfortable pressure, squeezing, fullness, or pain.  Discomfort in other areas of the upper body. Symptoms can include pain or discomfort in one or both arms, the back, neck, jaw, or stomach.  Shortness of breath with or without chest discomfort.  Other signs may include breaking out in a cold sweat, nausea, or lightheadedness. Don't wait more than five minutes to call 911 - MINUTES MATTER! Fast action can save your life. Calling 911 is almost always the fastest way to get lifesaving treatment. Emergency Medical Services staff can begin treatment when they arrive -- up to an hour sooner than if someone gets to the hospital by car. The discharge information has been reviewed with the patient and spouse. The patient and spouse verbalized understanding.     Discharge medications reviewed with the patient and spouse and appropriate educational materials and side effects teaching were provided.

## 2017-04-30 NOTE — PROGRESS NOTES
Bedside report given to [address] [place], [place] [address]y Street, RN. Opportunity for any questions or concerns.

## 2017-04-30 NOTE — PROGRESS NOTES
Received shift report from Aman mackenzie RN. Alert and oriented. Resting in bed, call light within reach. Fentanyl patch noted to left shoulder. Respirations even and unlabored on room air. Incision to right side, open to air, redness noted. Clear liquid diet. Tele sinus rhythm 84. LR at 150ml/hr. SCDs in room.

## 2017-04-30 NOTE — PROGRESS NOTES
Hospitalist Progress Note    2017  Admit Date: 2017  3:15 PM   NAME: Toribio Cardoza   :  1965   MRN:  [id number]   Attending: Alisha Quick DO  PCP:  Feng Cabrera NP    SUBJECTIVE:   Mr. Vish Ba is a 49 yo M admitted 17 for pancreatitis, pancreatic cyst, and chest pain. He reports today that his chest pain is much improved and he slept most of the night last night. Fentanyl patch added yesterday and he thinks this is working well. Cardiology has deemed chest pain noncardiac and have signed off. MRCP done and results pending. GI following and have advanced him to a clear diet. He reports occasional shortness of breath at times. 17- chest pain has resolved. Still having abdominal pain. Tolerating clears. MRI abdomen final result pending, but prelim reading shows no gallstones but pancreatic cyst still present. Review of Systems negative with exception of pertinent positives noted above  PHYSICAL EXAM     Visit Vitals    /60    Pulse 74    Temp 97.9 °F (36.6 °C)    Resp 18    Ht 5' 10\" (1.778 m)    Wt 116.7 kg (257 lb 3.2 oz)    SpO2 93%    BMI 36.9 kg/m2      Temp (24hrs), Av.9 °F (36.6 °C), Min:97.3 °F (36.3 °C), Max:98.6 °F (37 °C)    Oxygen Therapy  O2 Sat (%): 93 % (17 1153)  Pulse via Oximetry: 84 beats per minute (17)  O2 Device: Room air (17 0851)  O2 Flow Rate (L/min): 2 l/min (17 1019)    Intake/Output Summary (Last 24 hours) at 17 1249  Last data filed at 17 2326   Gross per 24 hour   Intake              740 ml   Output                0 ml   Net              740 ml      General: No acute distress    Lungs:  CTA Bilaterally.    Heart:  Regular rate and rhythm,  No murmur, rub, or gallop  Abdomen: Soft, Non distended, mild LUQ tenderness to palpation, Positive bowel sounds  Extremities: No cyanosis, clubbing or edema  Neurologic:  No focal deficits    Recent Results (from the past 24 hour(s))   LIPASE Collection Time: 04/30/17  7:09 AM   Result Value Ref Range    Lipase 949 (H) 73 - 393 U/L     CT CHEST W CONT   Final Result   IMPRESSION:   1. No evidence of central or segmental pulmonary embolism. 2. Moderate right pleural effusion and adjacent moderately extensive   atelectasis/infiltrate within the right lower lobe. 3. Mild areas of atelectasis/infiltrate within the left lower lobe and right   middle lobe. 4. Pancreatic cyst or other low-density mass. This could represent the sequela   of previous pancreatitis. Documentation of stability would be recommended with a   follow-up CT scan using a dedicated pancreatic mass protocol in 6 months. XR CHEST PA LAT   Final Result   IMPRESSION: As above      MRI ABD WO CONT    (Results Pending)       ASSESSMENT      Active Hospital Problems    Diagnosis Date Noted    Pancreatitis 04/27/2017    Chest pain 04/27/2017    Pleural effusion 04/27/2017    Elevated troponin 04/27/2017    Atelectasis 04/27/2017    BRITTNEY (obstructive sleep apnea) 03/07/2017     Plan:  · Pancreatitis/pancreatic cyst- per GI. Per preliminary read, abdominal MRI shows no biliary stones or ductal dilation. Discussed with Dr. Marin Gallagher, who recommended keeping patient due to continued abdominal pain and to make sure cyst does not need to be drained. · Chest pain- likely referred pain from pancreatitis. Improved with fentanyl. · Dispo- home when cleared by GI. DVT Prophylaxis: SCDs    Signed By: Abril Tam.  Viviana Iniguez MD     April 30, 2017

## 2017-04-30 NOTE — PROGRESS NOTES
Reassessment complete. Patient remains stable. Resting in bed quietly, call light within reach. Respirations even and unlabored on room air. No acute distress noted.

## 2017-04-30 NOTE — DISCHARGE SUMMARY
Hospitalist Discharge Summary     Patient ID:  Lyn Lunsford  [id number]  50 y.o.  1965  Admit date: 4/27/2017  3:15 PM  Discharge date and time: 4/30/2017  Attending: Ria Jordan DO  PCP:  Omar Max NP  Treatment Team: Attending Provider: Ria Jordan DO; Consulting Provider: Noemí Gonzales MD; Physician: Terese Cote MD; Utilization Review: Vika Willoughby RN    Principal Diagnosis Pancreatitis   Principal Problem:    Pancreatitis (4/27/2017)    Active Problems:    BRITTNEY (obstructive sleep apnea) (3/7/2017)      Chest pain (4/27/2017)      Pleural effusion (4/27/2017)      Elevated troponin (4/27/2017)      Atelectasis (4/27/2017)           HPI:  'Lyn Lunsford is a 50year old CM with a PMH of GERD, HTN, BRITTNEY (does not wear CPAP at home), and recent mitral valve repair due to prolapse on 4/21/17 presented back to the ER on 4/27/17 with sharp left sided chest pain, worse with inspiration, that radiates into his neck and is worse with reclining. He also complains of nausea and abdominal tenderness for 1-2 days. He stated that he had the mitral valve repair and has been doing well since then, until 2 days ago when he first started noticing the chest pain. When it didn't go away, he came to the ER to be evaluated.   In the ER he was found to have a lipase of 953, pleural effusions on CT scan, and mildly elevated troponin.'    Hospital Course:  He was admitted and started on IVF. Seen by GI, cardiology, and pulmonology. Was started on fentanyl patch with resolution of chest pain and improvement of abdominal pain. He had MRCP done that preliminary read showed no biliary stone or duct dilation and pancreatic cyst present. He was tolerating clear liquid on day of discharge. He was deemed stable for discharge and he was anxious to return home. Case discussed with Dr. Gardenia Carrion, and he was okay for him to be discharged home with GI follow up in 1 week.     Significant Diagnostic Studies:       Labs: Results:       Chemistry Recent Labs      04/29/17   0701  04/27/17   1413   GLU  76  83   NA  142  142   K  4.3  4.0   CL  105  105   CO2  30  28   BUN  16  11   CREA  1.08  1.21   CA  8.3  8.5   AGAP  7  9   AP   --   56   TP   --   6.7   ALB   --   3.1*   GLOB   --   3.6*   AGRAT   --   0.9*      CBC w/Diff Recent Labs      04/29/17   0701  04/27/17   1413   WBC  7.7  9.4   RBC  3.23*  3.71*   HGB  9.8*  11.7*   HCT  29.8*  32.4*   PLT  266  276   GRANS  64  72   LYMPH  21  14   EOS  4  4      Cardiac Enzymes No results for input(s): CPK, CKND1, ELISEO in the last 72 hours. No lab exists for component: CKRMB, TROIP   Coagulation No results for input(s): PTP, INR, APTT in the last 72 hours. No lab exists for component: INREXT    Lipid Panel Lab Results   Component Value Date/Time    Cholesterol, total 154 04/28/2017 06:22 AM    HDL Cholesterol 27 04/28/2017 06:22 AM    LDL, calculated 99.6 04/28/2017 06:22 AM    VLDL, calculated 27.4 04/28/2017 06:22 AM    Triglyceride 137 04/28/2017 06:22 AM    CHOL/HDL Ratio 5.7 04/28/2017 06:22 AM      BNP No results for input(s): BNPP in the last 72 hours. Liver Enzymes Recent Labs      04/27/17   1413   TP  6.7   ALB  3.1*   AP  56   SGOT  63*      Thyroid Studies Lab Results   Component Value Date/Time    TSH 1.386 03/07/2017 04:20 AM            Discharge Exam:  Visit Vitals    /60    Pulse 74    Temp 97.9 °F (36.6 °C)    Resp 18    Ht 5' 10\" (1.778 m)    Wt 116.7 kg (257 lb 3.2 oz)    SpO2 93%    BMI 36.9 kg/m2     General appearance: alert, cooperative, no distress, appears stated age  Lungs: clear to auscultation bilaterally  Heart: regular rate and rhythm, S1, S2 normal, no murmur, click, rub or gallop  Abdomen: soft, mild LUQ tenderness.  Bowel sounds normal. No masses,  no organomegaly  Extremities: no cyanosis or edema  Neurologic: Grossly normal    Disposition: home  Discharge Condition: stable  Patient Instructions: Medication List      START taking these medications          oxyCODONE-acetaminophen 7.5-325 mg per tablet   Dose:  1 Tab   Instructions: Take 1 Tab by mouth every six (6) hours as needed for Pain. Max Daily Amount: 4 Tabs. Indications: Pain   Commonly known as:  PERCOCET 7.5         CHANGE how you take these medications          ibuprofen 800 mg tablet   Dose:  800 mg   Instructions: Take 1 Tab by mouth every six (6) hours as needed for Pain for up to 14 days. What Changed:    - when to take this  - reasons to take this   Commonly known as:  MOTRIN         CONTINUE taking these medications          aspirin delayed-release 81 mg tablet   Dose:  81 mg       methyl salicylate-menthol 72-63 % topical cream   Instructions:  Over the counter BenGay use prn   Commonly known as:  BENGAY       metoprolol tartrate 25 mg tablet   Dose:  25 mg   Instructions: Take 1 Tab by mouth every twelve (12) hours. Commonly known as:  LOPRESSOR       omeprazole 40 mg capsule   Dose:  40 mg   Commonly known as:  PRILOSEC         STOP taking these medications          furosemide 40 mg tablet   Commonly known as:  LASIX       potassium chloride 20 mEq tablet   Commonly known as:  K-DUR, KLOR-CON       traMADol-acetaminophen 37.5-325 mg per tablet   Commonly known as:  ULTRACET            Where to Get Your Medications      Information about where to get these medications is not yet available     ! Ask your nurse or doctor about these medications     ibuprofen 800 mg tablet    oxyCODONE-acetaminophen 7.5-325 mg per tablet             Activity: Activity as tolerated  Diet: Clear liquids, advance as tolerated    Follow-up  ·   Dr. Jericho Robles or [facility] in 1 week to follow up on MRCP results. Time spent to discharge patient 31 minutes  Signed:  Bernadette Jackson.  Melia Hummel MD  4/30/2017  2:08 PM

## 2017-05-02 ENCOUNTER — HOME HEALTH ADMISSION (OUTPATIENT)
Dept: HOME HEALTH SERVICES | Facility: HOME HEALTH | Age: 52
End: 2017-05-02
Payer: COMMERCIAL

## 2017-05-02 NOTE — PROGRESS NOTES
[address]  Face to Face Encounter    Patients Name: Leila Colvin    YOB: 1965    Ordering Physician: Ro Romero MD    Primary Diagnosis: Pleural effusion [J90]  Pleural effusion [J90]    Date of Face to Face:   5/2/2017                                  Face to Face Encounter findings are related to primary reason for home care:   yes. 1. I certify that the patient needs intermittent care as follows: skilled nursing care:  skilled observation/assessment, patient education, complex care plan management and rehabilitative nursing  physical therapy: strengthening and pt/caregiver education    2. I certify that this patient is homebound, that is: 1) patient requires the use of a cane device, special transportation, or assistance of another to leave the home; or 2) patient's condition makes leaving the home medically contraindicated; and 3) patient has a normal inability to leave the home and leaving the home requires considerable and taxing effort. Patient may leave the home for infrequent and short duration for medical reasons, and occasional absences for non-medical reasons. Homebound status is due to the following functional limitations: Patient with strength deficits limiting the performance of all ADL's without caregiver assistance or the use of an assistive device. Patient with poor safety awareness and is at risk for falls without assistance of another person and the use of an assistive device. Patient with poor ambulation endurance limiting their safe ability to ascend/descend the required number of steps to leave the home. Patient with increased shortness of breath with all exertional activity limiting ambulation outside the home. Patient with strength deficits limiting the ability to carry portable O2 for distances outside the home without the assistance of a caregiver.     3. I certify that this patient is under my care and that I, or a nurse practitioner or [phone] assistant, or clinical nurse specialist, or certified nurse midwife, working with me, had a Face-to-Face Encounter that meets the physician Face-to-Face Encounter requirements. The following are the clinical findings from the [address] [place] [address] encounter that support the need for skilled services and is a summary of the encounter: Medical Record    See discharge summary and summary of the patient's illness      Adele Melendez RN  5/2/2017      THE FOLLOWING TO BE COMPLETED BY THE COMMUNITY PHYSICIAN:    I concur with the findings described above from the F2F encounter that this patient is homebound and in need of a skilled service.     Certifying Physician: _____________________________________      Printed Certifying Physician Name: _____________________________________    Date: _________________

## 2017-05-03 ENCOUNTER — HOME CARE VISIT (OUTPATIENT)
Dept: SCHEDULING | Facility: HOME HEALTH | Age: 52
End: 2017-05-03
Payer: COMMERCIAL

## 2017-05-03 VITALS
SYSTOLIC BLOOD PRESSURE: 129 MMHG | OXYGEN SATURATION: 94 % | HEART RATE: 90 BPM | DIASTOLIC BLOOD PRESSURE: 78 MMHG | RESPIRATION RATE: 20 BRPM

## 2017-05-03 PROCEDURE — 400013 HH SOC

## 2017-05-03 PROCEDURE — G0299 HHS/HOSPICE OF RN EA 15 MIN: HCPCS

## 2017-05-04 ENCOUNTER — HOME CARE VISIT (OUTPATIENT)
Dept: SCHEDULING | Facility: HOME HEALTH | Age: 52
End: 2017-05-04
Payer: COMMERCIAL

## 2017-05-04 VITALS
DIASTOLIC BLOOD PRESSURE: 69 MMHG | HEART RATE: 94 BPM | OXYGEN SATURATION: 95 % | TEMPERATURE: 97.5 F | SYSTOLIC BLOOD PRESSURE: 120 MMHG

## 2017-05-04 PROCEDURE — G0152 HHCP-SERV OF OT,EA 15 MIN: HCPCS

## 2017-05-05 ENCOUNTER — HOME CARE VISIT (OUTPATIENT)
Dept: SCHEDULING | Facility: HOME HEALTH | Age: 52
End: 2017-05-05
Payer: COMMERCIAL

## 2017-05-05 VITALS
TEMPERATURE: 96.3 F | SYSTOLIC BLOOD PRESSURE: 132 MMHG | OXYGEN SATURATION: 94 % | HEART RATE: 91 BPM | RESPIRATION RATE: 18 BRPM | DIASTOLIC BLOOD PRESSURE: 77 MMHG

## 2017-05-05 PROCEDURE — G0158 HHC OT ASSISTANT EA 15: HCPCS

## 2017-05-08 ENCOUNTER — HOME CARE VISIT (OUTPATIENT)
Dept: SCHEDULING | Facility: HOME HEALTH | Age: 52
End: 2017-05-08
Payer: COMMERCIAL

## 2017-05-08 VITALS
OXYGEN SATURATION: 94 % | SYSTOLIC BLOOD PRESSURE: 128 MMHG | DIASTOLIC BLOOD PRESSURE: 83 MMHG | TEMPERATURE: 96.7 F | HEART RATE: 90 BPM | RESPIRATION RATE: 17 BRPM

## 2017-05-08 PROCEDURE — G0158 HHC OT ASSISTANT EA 15: HCPCS

## 2017-05-09 ENCOUNTER — HOME CARE VISIT (OUTPATIENT)
Dept: SCHEDULING | Facility: HOME HEALTH | Age: 52
End: 2017-05-09
Payer: COMMERCIAL

## 2017-05-09 VITALS
HEART RATE: 100 BPM | DIASTOLIC BLOOD PRESSURE: 88 MMHG | TEMPERATURE: 98 F | SYSTOLIC BLOOD PRESSURE: 120 MMHG | OXYGEN SATURATION: 96 % | RESPIRATION RATE: 16 BRPM

## 2017-05-09 PROCEDURE — G0299 HHS/HOSPICE OF RN EA 15 MIN: HCPCS

## 2017-05-10 ENCOUNTER — HOME CARE VISIT (OUTPATIENT)
Dept: SCHEDULING | Facility: HOME HEALTH | Age: 52
End: 2017-05-10
Payer: COMMERCIAL

## 2017-05-10 VITALS
HEART RATE: 102 BPM | SYSTOLIC BLOOD PRESSURE: 123 MMHG | RESPIRATION RATE: 18 BRPM | OXYGEN SATURATION: 98 % | DIASTOLIC BLOOD PRESSURE: 80 MMHG | TEMPERATURE: 96.7 F

## 2017-05-10 PROCEDURE — G0158 HHC OT ASSISTANT EA 15: HCPCS

## 2017-05-12 ENCOUNTER — HOME CARE VISIT (OUTPATIENT)
Dept: SCHEDULING | Facility: HOME HEALTH | Age: 52
End: 2017-05-12
Payer: COMMERCIAL

## 2017-05-12 PROCEDURE — G0299 HHS/HOSPICE OF RN EA 15 MIN: HCPCS

## 2017-05-14 VITALS
SYSTOLIC BLOOD PRESSURE: 130 MMHG | TEMPERATURE: 97.8 F | HEART RATE: 92 BPM | OXYGEN SATURATION: 95 % | DIASTOLIC BLOOD PRESSURE: 90 MMHG | RESPIRATION RATE: 18 BRPM

## 2017-05-17 ENCOUNTER — HOME CARE VISIT (OUTPATIENT)
Dept: SCHEDULING | Facility: HOME HEALTH | Age: 52
End: 2017-05-17
Payer: COMMERCIAL

## 2017-05-17 VITALS
HEART RATE: 92 BPM | OXYGEN SATURATION: 95 % | RESPIRATION RATE: 18 BRPM | TEMPERATURE: 96.5 F | DIASTOLIC BLOOD PRESSURE: 68 MMHG | SYSTOLIC BLOOD PRESSURE: 99 MMHG

## 2017-05-17 PROCEDURE — G0158 HHC OT ASSISTANT EA 15: HCPCS

## 2017-05-18 ENCOUNTER — HOME CARE VISIT (OUTPATIENT)
Dept: SCHEDULING | Facility: HOME HEALTH | Age: 52
End: 2017-05-18
Payer: COMMERCIAL

## 2017-05-18 ENCOUNTER — HOSPITAL ENCOUNTER (OUTPATIENT)
Dept: LAB | Age: 52
Discharge: HOME | End: 2017-05-18

## 2017-05-18 PROCEDURE — 88305 TISSUE EXAM BY PATHOLOGIST: CPT | Performed by: INTERNAL MEDICINE

## 2017-05-18 PROCEDURE — G0299 HHS/HOSPICE OF RN EA 15 MIN: HCPCS

## 2017-05-18 PROCEDURE — 88312 SPECIAL STAINS GROUP 1: CPT | Performed by: INTERNAL MEDICINE

## 2017-05-23 ENCOUNTER — HOME CARE VISIT (OUTPATIENT)
Dept: SCHEDULING | Facility: HOME HEALTH | Age: 52
End: 2017-05-23
Payer: COMMERCIAL

## 2017-05-23 VITALS — OXYGEN SATURATION: 98 % | HEART RATE: 105 BPM | SYSTOLIC BLOOD PRESSURE: 115 MMHG | DIASTOLIC BLOOD PRESSURE: 70 MMHG

## 2017-05-23 PROCEDURE — G0152 HHCP-SERV OF OT,EA 15 MIN: HCPCS

## 2017-05-26 ENCOUNTER — RX ONLY (OUTPATIENT)
Age: 52
Setting detail: RX ONLY
End: 2017-05-26

## 2017-05-26 RX ORDER — FLUCONAZOLE 150 MG/1
TABLET ORAL
Qty: 5 | Refills: 0 | Status: ERX

## 2017-05-30 ENCOUNTER — RX ONLY (OUTPATIENT)
Age: 52
Setting detail: RX ONLY
End: 2017-05-30

## 2017-05-30 RX ORDER — FLUCONAZOLE 150 MG/1
TABLET ORAL
Qty: 5 | Refills: 0 | Status: ERX

## 2017-06-20 ENCOUNTER — RX ONLY (OUTPATIENT)
Age: 52
Setting detail: RX ONLY
End: 2017-06-20

## 2017-06-20 RX ORDER — ACYCLOVIR 400 MG/1
TABLET ORAL
Qty: 60 | Refills: 2 | Status: ERX

## 2017-07-02 ENCOUNTER — HOSPITAL ENCOUNTER (OUTPATIENT)
Dept: SLEEP MEDICINE | Age: 52
Discharge: HOME | End: 2017-07-02
Attending: INTERNAL MEDICINE
Payer: COMMERCIAL

## 2017-07-02 PROCEDURE — 95810 POLYSOM 6/> YRS 4/> PARAM: CPT

## 2017-07-20 ENCOUNTER — HOSPITAL ENCOUNTER (OUTPATIENT)
Dept: SLEEP MEDICINE | Age: 52
Discharge: HOME | End: 2017-07-20
Attending: INTERNAL MEDICINE
Payer: COMMERCIAL

## 2017-07-20 PROCEDURE — 95811 POLYSOM 6/>YRS CPAP 4/> PARM: CPT

## 2017-09-13 ENCOUNTER — APPOINTMENT (RX ONLY)
Dept: URBAN - METROPOLITAN AREA CLINIC 24 | Facility: CLINIC | Age: 52
Setting detail: DERMATOLOGY
End: 2017-09-13

## 2017-09-13 DIAGNOSIS — Z41.9 ENCOUNTER FOR PROCEDURE FOR PURPOSES OTHER THAN REMEDYING HEALTH STATE, UNSPECIFIED: ICD-10-CM

## 2017-09-13 PROBLEM — L85.3 XEROSIS CUTIS: Status: ACTIVE | Noted: 2017-09-13

## 2017-09-13 PROCEDURE — ? GENTLELASE

## 2017-09-13 ASSESSMENT — LOCATION DETAILED DESCRIPTION DERM
LOCATION DETAILED: LEFT MEDIAL TRAPEZIAL NECK
LOCATION DETAILED: RIGHT LATERAL TRAPEZIAL NECK

## 2017-09-13 ASSESSMENT — LOCATION ZONE DERM: LOCATION ZONE: NECK

## 2017-09-13 ASSESSMENT — LOCATION SIMPLE DESCRIPTION DERM: LOCATION SIMPLE: POSTERIOR NECK

## 2017-09-13 NOTE — PROCEDURE: GENTLELASE
Endpoint: Lentigines: Immediate endpoint: perilesional erythema and edema. Vaseline and ice applied. Post care reviewed with patient.
Total Pulses: 42
Price (Use Numbers Only, No Special Characters Or $): 25.00
Laser Type: Alexandrite 755nm
Consent: Written consent obtained, risks reviewed including but not limited to crusting, scabbing, blistering, scarring, darker or lighter pigmentary change, paradoxical hair regrowth, incomplete removal of hair and infection.
External Cooling: Ashley Cryo 6
Endpoint: Laser Hair Removal: Immediate endpoint: perifollicular erythema and edema. Vaseline and ice applied. Post care reviewed with patient.\\n\\n
Fluence: 14
External Cooling Fan Speed: 1
Pulse Duration: 3 ms
Cooling: DCD 50/50
Post-Care Instructions: I reviewed with the patient in detail post-care instructions. Patient should avoid sun for a minimum of 4 weeks before and after treatment.
Indication: Laser Hair Removal
Detail Level: Detailed
Spot Size: 18 mm

## 2017-11-08 ENCOUNTER — APPOINTMENT (RX ONLY)
Dept: URBAN - METROPOLITAN AREA CLINIC 24 | Facility: CLINIC | Age: 52
Setting detail: DERMATOLOGY
End: 2017-11-08

## 2017-11-08 DIAGNOSIS — Z41.9 ENCOUNTER FOR PROCEDURE FOR PURPOSES OTHER THAN REMEDYING HEALTH STATE, UNSPECIFIED: ICD-10-CM

## 2017-11-08 PROCEDURE — ? GENTLELASE

## 2017-11-08 ASSESSMENT — LOCATION DETAILED DESCRIPTION DERM: LOCATION DETAILED: MID POSTERIOR NECK

## 2017-11-08 ASSESSMENT — LOCATION ZONE DERM: LOCATION ZONE: NECK

## 2017-11-08 ASSESSMENT — LOCATION SIMPLE DESCRIPTION DERM: LOCATION SIMPLE: POSTERIOR NECK

## 2017-11-08 NOTE — PROCEDURE: GENTLELASE
External Cooling: Ashley Cryo 6
Detail Level: Detailed
Endpoint: Lentigines: Immediate endpoint: perilesional erythema and edema. Vaseline and ice applied. Post care reviewed with patient.
Pulse Duration: 3 ms
Cooling: DCD 50/50
Indication: Laser Hair Removal
Price (Use Numbers Only, No Special Characters Or $): 25.00
Total Pulses: 125
Laser Type: Alexandrite 755nm
Consent: Written consent obtained, risks reviewed including but not limited to crusting, scabbing, blistering, scarring, darker or lighter pigmentary change, paradoxical hair regrowth, incomplete removal of hair and infection.
Post-Care Instructions: I reviewed with the patient in detail post-care instructions. Patient should avoid sun for a minimum of 4 weeks before and after treatment.
Fluence: 14
Endpoint: Laser Hair Removal: Immediate endpoint: perifollicular erythema and edema. Vaseline and ice applied. Post care reviewed with patient.\\n\\n
External Cooling Fan Speed: 1
Spot Size: 18 mm

## 2017-12-18 ENCOUNTER — HOSPITAL ENCOUNTER (OUTPATIENT)
Dept: LAB | Age: 52
Discharge: HOME | End: 2017-12-18

## 2017-12-18 PROCEDURE — 88305 TISSUE EXAM BY PATHOLOGIST: CPT | Performed by: INTERNAL MEDICINE

## 2018-01-30 ENCOUNTER — APPOINTMENT (RX ONLY)
Dept: URBAN - METROPOLITAN AREA CLINIC 24 | Facility: CLINIC | Age: 53
Setting detail: DERMATOLOGY
End: 2018-01-30

## 2018-01-30 DIAGNOSIS — Z41.9 ENCOUNTER FOR PROCEDURE FOR PURPOSES OTHER THAN REMEDYING HEALTH STATE, UNSPECIFIED: ICD-10-CM

## 2018-01-30 PROCEDURE — ? GENTLELASE

## 2018-01-30 ASSESSMENT — LOCATION ZONE DERM
LOCATION ZONE: TRUNK
LOCATION ZONE: NECK

## 2018-01-30 ASSESSMENT — LOCATION SIMPLE DESCRIPTION DERM
LOCATION SIMPLE: RIGHT UPPER BACK
LOCATION SIMPLE: TRAPEZIAL NECK

## 2018-01-30 ASSESSMENT — LOCATION DETAILED DESCRIPTION DERM
LOCATION DETAILED: RIGHT SUPERIOR MEDIAL UPPER BACK
LOCATION DETAILED: MID TRAPEZIAL NECK

## 2018-01-30 NOTE — PROCEDURE: GENTLELASE
External Cooling Fan Speed: 1
Laser Type: Alexandrite 755nm
Cooling: DCD 50/50
Endpoint: Seborrheic Keratoses: Immediate endpoint: perilesional erythema and edema. Vaseline and ice applied. Post care reviewed with patient.
Indication: Laser Hair Removal
Consent: Written consent obtained, risks reviewed including but not limited to crusting, scabbing, blistering, scarring, darker or lighter pigmentary change, paradoxical hair regrowth, incomplete removal of hair and infection.
Price (Use Numbers Only, No Special Characters Or $): 25.00
Detail Level: Detailed
Pulse Duration: 3 ms
Post-Care Instructions: I reviewed with the patient in detail post-care instructions. Patient should avoid sun for a minimum of 4 weeks before and after treatment.
Spot Size: 18 mm
External Cooling: Ashley Cryo 6
Endpoint: Laser Hair Removal: Immediate endpoint: perifollicular erythema and edema. Vaseline and ice applied. Post care reviewed with patient.
Total Pulses: 135
Fluence: 6

## 2018-04-10 ENCOUNTER — APPOINTMENT (RX ONLY)
Dept: URBAN - METROPOLITAN AREA CLINIC 24 | Facility: CLINIC | Age: 53
Setting detail: DERMATOLOGY
End: 2018-04-10

## 2018-04-10 DIAGNOSIS — Z41.9 ENCOUNTER FOR PROCEDURE FOR PURPOSES OTHER THAN REMEDYING HEALTH STATE, UNSPECIFIED: ICD-10-CM

## 2018-04-10 PROBLEM — L55.1 SUNBURN OF SECOND DEGREE: Status: ACTIVE | Noted: 2018-04-10

## 2018-04-10 PROCEDURE — ? GENTLELASE

## 2018-04-10 ASSESSMENT — LOCATION DETAILED DESCRIPTION DERM: LOCATION DETAILED: RIGHT INFERIOR POSTERIOR NECK

## 2018-04-10 ASSESSMENT — LOCATION ZONE DERM: LOCATION ZONE: NECK

## 2018-04-10 ASSESSMENT — LOCATION SIMPLE DESCRIPTION DERM: LOCATION SIMPLE: POSTERIOR NECK

## 2018-04-10 NOTE — PROCEDURE: GENTLELASE
Endpoint: Seborrheic Keratoses: Immediate endpoint: perilesional erythema and edema. Vaseline and ice applied. Post care reviewed with patient.
Cooling: DCD 50/50
Price (Use Numbers Only, No Special Characters Or $): 25.00
Spot Size: 18 mm
Total Pulses: 28
Laser Type: Alexandrite 755nm
Post-Care Instructions: I reviewed with the patient in detail post-care instructions. Patient should avoid sun for a minimum of 4 weeks before and after treatment.
Pulse Duration: 3 ms
Endpoint: Laser Hair Removal: Immediate endpoint: perifollicular erythema and edema. Vaseline and ice applied. Post care reviewed with patient.
Detail Level: Detailed
Fluence: 10
External Cooling Fan Speed: 1
Consent: Written consent obtained, risks reviewed including but not limited to crusting, scabbing, blistering, scarring, darker or lighter pigmentary change, paradoxical hair regrowth, incomplete removal of hair and infection.
External Cooling: Ashley Cryo 6
Indication: Laser Hair Removal

## 2018-05-18 ENCOUNTER — HOSPITAL ENCOUNTER (OUTPATIENT)
Dept: ULTRASOUND IMAGING | Age: 53
Discharge: HOME | End: 2018-05-18
Attending: INTERNAL MEDICINE
Payer: COMMERCIAL

## 2018-05-18 DIAGNOSIS — R10.31 GROIN PAIN, RIGHT: ICD-10-CM

## 2018-05-18 PROCEDURE — 93926 LOWER EXTREMITY STUDY: CPT

## 2018-08-08 ENCOUNTER — APPOINTMENT (RX ONLY)
Dept: URBAN - METROPOLITAN AREA CLINIC 24 | Facility: CLINIC | Age: 53
Setting detail: DERMATOLOGY
End: 2018-08-08

## 2018-08-08 DIAGNOSIS — Z41.9 ENCOUNTER FOR PROCEDURE FOR PURPOSES OTHER THAN REMEDYING HEALTH STATE, UNSPECIFIED: ICD-10-CM

## 2018-08-08 PROBLEM — J30.1 ALLERGIC RHINITIS DUE TO POLLEN: Status: ACTIVE | Noted: 2018-08-08

## 2018-08-08 PROCEDURE — ? GENTLELASE

## 2018-08-08 ASSESSMENT — LOCATION SIMPLE DESCRIPTION DERM: LOCATION SIMPLE: POSTERIOR NECK

## 2018-08-08 ASSESSMENT — LOCATION DETAILED DESCRIPTION DERM
LOCATION DETAILED: LEFT POSTERIOR NECK
LOCATION DETAILED: RIGHT POSTERIOR NECK

## 2018-08-08 ASSESSMENT — LOCATION ZONE DERM: LOCATION ZONE: NECK

## 2018-08-08 NOTE — PROCEDURE: GENTLELASE
External Cooling Fan Speed: 1
Post-Care Instructions: I reviewed with the patient in detail post-care instructions. Patient should avoid sun for a minimum of 4 weeks before and after treatment.
Total Pulses: 81
Consent: Written consent obtained, risks reviewed including but not limited to crusting, scabbing, blistering, scarring, darker or lighter pigmentary change, paradoxical hair regrowth, incomplete removal of hair and infection.
Endpoint: Lentigines: Immediate endpoint: perilesional erythema and edema. Vaseline and ice applied. Post care reviewed with patient.
Laser Type: Alexandrite 755nm
Endpoint: Laser Hair Removal: Immediate endpoint: perifollicular erythema and edema. Vaseline and ice applied. Post care reviewed with patient.
Indication: Laser Hair Removal
Pulse Duration: 3 ms
Price (Use Numbers Only, No Special Characters Or $): 25.00
Spot Size: 18 mm
Detail Level: Detailed
External Cooling: Ashley Cryo 6
Fluence: 14
Cooling: DCD 50/50

## 2018-08-15 ENCOUNTER — APPOINTMENT (RX ONLY)
Dept: URBAN - METROPOLITAN AREA CLINIC 24 | Facility: CLINIC | Age: 53
Setting detail: DERMATOLOGY
End: 2018-08-15

## 2018-08-15 DIAGNOSIS — B00.1 HERPESVIRAL VESICULAR DERMATITIS: ICD-10-CM

## 2018-08-15 DIAGNOSIS — L663 OTHER SPECIFIED DISEASES OF HAIR AND HAIR FOLLICLES: ICD-10-CM

## 2018-08-15 DIAGNOSIS — D22 MELANOCYTIC NEVI: ICD-10-CM

## 2018-08-15 DIAGNOSIS — L82.1 OTHER SEBORRHEIC KERATOSIS: ICD-10-CM

## 2018-08-15 DIAGNOSIS — Z00.00 ENCOUNTER FOR GENERAL ADULT MEDICAL EXAMINATION WITHOUT ABNORMAL FINDINGS: ICD-10-CM

## 2018-08-15 DIAGNOSIS — L82.0 INFLAMED SEBORRHEIC KERATOSIS: ICD-10-CM

## 2018-08-15 DIAGNOSIS — L81.4 OTHER MELANIN HYPERPIGMENTATION: ICD-10-CM

## 2018-08-15 DIAGNOSIS — B07.8 OTHER VIRAL WARTS: ICD-10-CM

## 2018-08-15 DIAGNOSIS — L738 OTHER SPECIFIED DISEASES OF HAIR AND HAIR FOLLICLES: ICD-10-CM

## 2018-08-15 DIAGNOSIS — L73.9 FOLLICULAR DISORDER, UNSPECIFIED: ICD-10-CM

## 2018-08-15 PROBLEM — J30.1 ALLERGIC RHINITIS DUE TO POLLEN: Status: ACTIVE | Noted: 2018-08-15

## 2018-08-15 PROBLEM — L02.424 FURUNCLE OF LEFT UPPER LIMB: Status: ACTIVE | Noted: 2018-08-15

## 2018-08-15 PROBLEM — L70.0 ACNE VULGARIS: Status: ACTIVE | Noted: 2018-08-15

## 2018-08-15 PROBLEM — L02.02 FURUNCLE OF FACE: Status: ACTIVE | Noted: 2018-08-15

## 2018-08-15 PROBLEM — L55.1 SUNBURN OF SECOND DEGREE: Status: ACTIVE | Noted: 2018-08-15

## 2018-08-15 PROBLEM — L02.223 FURUNCLE OF CHEST WALL: Status: ACTIVE | Noted: 2018-08-15

## 2018-08-15 PROBLEM — L02.426 FURUNCLE OF LEFT LOWER LIMB: Status: ACTIVE | Noted: 2018-08-15

## 2018-08-15 PROBLEM — L02.222 FURUNCLE OF BACK [ANY PART, EXCEPT BUTTOCK]: Status: ACTIVE | Noted: 2018-08-15

## 2018-08-15 PROBLEM — D48.5 NEOPLASM OF UNCERTAIN BEHAVIOR OF SKIN: Status: ACTIVE | Noted: 2018-08-15

## 2018-08-15 PROBLEM — L85.3 XEROSIS CUTIS: Status: ACTIVE | Noted: 2018-08-15

## 2018-08-15 PROBLEM — Z71.1 PERSON WITH FEARED HEALTH COMPLAINT IN WHOM NO DIAGNOSIS IS MADE: Status: ACTIVE | Noted: 2018-08-15

## 2018-08-15 PROCEDURE — ? BIOPSY BY SHAVE METHOD

## 2018-08-15 PROCEDURE — ? LIQUID NITROGEN

## 2018-08-15 PROCEDURE — 17110 DESTRUCTION B9 LES UP TO 14: CPT

## 2018-08-15 PROCEDURE — ? OTHER

## 2018-08-15 PROCEDURE — ? COUNSELING

## 2018-08-15 PROCEDURE — ? LIQUID NITROGEN (COSMETIC)

## 2018-08-15 PROCEDURE — 11100: CPT | Mod: 59

## 2018-08-15 PROCEDURE — ? TREATMENT REGIMEN

## 2018-08-15 PROCEDURE — ? PRESCRIPTION

## 2018-08-15 PROCEDURE — 99214 OFFICE O/P EST MOD 30 MIN: CPT | Mod: 25

## 2018-08-15 RX ORDER — CLINDAMYCIN PHOSPHATE 10 MG/ML
SOLUTION TOPICAL
Qty: 1 | Refills: 10 | Status: ERX | COMMUNITY
Start: 2018-08-15

## 2018-08-15 RX ADMIN — CLINDAMYCIN PHOSPHATE: 10 SOLUTION TOPICAL at 00:00

## 2018-08-15 ASSESSMENT — LOCATION ZONE DERM
LOCATION ZONE: ARM
LOCATION ZONE: TRUNK
LOCATION ZONE: FACE
LOCATION ZONE: LEG
LOCATION ZONE: NECK
LOCATION ZONE: HAND

## 2018-08-15 ASSESSMENT — LOCATION SIMPLE DESCRIPTION DERM
LOCATION SIMPLE: LEFT CHEEK
LOCATION SIMPLE: LEFT ZYGOMA
LOCATION SIMPLE: LEFT FOREHEAD
LOCATION SIMPLE: LEFT FOREARM
LOCATION SIMPLE: LEFT UPPER BACK
LOCATION SIMPLE: RIGHT CHEEK
LOCATION SIMPLE: LEFT UPPER ARM
LOCATION SIMPLE: LEFT THIGH
LOCATION SIMPLE: RIGHT SHOULDER
LOCATION SIMPLE: CHEST
LOCATION SIMPLE: RIGHT ANTERIOR NECK
LOCATION SIMPLE: RIGHT HAND
LOCATION SIMPLE: LEFT SHOULDER

## 2018-08-15 ASSESSMENT — LOCATION DETAILED DESCRIPTION DERM
LOCATION DETAILED: RIGHT POSTERIOR SHOULDER
LOCATION DETAILED: RIGHT CLAVICULAR NECK
LOCATION DETAILED: LEFT SUPERIOR MEDIAL FOREHEAD
LOCATION DETAILED: RIGHT ULNAR DORSAL HAND
LOCATION DETAILED: LEFT POSTERIOR SHOULDER
LOCATION DETAILED: RIGHT SUPERIOR LATERAL BUCCAL CHEEK
LOCATION DETAILED: LEFT ANTERIOR DISTAL UPPER ARM
LOCATION DETAILED: LEFT CENTRAL ZYGOMA
LOCATION DETAILED: LEFT SUPERIOR CENTRAL MALAR CHEEK
LOCATION DETAILED: LEFT SUPERIOR MEDIAL UPPER BACK
LOCATION DETAILED: RIGHT RADIAL DORSAL HAND
LOCATION DETAILED: LEFT SUPERIOR LATERAL BUCCAL CHEEK
LOCATION DETAILED: STERNUM
LOCATION DETAILED: LEFT ANTERIOR DISTAL THIGH
LOCATION DETAILED: LEFT DISTAL DORSAL FOREARM

## 2018-08-15 NOTE — PROCEDURE: LIQUID NITROGEN
Post-Care Instructions: I reviewed with the patient in detail post-care instructions. Patient is to wear sunprotection, and avoid picking at any of the treated lesions. Pt may apply Vaseline to crusted or scabbing areas.
Detail Level: Detailed
Medical Necessity Clause: This procedure was medically necessary because the lesions that were treated were:
Add 52 Modifier (Optional): no
Medical Necessity Information: It is in your best interest to select a reason for this procedure from the list below. All of these items fulfill various CMS LCD requirements except the new and changing color options.
Consent: The patient's consent was obtained including but not limited to risks of crusting, scabbing, blistering, scarring, darker or lighter pigmentary change, recurrence, incomplete removal and infection.
Render Post-Care Instructions In Note?: yes

## 2018-08-15 NOTE — PROCEDURE: BIOPSY BY SHAVE METHOD
X Size Of Lesion In Cm: 0
Dressing: bandage
Electrodesiccation And Curettage Text: The wound bed was treated with electrodesiccation and curettage after the biopsy was performed.
Notification Instructions: Patient will be notified of biopsy results. However, patient instructed to call the office if not contacted within 2 weeks.
Curettage Text: The wound bed was treated with curettage after the biopsy was performed.
Consent: Written consent was obtained and risks were reviewed including but not limited to scarring, infection, bleeding, scabbing, incomplete removal, nerve damage and allergy to anesthesia.
Bill [id number] For Specimen Handling/Conveyance To Laboratory?: no
Render Post-Care Instructions In Note?: yes
Detail Level: Detailed
Wound Care: Vaseline
Cryotherapy Text: The wound bed was treated with cryotherapy after the biopsy was performed.
Electrodesiccation Text: The wound bed was treated with electrodesiccation after the biopsy was performed.
Post-Care Instructions: I reviewed with the patient in detail post-care instructions. Patient is to keep the biopsy site dry overnight, and then apply vaseline twice daily until healed. Patient may apply hydrogen peroxide soaks to remove any crusting. Patient given a wound care sheet.
Silver Nitrate Text: The wound bed was treated with silver nitrate after the biopsy was performed.
Depth Of Biopsy: dermis
Hemostasis: Aluminum Chloride
Biopsy Type: H and E
Anesthesia Volume In Cc: 0.1
Type Of Destruction Used: Curettage
Anesthesia Type: 1% lidocaine without epinephrine and a 1:12 solution of 8.4% sodium bicarbonate
Billing Type: Third-Party Bill
Biopsy Method: Personna blade

## 2018-08-15 NOTE — PROCEDURE: TREATMENT REGIMEN
Initiate Treatment: Clindamycin solution and BPO wash to beard area
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 Pt declines treatment today
Detail Level: Zone

## 2018-08-15 NOTE — PROCEDURE: OTHER
Other (Free Text): Pt reports that he has seen his ophtho and has gotten refills. Advised he continue to follow w ophtho for this issue.
Note Text (......Xxx Chief Complaint.): This diagnosis correlates with the
Detail Level: Zone

## 2018-08-15 NOTE — PROCEDURE: LIQUID NITROGEN (COSMETIC)
Render Post-Care Instructions In Note?: yes
Price (Use Numbers Only, No Special Characters Or $): 0
Consent: The patient's consent was obtained including but not limited to risks of crusting, scabbing, blistering, scarring, darker or lighter pigmentary change, recurrence, incomplete removal and infection. The patient understands that the procedure is cosmetic in nature and is not covered by insurance.
Post-Care Instructions: I reviewed with the patient in detail post-care instructions. Patient is to wear sunprotection, and avoid picking at any of the treated lesions. Pt may apply Vaseline to crusted or scabbing areas.
Detail Level: Detailed

## 2018-11-12 ENCOUNTER — APPOINTMENT (RX ONLY)
Dept: URBAN - METROPOLITAN AREA CLINIC 23 | Facility: CLINIC | Age: 53
Setting detail: DERMATOLOGY
End: 2018-11-12

## 2018-11-27 ENCOUNTER — APPOINTMENT (RX ONLY)
Dept: URBAN - METROPOLITAN AREA CLINIC 23 | Facility: CLINIC | Age: 53
Setting detail: DERMATOLOGY
End: 2018-11-27

## 2019-01-03 ENCOUNTER — APPOINTMENT (RX ONLY)
Dept: URBAN - METROPOLITAN AREA CLINIC 23 | Facility: CLINIC | Age: 54
Setting detail: DERMATOLOGY
End: 2019-01-03

## 2019-01-17 ENCOUNTER — APPOINTMENT (RX ONLY)
Dept: URBAN - METROPOLITAN AREA CLINIC 23 | Facility: CLINIC | Age: 54
Setting detail: DERMATOLOGY
End: 2019-01-17

## 2019-01-17 ENCOUNTER — HOSPITAL ENCOUNTER (OUTPATIENT)
Dept: LAB | Age: 54
Discharge: HOME | End: 2019-01-17
Payer: COMMERCIAL

## 2019-01-17 DIAGNOSIS — Z41.9 ENCOUNTER FOR PROCEDURE FOR PURPOSES OTHER THAN REMEDYING HEALTH STATE, UNSPECIFIED: ICD-10-CM

## 2019-01-17 DIAGNOSIS — R07.9 CHEST PAIN, UNSPECIFIED TYPE: ICD-10-CM

## 2019-01-17 DIAGNOSIS — I34.0 NON-RHEUMATIC MITRAL REGURGITATION: ICD-10-CM

## 2019-01-17 DIAGNOSIS — I10 ESSENTIAL HYPERTENSION: Chronic | ICD-10-CM

## 2019-01-17 DIAGNOSIS — J90 PLEURAL EFFUSION: ICD-10-CM

## 2019-01-17 LAB
ANION GAP SERPL CALC-SCNC: 7 MMOL/L
BASOPHILS # BLD: 0.1 K/UL (ref 0–0.2)
BASOPHILS NFR BLD: 1 % (ref 0–2)
BUN SERPL-MCNC: 13 MG/DL (ref 6–23)
CALCIUM SERPL-MCNC: 8.3 MG/DL (ref 8.3–10.4)
CHLORIDE SERPL-SCNC: 108 MMOL/L (ref 98–107)
CO2 SERPL-SCNC: 27 MMOL/L (ref 21–32)
CREAT SERPL-MCNC: 1.3 MG/DL (ref 0.8–1.5)
D DIMER PPP FEU-MCNC: 0.42 UG/ML(FEU)
DIFFERENTIAL METHOD BLD: ABNORMAL
EOSINOPHIL # BLD: 0.2 K/UL (ref 0–0.8)
EOSINOPHIL NFR BLD: 3 % (ref 0.5–7.8)
ERYTHROCYTE [DISTWIDTH] IN BLOOD BY AUTOMATED COUNT: 11.9 % (ref 11.9–14.6)
GLUCOSE SERPL-MCNC: 94 MG/DL (ref 65–100)
HCT VFR BLD AUTO: 45.9 % (ref 41.1–50.3)
HGB BLD-MCNC: 16.2 G/DL (ref 13.6–17.2)
IMM GRANULOCYTES # BLD AUTO: 0 K/UL (ref 0–0.5)
IMM GRANULOCYTES NFR BLD AUTO: 1 % (ref 0–5)
LYMPHOCYTES # BLD: 1.6 K/UL (ref 0.5–4.6)
LYMPHOCYTES NFR BLD: 25 % (ref 13–44)
MCH RBC QN AUTO: 31 PG (ref 26.1–32.9)
MCHC RBC AUTO-ENTMCNC: 35.3 G/DL (ref 31.4–35)
MCV RBC AUTO: 87.9 FL (ref 79.6–97.8)
MONOCYTES # BLD: 0.5 K/UL (ref 0.1–1.3)
MONOCYTES NFR BLD: 9 % (ref 4–12)
NEUTS SEG # BLD: 4 K/UL (ref 1.7–8.2)
NEUTS SEG NFR BLD: 62 % (ref 43–78)
NRBC # BLD: 0 K/UL (ref 0–0.2)
PLATELET # BLD AUTO: 201 K/UL (ref 150–450)
PMV BLD AUTO: 11.2 FL (ref 9.4–12.3)
POTASSIUM SERPL-SCNC: 3.8 MMOL/L (ref 3.5–5.1)
RBC # BLD AUTO: 5.22 M/UL (ref 4.23–5.6)
SODIUM SERPL-SCNC: 142 MMOL/L (ref 136–145)
WBC # BLD AUTO: 6.4 K/UL (ref 4.3–11.1)

## 2019-01-17 PROCEDURE — 80048 BASIC METABOLIC PNL TOTAL CA: CPT

## 2019-01-17 PROCEDURE — 85379 FIBRIN DEGRADATION QUANT: CPT

## 2019-01-17 PROCEDURE — 36415 COLL VENOUS BLD VENIPUNCTURE: CPT

## 2019-01-17 PROCEDURE — ? GENTLELASE

## 2019-01-17 PROCEDURE — 85025 COMPLETE CBC W/AUTO DIFF WBC: CPT

## 2019-01-17 ASSESSMENT — LOCATION DETAILED DESCRIPTION DERM
LOCATION DETAILED: RIGHT MEDIAL UPPER BACK
LOCATION DETAILED: LEFT MID-UPPER BACK
LOCATION DETAILED: RIGHT SUPERIOR LATERAL LOWER BACK
LOCATION DETAILED: RIGHT SUPERIOR UPPER BACK
LOCATION DETAILED: LEFT SUPERIOR MEDIAL LOWER BACK
LOCATION DETAILED: LEFT MEDIAL TRAPEZIAL NECK

## 2019-01-17 ASSESSMENT — LOCATION SIMPLE DESCRIPTION DERM
LOCATION SIMPLE: POSTERIOR NECK
LOCATION SIMPLE: RIGHT UPPER BACK
LOCATION SIMPLE: LEFT LOWER BACK
LOCATION SIMPLE: RIGHT LOWER BACK
LOCATION SIMPLE: LEFT UPPER BACK

## 2019-01-17 ASSESSMENT — LOCATION ZONE DERM
LOCATION ZONE: TRUNK
LOCATION ZONE: NECK

## 2019-01-17 NOTE — PROCEDURE: GENTLELASE
Fluence: 12
Endpoint: Laser Hair Removal: Immediate endpoint: perifollicular erythema and edema. Vaseline and ice applied. Post care reviewed with patient.
Endpoint: Seborrheic Keratoses: Immediate endpoint: perilesional erythema and edema. Vaseline and ice applied. Post care reviewed with patient.
Laser Type: Alexandrite 755nm
Indication: Laser Hair Removal
Post-Care Instructions: I reviewed with the patient in detail post-care instructions. Patient should avoid sun for a minimum of 4 weeks before and after treatment.
Price (Use Numbers Only, No Special Characters Or $): 25.00
External Cooling Fan Speed: 0
Consent: Written consent obtained, risks reviewed including but not limited to crusting, scabbing, blistering, scarring, darker or lighter pigmentary change, paradoxical hair regrowth, incomplete removal of hair and infection.
Spot Size: 18 mm
Total Pulses: 200
Detail Level: Detailed
Cooling: DCD setting
Pulse Duration: 3 ms

## 2019-03-08 ENCOUNTER — APPOINTMENT (RX ONLY)
Dept: URBAN - METROPOLITAN AREA CLINIC 23 | Facility: CLINIC | Age: 54
Setting detail: DERMATOLOGY
End: 2019-03-08

## 2019-03-08 DIAGNOSIS — Z41.9 ENCOUNTER FOR PROCEDURE FOR PURPOSES OTHER THAN REMEDYING HEALTH STATE, UNSPECIFIED: ICD-10-CM

## 2019-03-08 PROCEDURE — ? GENTLELASE

## 2019-03-08 ASSESSMENT — LOCATION SIMPLE DESCRIPTION DERM
LOCATION SIMPLE: RIGHT LOWER BACK
LOCATION SIMPLE: UPPER BACK
LOCATION SIMPLE: POSTERIOR NECK

## 2019-03-08 ASSESSMENT — LOCATION DETAILED DESCRIPTION DERM
LOCATION DETAILED: LEFT INFERIOR POSTERIOR NECK
LOCATION DETAILED: RIGHT INFERIOR POSTERIOR NECK
LOCATION DETAILED: INFERIOR THORACIC SPINE
LOCATION DETAILED: RIGHT INFERIOR MEDIAL MIDBACK

## 2019-03-08 ASSESSMENT — LOCATION ZONE DERM
LOCATION ZONE: NECK
LOCATION ZONE: TRUNK

## 2019-03-08 NOTE — PROCEDURE: GENTLELASE
Endpoint: Lentigines: Immediate endpoint: perilesional erythema and edema. Vaseline and ice applied. Post care reviewed with patient.
Cooling: DCD setting
Price (Use Numbers Only, No Special Characters Or $): 25
Endpoint: Laser Hair Removal: Immediate endpoint: perifollicular erythema and edema. Vaseline and ice applied. Post care reviewed with patient.
Indication: Laser Hair Removal
Total Pulses: 250
Consent: Written consent obtained, risks reviewed including but not limited to crusting, scabbing, blistering, scarring, darker or lighter pigmentary change, paradoxical hair regrowth, incomplete removal of hair and infection.
External Cooling Fan Speed: 0
Spot Size: 18 mm
Fluence: 12
Laser Type: Alexandrite 755nm
Post-Care Instructions: I reviewed with the patient in detail post-care instructions. Patient should avoid sun for a minimum of 4 weeks before and after treatment.
Pulse Duration: 3 ms
Detail Level: Detailed

## 2019-05-10 ENCOUNTER — APPOINTMENT (RX ONLY)
Dept: URBAN - METROPOLITAN AREA CLINIC 23 | Facility: CLINIC | Age: 54
Setting detail: DERMATOLOGY
End: 2019-05-10

## 2019-06-12 PROBLEM — E66.01 OBESITY, MORBID (HCC): Status: ACTIVE | Noted: 2019-06-12

## 2019-06-20 ENCOUNTER — APPOINTMENT (RX ONLY)
Dept: URBAN - METROPOLITAN AREA CLINIC 23 | Facility: CLINIC | Age: 54
Setting detail: DERMATOLOGY
End: 2019-06-20

## 2019-06-20 DIAGNOSIS — Z41.9 ENCOUNTER FOR PROCEDURE FOR PURPOSES OTHER THAN REMEDYING HEALTH STATE, UNSPECIFIED: ICD-10-CM

## 2019-06-20 PROCEDURE — ? GENTLELASE

## 2019-06-20 ASSESSMENT — LOCATION SIMPLE DESCRIPTION DERM
LOCATION SIMPLE: RIGHT LOWER BACK
LOCATION SIMPLE: LEFT LOWER BACK
LOCATION SIMPLE: RIGHT SHOULDER
LOCATION SIMPLE: RIGHT UPPER BACK
LOCATION SIMPLE: LEFT SHOULDER
LOCATION SIMPLE: POSTERIOR NECK

## 2019-06-20 ASSESSMENT — LOCATION DETAILED DESCRIPTION DERM
LOCATION DETAILED: RIGHT POSTERIOR SHOULDER
LOCATION DETAILED: RIGHT MID-UPPER BACK
LOCATION DETAILED: RIGHT SUPERIOR MEDIAL UPPER BACK
LOCATION DETAILED: MID POSTERIOR NECK
LOCATION DETAILED: RIGHT SUPERIOR MEDIAL LOWER BACK
LOCATION DETAILED: LEFT POSTERIOR SHOULDER
LOCATION DETAILED: LEFT SUPERIOR LATERAL MIDBACK

## 2019-06-20 ASSESSMENT — LOCATION ZONE DERM
LOCATION ZONE: TRUNK
LOCATION ZONE: ARM
LOCATION ZONE: NECK

## 2019-06-20 NOTE — PROCEDURE: GENTLELASE
Post-Care Instructions: I reviewed with the patient in detail post-care instructions. Patient should avoid sun for a minimum of 4 weeks before and after treatment.
Fluence: 9
Indication: Laser Hair Removal
Laser Type: Alexandrite 755nm
Total Pulses: 500
Detail Level: Detailed
Spot Size: 18 mm
Endpoint: Seborrheic Keratoses: Immediate endpoint: perilesional erythema and edema. Vaseline and ice applied. Post care reviewed with patient.
Endpoint: Laser Hair Removal: Immediate endpoint: perifollicular erythema and edema. Vaseline and ice applied. Post care reviewed with patient.\\n\\n
Consent: Written consent obtained, risks reviewed including but not limited to crusting, scabbing, blistering, scarring, darker or lighter pigmentary change, paradoxical hair regrowth, incomplete removal of hair and infection.
Pulse Duration: 3 ms
Price (Use Numbers Only, No Special Characters Or $): 25
External Cooling Fan Speed: 0
Cooling: DCD setting

## 2019-10-04 ENCOUNTER — RX ONLY (OUTPATIENT)
Age: 54
Setting detail: RX ONLY
End: 2019-10-04

## 2019-10-04 RX ORDER — IMIQUIMOD 37.5 MG/G
CREAM TOPICAL
Qty: 1 | Refills: 2 | Status: ERX | COMMUNITY
Start: 2019-10-04

## 2019-10-29 ENCOUNTER — APPOINTMENT (RX ONLY)
Dept: URBAN - METROPOLITAN AREA CLINIC 23 | Facility: CLINIC | Age: 54
Setting detail: DERMATOLOGY
End: 2019-10-29

## 2019-10-29 DIAGNOSIS — Z41.9 ENCOUNTER FOR PROCEDURE FOR PURPOSES OTHER THAN REMEDYING HEALTH STATE, UNSPECIFIED: ICD-10-CM

## 2019-10-29 PROBLEM — J30.1 ALLERGIC RHINITIS DUE TO POLLEN: Status: ACTIVE | Noted: 2019-10-29

## 2019-10-29 PROBLEM — L85.3 XEROSIS CUTIS: Status: ACTIVE | Noted: 2019-10-29

## 2019-10-29 PROBLEM — L70.0 ACNE VULGARIS: Status: ACTIVE | Noted: 2019-10-29

## 2020-01-23 ENCOUNTER — APPOINTMENT (RX ONLY)
Dept: URBAN - METROPOLITAN AREA CLINIC 24 | Facility: CLINIC | Age: 55
Setting detail: DERMATOLOGY
End: 2020-01-23

## 2020-01-23 DIAGNOSIS — L81.4 OTHER MELANIN HYPERPIGMENTATION: ICD-10-CM

## 2020-01-23 DIAGNOSIS — D18.0 HEMANGIOMA: ICD-10-CM

## 2020-01-23 DIAGNOSIS — L82.1 OTHER SEBORRHEIC KERATOSIS: ICD-10-CM

## 2020-01-23 DIAGNOSIS — L738 OTHER SPECIFIED DISEASES OF HAIR AND HAIR FOLLICLES: ICD-10-CM

## 2020-01-23 DIAGNOSIS — L82.0 INFLAMED SEBORRHEIC KERATOSIS: ICD-10-CM

## 2020-01-23 DIAGNOSIS — L73.9 FOLLICULAR DISORDER, UNSPECIFIED: ICD-10-CM

## 2020-01-23 DIAGNOSIS — L663 OTHER SPECIFIED DISEASES OF HAIR AND HAIR FOLLICLES: ICD-10-CM

## 2020-01-23 DIAGNOSIS — L57.0 ACTINIC KERATOSIS: ICD-10-CM

## 2020-01-23 PROBLEM — L02.423 FURUNCLE OF RIGHT UPPER LIMB: Status: ACTIVE | Noted: 2020-01-23

## 2020-01-23 PROBLEM — D18.01 HEMANGIOMA OF SKIN AND SUBCUTANEOUS TISSUE: Status: ACTIVE | Noted: 2020-01-23

## 2020-01-23 PROBLEM — J30.1 ALLERGIC RHINITIS DUE TO POLLEN: Status: ACTIVE | Noted: 2020-01-23

## 2020-01-23 PROBLEM — L85.3 XEROSIS CUTIS: Status: ACTIVE | Noted: 2020-01-23

## 2020-01-23 PROBLEM — L70.0 ACNE VULGARIS: Status: ACTIVE | Noted: 2020-01-23

## 2020-01-23 PROBLEM — L02.422 FURUNCLE OF LEFT AXILLA: Status: ACTIVE | Noted: 2020-01-23

## 2020-01-23 PROBLEM — L55.1 SUNBURN OF SECOND DEGREE: Status: ACTIVE | Noted: 2020-01-23

## 2020-01-23 PROBLEM — L02.424 FURUNCLE OF LEFT UPPER LIMB: Status: ACTIVE | Noted: 2020-01-23

## 2020-01-23 PROBLEM — L02.421 FURUNCLE OF RIGHT AXILLA: Status: ACTIVE | Noted: 2020-01-23

## 2020-01-23 PROCEDURE — 17000 DESTRUCT PREMALG LESION: CPT | Mod: 59

## 2020-01-23 PROCEDURE — 99214 OFFICE O/P EST MOD 30 MIN: CPT | Mod: 25

## 2020-01-23 PROCEDURE — 17003 DESTRUCT PREMALG LES 2-14: CPT | Mod: 59

## 2020-01-23 PROCEDURE — ? PRESCRIPTION

## 2020-01-23 PROCEDURE — ? LIQUID NITROGEN

## 2020-01-23 PROCEDURE — 17110 DESTRUCTION B9 LES UP TO 14: CPT

## 2020-01-23 PROCEDURE — ? COUNSELING

## 2020-01-23 RX ORDER — CLINDAMYCIN PHOSPHATE 10 MG/ML
SOLUTION TOPICAL
Qty: 1 | Refills: 11 | Status: ERX

## 2020-01-23 ASSESSMENT — LOCATION DETAILED DESCRIPTION DERM
LOCATION DETAILED: PERIUMBILICAL SKIN
LOCATION DETAILED: RIGHT PROXIMAL DORSAL FOREARM
LOCATION DETAILED: RIGHT DISTAL DORSAL FOREARM
LOCATION DETAILED: RIGHT ULNAR DORSAL HAND
LOCATION DETAILED: LEFT SUPERIOR MEDIAL FOREHEAD
LOCATION DETAILED: LEFT DISTAL DORSAL FOREARM
LOCATION DETAILED: LEFT ANTERIOR PROXIMAL UPPER ARM
LOCATION DETAILED: RIGHT ANTERIOR MEDIAL PROXIMAL UPPER ARM
LOCATION DETAILED: LEFT ANTERIOR MEDIAL PROXIMAL UPPER ARM
LOCATION DETAILED: LEFT POSTERIOR SHOULDER
LOCATION DETAILED: RIGHT DISTAL RADIAL DORSAL FOREARM
LOCATION DETAILED: RIGHT AXILLARY VAULT
LOCATION DETAILED: LEFT RADIAL DORSAL HAND
LOCATION DETAILED: RIGHT POSTERIOR SHOULDER
LOCATION DETAILED: RIGHT VENTRAL PROXIMAL FOREARM
LOCATION DETAILED: RIGHT PROXIMAL RADIAL DORSAL FOREARM
LOCATION DETAILED: 3RD WEB SPACE LEFT HAND
LOCATION DETAILED: LEFT INFERIOR ANTERIOR NECK
LOCATION DETAILED: RIGHT CLAVICULAR NECK
LOCATION DETAILED: LEFT POSTERIOR AXILLA
LOCATION DETAILED: LEFT PROXIMAL RADIAL DORSAL FOREARM

## 2020-01-23 ASSESSMENT — LOCATION ZONE DERM
LOCATION ZONE: AXILLAE
LOCATION ZONE: FACE
LOCATION ZONE: HAND
LOCATION ZONE: NECK
LOCATION ZONE: ARM
LOCATION ZONE: TRUNK

## 2020-01-23 ASSESSMENT — LOCATION SIMPLE DESCRIPTION DERM
LOCATION SIMPLE: LEFT FOREARM
LOCATION SIMPLE: LEFT ANTERIOR NECK
LOCATION SIMPLE: ABDOMEN
LOCATION SIMPLE: RIGHT SHOULDER
LOCATION SIMPLE: RIGHT FOREARM
LOCATION SIMPLE: RIGHT AXILLARY VAULT
LOCATION SIMPLE: RIGHT UPPER ARM
LOCATION SIMPLE: LEFT UPPER ARM
LOCATION SIMPLE: RIGHT HAND
LOCATION SIMPLE: RIGHT ANTERIOR NECK
LOCATION SIMPLE: LEFT HAND
LOCATION SIMPLE: LEFT SHOULDER
LOCATION SIMPLE: LEFT FOREHEAD
LOCATION SIMPLE: LEFT POSTERIOR AXILLA

## 2020-01-23 NOTE — PROCEDURE: LIQUID NITROGEN
Medical Necessity Information: It is in your best interest to select a reason for this procedure from the list below. All of these items fulfill various CMS LCD requirements except the new and changing color options.
Detail Level: Detailed
Medical Necessity Clause: This procedure was medically necessary because the lesions that were treated were: rubbed
Render Post-Care Instructions In Note?: yes
Include Z78.9 (Other Specified Conditions Influencing Health Status) As An Associated Diagnosis?: No
Post-Care Instructions: I reviewed with the patient in detail post-care instructions. Patient is to wear sunprotection, and avoid picking at any of the treated lesions. Pt may apply Vaseline to crusted or scabbing areas.
Number Of Freeze-Thaw Cycles: 1 freeze-thaw cycle
Duration Of Freeze Thaw-Cycle (Seconds): 0
Consent: The patient's consent was obtained including but not limited to risks of crusting, scabbing, blistering, scarring, darker or lighter pigmentary change, recurrence, incomplete removal and infection.

## 2020-02-05 ENCOUNTER — APPOINTMENT (RX ONLY)
Dept: URBAN - METROPOLITAN AREA CLINIC 23 | Facility: CLINIC | Age: 55
Setting detail: DERMATOLOGY
End: 2020-02-05

## 2020-02-05 DIAGNOSIS — Z41.9 ENCOUNTER FOR PROCEDURE FOR PURPOSES OTHER THAN REMEDYING HEALTH STATE, UNSPECIFIED: ICD-10-CM

## 2020-02-05 PROBLEM — L70.0 ACNE VULGARIS: Status: ACTIVE | Noted: 2020-02-05

## 2020-02-05 PROBLEM — L85.3 XEROSIS CUTIS: Status: ACTIVE | Noted: 2020-02-05

## 2020-02-05 PROCEDURE — ? GENTLELASE

## 2020-02-05 ASSESSMENT — LOCATION DETAILED DESCRIPTION DERM
LOCATION DETAILED: RIGHT MID-UPPER BACK
LOCATION DETAILED: RIGHT INFERIOR POSTERIOR NECK
LOCATION DETAILED: LEFT MID-UPPER BACK
LOCATION DETAILED: LEFT MEDIAL TRAPEZIAL NECK
LOCATION DETAILED: RIGHT SUPERIOR UPPER BACK

## 2020-02-05 ASSESSMENT — LOCATION SIMPLE DESCRIPTION DERM
LOCATION SIMPLE: RIGHT UPPER BACK
LOCATION SIMPLE: LEFT UPPER BACK
LOCATION SIMPLE: POSTERIOR NECK

## 2020-02-05 ASSESSMENT — LOCATION ZONE DERM
LOCATION ZONE: TRUNK
LOCATION ZONE: NECK

## 2020-02-05 NOTE — PROCEDURE: GENTLELASE
Endpoint: Laser Hair Removal: Immediate endpoint: perifollicular erythema and edema. Vaseline and ice applied. Post care reviewed with patient.\\n\\n
External Cooling Fan Speed: 0
Fluence: 16
Indication: Laser Hair Removal
Cooling: DCD setting
Total Pulses: 500
Endpoint: Seborrheic Keratoses: Immediate endpoint: perilesional erythema and edema. Vaseline and ice applied. Post care reviewed with patient.
Detail Level: Detailed
Laser Type: Alexandrite 755nm
Spot Size: 18 mm
Post-Care Instructions: I reviewed with the patient in detail post-care instructions. Patient should avoid sun for a minimum of 4 weeks before and after treatment.
Price (Use Numbers Only, No Special Characters Or $): 25
Pulse Duration: 5 ms
Consent: Written consent obtained, risks reviewed including but not limited to crusting, scabbing, blistering, scarring, darker or lighter pigmentary change, paradoxical hair regrowth, incomplete removal of hair and infection.

## 2020-06-05 ENCOUNTER — APPOINTMENT (RX ONLY)
Dept: URBAN - METROPOLITAN AREA CLINIC 23 | Facility: CLINIC | Age: 55
Setting detail: DERMATOLOGY
End: 2020-06-05

## 2020-06-05 DIAGNOSIS — Z41.9 ENCOUNTER FOR PROCEDURE FOR PURPOSES OTHER THAN REMEDYING HEALTH STATE, UNSPECIFIED: ICD-10-CM

## 2020-06-05 PROBLEM — L85.3 XEROSIS CUTIS: Status: ACTIVE | Noted: 2020-06-05

## 2020-06-05 PROBLEM — L70.0 ACNE VULGARIS: Status: ACTIVE | Noted: 2020-06-05

## 2020-06-05 PROCEDURE — ? GENTLELASE

## 2020-06-05 ASSESSMENT — LOCATION DETAILED DESCRIPTION DERM
LOCATION DETAILED: RIGHT SUPERIOR LATERAL MIDBACK
LOCATION DETAILED: RIGHT MEDIAL UPPER BACK
LOCATION DETAILED: LEFT SUPERIOR LATERAL UPPER BACK
LOCATION DETAILED: LEFT INFERIOR UPPER BACK
LOCATION DETAILED: RIGHT POSTERIOR SHOULDER
LOCATION DETAILED: RIGHT SUPERIOR LATERAL LOWER BACK
LOCATION DETAILED: LEFT SUPERIOR LATERAL LOWER BACK

## 2020-06-05 ASSESSMENT — LOCATION SIMPLE DESCRIPTION DERM
LOCATION SIMPLE: RIGHT SHOULDER
LOCATION SIMPLE: LEFT LOWER BACK
LOCATION SIMPLE: LEFT UPPER BACK
LOCATION SIMPLE: RIGHT UPPER BACK
LOCATION SIMPLE: RIGHT LOWER BACK

## 2020-06-05 ASSESSMENT — LOCATION ZONE DERM
LOCATION ZONE: ARM
LOCATION ZONE: TRUNK

## 2020-06-05 NOTE — HPI: COSMETIC (LASER HAIR REMOVAL)
previous_has_had_previous_treatment
Additional History: Suggested coming back in the fall (neck was too dark to treat so only treated back)\\nUpper shoulders mild hair\\nMid back mild patches\\nlower back mild patches\\nNeck has most thickest growth but cant treat it cause its always dark\\nOnce hes back in the fall come in every 6 weeks to knock all the hairs out!

## 2020-06-05 NOTE — PROCEDURE: GENTLELASE
External Cooling Fan Speed: 0
Consent: Written consent obtained, risks reviewed including but not limited to crusting, scabbing, blistering, scarring, darker or lighter pigmentary change, paradoxical hair regrowth, incomplete removal of hair and infection.
Endpoint: Seborrheic Keratoses: Immediate endpoint: perilesional erythema and edema. Vaseline and ice applied. Post care reviewed with patient.
Pulse Duration: 5 ms
Total Pulses: 500
Fluence: 12
Price (Use Numbers Only, No Special Characters Or $): 25
Post-Care Instructions: I reviewed with the patient in detail post-care instructions. Patient should avoid sun for a minimum of 4 weeks before and after treatment.
Endpoint: Laser Hair Removal: Immediate endpoint: perifollicular erythema and edema. Vaseline and ice applied. Post care reviewed with patient.\\n\\n
Indication: Laser Hair Removal
Laser Type: Alexandrite 755nm
Spot Size: 18 mm
Cooling: DCD setting
Detail Level: Detailed

## 2020-09-25 ENCOUNTER — APPOINTMENT (RX ONLY)
Dept: URBAN - METROPOLITAN AREA CLINIC 23 | Facility: CLINIC | Age: 55
Setting detail: DERMATOLOGY
End: 2020-09-25

## 2020-09-25 DIAGNOSIS — Z41.9 ENCOUNTER FOR PROCEDURE FOR PURPOSES OTHER THAN REMEDYING HEALTH STATE, UNSPECIFIED: ICD-10-CM

## 2020-09-25 PROCEDURE — ? GENTLELASE

## 2020-09-25 ASSESSMENT — LOCATION DETAILED DESCRIPTION DERM
LOCATION DETAILED: LEFT INFERIOR MEDIAL MIDBACK
LOCATION DETAILED: RIGHT INFERIOR LATERAL MIDBACK
LOCATION DETAILED: LEFT MEDIAL TRAPEZIAL NECK
LOCATION DETAILED: RIGHT POSTERIOR SHOULDER
LOCATION DETAILED: RIGHT INFERIOR LATERAL UPPER BACK
LOCATION DETAILED: LEFT POSTERIOR SHOULDER
LOCATION DETAILED: LEFT MID-UPPER BACK

## 2020-09-25 ASSESSMENT — LOCATION ZONE DERM
LOCATION ZONE: NECK
LOCATION ZONE: ARM
LOCATION ZONE: TRUNK

## 2020-09-25 ASSESSMENT — LOCATION SIMPLE DESCRIPTION DERM
LOCATION SIMPLE: RIGHT SHOULDER
LOCATION SIMPLE: RIGHT LOWER BACK
LOCATION SIMPLE: LEFT LOWER BACK
LOCATION SIMPLE: LEFT UPPER BACK
LOCATION SIMPLE: POSTERIOR NECK
LOCATION SIMPLE: LEFT SHOULDER
LOCATION SIMPLE: RIGHT UPPER BACK

## 2020-09-25 NOTE — PROCEDURE: GENTLELASE
Price (Use Numbers Only, No Special Characters Or $): 25
Laser Type: Alexandrite 755nm
Endpoint: Laser Hair Removal: Immediate endpoint: perifollicular erythema and edema. Vaseline and ice applied. Post care reviewed with patient.\\n\\n
Consent: Written consent obtained, risks reviewed including but not limited to crusting, scabbing, blistering, scarring, darker or lighter pigmentary change, paradoxical hair regrowth, incomplete removal of hair and infection.
Indication: Laser Hair Removal
Endpoint: Seborrheic Keratoses: Immediate endpoint: perilesional erythema and edema. Vaseline and ice applied. Post care reviewed with patient.
Fluence: 12
Spot Size: 18 mm
External Cooling Fan Speed: 0
Detail Level: Detailed
Cooling: DCD 30/20
Total Pulses: 500
Post-Care Instructions: I reviewed with the patient in detail post-care instructions. Patient should avoid sun for a minimum of 4 weeks before and after treatment.
Pulse Duration: 10 ms

## 2020-11-11 ENCOUNTER — APPOINTMENT (RX ONLY)
Dept: URBAN - METROPOLITAN AREA CLINIC 23 | Facility: CLINIC | Age: 55
Setting detail: DERMATOLOGY
End: 2020-11-11

## 2020-11-23 ENCOUNTER — APPOINTMENT (RX ONLY)
Dept: URBAN - METROPOLITAN AREA CLINIC 23 | Facility: CLINIC | Age: 55
Setting detail: DERMATOLOGY
End: 2020-11-23

## 2020-11-23 DIAGNOSIS — Z41.9 ENCOUNTER FOR PROCEDURE FOR PURPOSES OTHER THAN REMEDYING HEALTH STATE, UNSPECIFIED: ICD-10-CM

## 2020-11-23 PROCEDURE — ? GENTLELASE

## 2020-11-23 ASSESSMENT — LOCATION SIMPLE DESCRIPTION DERM
LOCATION SIMPLE: LEFT UPPER BACK
LOCATION SIMPLE: LEFT LOWER BACK
LOCATION SIMPLE: LEFT SHOULDER
LOCATION SIMPLE: POSTERIOR NECK
LOCATION SIMPLE: RIGHT SHOULDER
LOCATION SIMPLE: RIGHT LOWER BACK

## 2020-11-23 ASSESSMENT — LOCATION DETAILED DESCRIPTION DERM
LOCATION DETAILED: LEFT INFERIOR LATERAL MIDBACK
LOCATION DETAILED: RIGHT SUPERIOR LATERAL MIDBACK
LOCATION DETAILED: LEFT MID-UPPER BACK
LOCATION DETAILED: LEFT POSTERIOR SHOULDER
LOCATION DETAILED: RIGHT POSTERIOR SHOULDER
LOCATION DETAILED: MID POSTERIOR NECK
LOCATION DETAILED: RIGHT SUPERIOR LATERAL LOWER BACK

## 2020-11-23 ASSESSMENT — LOCATION ZONE DERM
LOCATION ZONE: NECK
LOCATION ZONE: TRUNK
LOCATION ZONE: ARM

## 2020-11-23 NOTE — PROCEDURE: GENTLELASE
Post-Care Instructions: I reviewed with the patient in detail post-care instructions. Patient should avoid sun for a minimum of 4 weeks before and after treatment.
Spot Size: 18 mm
Endpoint: Seborrheic Keratoses: Immediate endpoint: perilesional erythema and edema. Vaseline and ice applied. Post care reviewed with patient.
External Cooling Fan Speed: 0
Consent: Written consent obtained, risks reviewed including but not limited to crusting, scabbing, blistering, scarring, darker or lighter pigmentary change, paradoxical hair regrowth, incomplete removal of hair and infection.
Fluence: 14
Cooling: DCD 30/20
Price (Use Numbers Only, No Special Characters Or $): 25
Endpoint: Laser Hair Removal: Immediate endpoint: perifollicular erythema and edema. Vaseline and ice applied. Post care reviewed with patient.\\n\\n
Laser Type: Alexandrite 755nm
Pulse Duration: 5 ms
Total Pulses: 500
Detail Level: Detailed
Indication: Laser Hair Removal

## 2020-12-07 NOTE — PROCEDURE: LIQUID NITROGEN
Dr Khan
Medical Necessity Information: It is in your best interest to select a reason for this procedure from the list below. All of these items fulfill various CMS LCD requirements except the new and changing color options.
Render Post-Care Instructions In Note?: yes
Detail Level: Detailed
Duration Of Freeze Thaw-Cycle (Seconds): 0
Include Z78.9 (Other Specified Conditions Influencing Health Status) As An Associated Diagnosis?: No
Consent: The patient's consent was obtained including but not limited to risks of crusting, scabbing, blistering, scarring, darker or lighter pigmentary change, recurrence, incomplete removal and infection.
Medical Necessity Clause: This procedure was medically necessary because the lesions that were treated were: clothes
Post-Care Instructions: I reviewed with the patient in detail post-care instructions. Patient is to wear sunprotection, and avoid picking at any of the treated lesions. Pt may apply Vaseline to crusted or scabbing areas.

## 2021-02-03 ENCOUNTER — APPOINTMENT (RX ONLY)
Dept: URBAN - METROPOLITAN AREA CLINIC 24 | Facility: CLINIC | Age: 56
Setting detail: DERMATOLOGY
End: 2021-02-03

## 2021-02-03 NOTE — HPI: FULL BODY SKIN EXAMINATION
What Is The Reason For Today's Visit?: Full Body Skin Examination
What Is The Reason For Today's Visit? (Being Monitored For X): concerning skin lesions on an annual basis
How Severe Are Your Spot(S)?: mild
Additional History: Pt gave verbal consent for treatment/bx today. Witnessed by yared

## 2021-02-18 ENCOUNTER — APPOINTMENT (RX ONLY)
Dept: URBAN - METROPOLITAN AREA CLINIC 23 | Facility: CLINIC | Age: 56
Setting detail: DERMATOLOGY
End: 2021-02-18

## 2021-02-18 DIAGNOSIS — Z41.9 ENCOUNTER FOR PROCEDURE FOR PURPOSES OTHER THAN REMEDYING HEALTH STATE, UNSPECIFIED: ICD-10-CM

## 2021-02-18 PROCEDURE — ? GENTLELASE

## 2021-02-18 ASSESSMENT — LOCATION ZONE DERM
LOCATION ZONE: TRUNK
LOCATION ZONE: ARM

## 2021-02-18 ASSESSMENT — LOCATION SIMPLE DESCRIPTION DERM
LOCATION SIMPLE: RIGHT LOWER BACK
LOCATION SIMPLE: LEFT LOWER BACK
LOCATION SIMPLE: RIGHT UPPER BACK
LOCATION SIMPLE: LEFT UPPER BACK
LOCATION SIMPLE: RIGHT SHOULDER
LOCATION SIMPLE: LEFT SHOULDER

## 2021-02-18 ASSESSMENT — LOCATION DETAILED DESCRIPTION DERM
LOCATION DETAILED: LEFT POSTERIOR SHOULDER
LOCATION DETAILED: LEFT SUPERIOR MEDIAL UPPER BACK
LOCATION DETAILED: RIGHT POSTERIOR SHOULDER
LOCATION DETAILED: RIGHT LATERAL UPPER BACK
LOCATION DETAILED: LEFT INFERIOR LATERAL UPPER BACK
LOCATION DETAILED: LEFT SUPERIOR MEDIAL LOWER BACK
LOCATION DETAILED: RIGHT INFERIOR LATERAL MIDBACK

## 2021-02-18 NOTE — PROCEDURE: GENTLELASE
Detail Level: Detailed
Spot Size: 18 mm
Fluence: 12
Consent: Written consent obtained, risks reviewed including but not limited to crusting, scabbing, blistering, scarring, darker or lighter pigmentary change, paradoxical hair regrowth, incomplete removal of hair and infection.
Endpoint: Laser Hair Removal: Immediate endpoint: perifollicular erythema and edema. Vaseline and ice applied. Post care reviewed with patient.\\n\\n
Laser Type: Alexandrite 755nm
Cooling: DCD 30/20
External Cooling Fan Speed: 0
Indication: Laser Hair Removal
Pulse Duration: 10 ms
Endpoint: Lentigines: Immediate endpoint: perilesional erythema and edema. Vaseline and ice applied. Post care reviewed with patient.
Post-Care Instructions: I reviewed with the patient in detail post-care instructions. Patient should avoid sun for a minimum of 4 weeks before and after treatment.

## 2021-03-25 ENCOUNTER — APPOINTMENT (RX ONLY)
Dept: URBAN - METROPOLITAN AREA CLINIC 24 | Facility: CLINIC | Age: 56
Setting detail: DERMATOLOGY
End: 2021-03-25

## 2021-03-25 DIAGNOSIS — L738 OTHER SPECIFIED DISEASES OF HAIR AND HAIR FOLLICLES: ICD-10-CM | Status: INADEQUATELY CONTROLLED

## 2021-03-25 DIAGNOSIS — L82.1 OTHER SEBORRHEIC KERATOSIS: ICD-10-CM

## 2021-03-25 DIAGNOSIS — L663 OTHER SPECIFIED DISEASES OF HAIR AND HAIR FOLLICLES: ICD-10-CM | Status: INADEQUATELY CONTROLLED

## 2021-03-25 DIAGNOSIS — L73.9 FOLLICULAR DISORDER, UNSPECIFIED: ICD-10-CM | Status: INADEQUATELY CONTROLLED

## 2021-03-25 DIAGNOSIS — L57.8 OTHER SKIN CHANGES DUE TO CHRONIC EXPOSURE TO NONIONIZING RADIATION: ICD-10-CM

## 2021-03-25 DIAGNOSIS — D18.0 HEMANGIOMA: ICD-10-CM

## 2021-03-25 DIAGNOSIS — L82.0 INFLAMED SEBORRHEIC KERATOSIS: ICD-10-CM

## 2021-03-25 PROBLEM — L02.222 FURUNCLE OF BACK [ANY PART, EXCEPT BUTTOCK]: Status: ACTIVE | Noted: 2021-03-25

## 2021-03-25 PROBLEM — L55.1 SUNBURN OF SECOND DEGREE: Status: ACTIVE | Noted: 2021-03-25

## 2021-03-25 PROBLEM — L02.223 FURUNCLE OF CHEST WALL: Status: ACTIVE | Noted: 2021-03-25

## 2021-03-25 PROBLEM — L70.0 ACNE VULGARIS: Status: ACTIVE | Noted: 2021-03-25

## 2021-03-25 PROBLEM — D18.01 HEMANGIOMA OF SKIN AND SUBCUTANEOUS TISSUE: Status: ACTIVE | Noted: 2021-03-25

## 2021-03-25 PROBLEM — L02.424 FURUNCLE OF LEFT UPPER LIMB: Status: ACTIVE | Noted: 2021-03-25

## 2021-03-25 PROCEDURE — ? OTHER

## 2021-03-25 PROCEDURE — ? LIQUID NITROGEN

## 2021-03-25 PROCEDURE — ? COUNSELING

## 2021-03-25 PROCEDURE — 17110 DESTRUCTION B9 LES UP TO 14: CPT

## 2021-03-25 PROCEDURE — ? PRESCRIPTION

## 2021-03-25 PROCEDURE — 99214 OFFICE O/P EST MOD 30 MIN: CPT | Mod: 25

## 2021-03-25 RX ORDER — CLINDAMYCIN PHOSPHATE 10 MG/ML
SOLUTION TOPICAL
Qty: 1 | Refills: 11 | Status: ERX | COMMUNITY
Start: 2021-03-25

## 2021-03-25 RX ADMIN — CLINDAMYCIN PHOSPHATE: 10 SOLUTION TOPICAL at 00:00

## 2021-03-25 ASSESSMENT — LOCATION SIMPLE DESCRIPTION DERM
LOCATION SIMPLE: LEFT UPPER BACK
LOCATION SIMPLE: LEFT ANTERIOR NECK
LOCATION SIMPLE: RIGHT UPPER BACK
LOCATION SIMPLE: LEFT FOREARM
LOCATION SIMPLE: CHEST
LOCATION SIMPLE: LEFT UPPER ARM
LOCATION SIMPLE: RIGHT FOREHEAD
LOCATION SIMPLE: ABDOMEN
LOCATION SIMPLE: LEFT FOREHEAD
LOCATION SIMPLE: LEFT HAND
LOCATION SIMPLE: RIGHT FOREARM

## 2021-03-25 ASSESSMENT — LOCATION DETAILED DESCRIPTION DERM
LOCATION DETAILED: LEFT RADIAL DORSAL HAND
LOCATION DETAILED: PERIUMBILICAL SKIN
LOCATION DETAILED: LEFT PROXIMAL POSTERIOR UPPER ARM
LOCATION DETAILED: LEFT MEDIAL SUPERIOR CHEST
LOCATION DETAILED: RIGHT MID-UPPER BACK
LOCATION DETAILED: LEFT SUPERIOR FOREHEAD
LOCATION DETAILED: LEFT MID-UPPER BACK
LOCATION DETAILED: RIGHT SUPERIOR MEDIAL FOREHEAD
LOCATION DETAILED: LEFT CLAVICULAR NECK
LOCATION DETAILED: RIGHT DISTAL DORSAL FOREARM
LOCATION DETAILED: LEFT PROXIMAL RADIAL DORSAL FOREARM

## 2021-03-25 ASSESSMENT — LOCATION ZONE DERM
LOCATION ZONE: ARM
LOCATION ZONE: TRUNK
LOCATION ZONE: NECK
LOCATION ZONE: HAND
LOCATION ZONE: FACE

## 2021-03-25 NOTE — HPI: FULL BODY SKIN EXAMINATION
What Type Of Note Output Would You Prefer (Optional)?: Standard Output
What Is The Reason For Today's Visit?: Full Body Skin Examination
What Is The Reason For Today's Visit? (Being Monitored For X): concerning skin lesions on an annual basis
How Severe Are Your Spot(S)?: mild
Additional History: Pt gave verbal consent for Bx today. Witnessed by Elena

## 2021-03-25 NOTE — PROCEDURE: LIQUID NITROGEN
Medical Necessity Clause: This procedure was medically necessary because the lesions that were treated were:
Medical Necessity Information: It is in your best interest to select a reason for this procedure from the list below. All of these items fulfill various CMS LCD requirements except the new and changing color options.
Post-Care Instructions: I reviewed with the patient in detail post-care instructions. Patient is to wear sunprotection, and avoid picking at any of the treated lesions. Pt may apply Vaseline to crusted or scabbing areas.
Render Note In Bullet Format When Appropriate: No
Render Post-Care Instructions In Note?: yes
Number Of Freeze-Thaw Cycles: 1 freeze-thaw cycle
Detail Level: Detailed
Consent: The patient's consent was obtained including but not limited to risks of crusting, scabbing, blistering, scarring, darker or lighter pigmentary change, recurrence, incomplete removal and infection.

## 2021-03-25 NOTE — PROCEDURE: OTHER
Detail Level: Zone
Note Text (......Xxx Chief Complaint.): This diagnosis correlates with the
Other (Free Text): He still has significant active Folliculitis and he has had biopsies showing pityrosporum. He does have areas that appear bacterial as well. He has been treated with a variety of oral antibiotics as well as fluconazole and topical keto without significant improvement. He has not wanted to escalated care. \\n\\nI have recommended the VaniCream Z Bar alternated w BPO wash in the shower. He uses Clindamycin PRN.
Render Risk Assessment In Note?: no

## 2021-03-30 ENCOUNTER — HOSPITAL ENCOUNTER (OUTPATIENT)
Dept: LAB | Age: 56
Discharge: HOME | End: 2021-03-30
Payer: COMMERCIAL

## 2021-03-30 DIAGNOSIS — I49.3 PVC (PREMATURE VENTRICULAR CONTRACTION): ICD-10-CM

## 2021-03-30 LAB
ANION GAP SERPL CALC-SCNC: 2 MMOL/L (ref 7–16)
BUN SERPL-MCNC: 11 MG/DL (ref 6–23)
CALCIUM SERPL-MCNC: 9.3 MG/DL (ref 8.3–10.4)
CHLORIDE SERPL-SCNC: 103 MMOL/L (ref 98–107)
CO2 SERPL-SCNC: 33 MMOL/L (ref 21–32)
CREAT SERPL-MCNC: 1.27 MG/DL (ref 0.8–1.5)
GLUCOSE SERPL-MCNC: 91 MG/DL (ref 65–100)
MAGNESIUM SERPL-MCNC: 2.1 MG/DL (ref 1.8–2.4)
POTASSIUM SERPL-SCNC: 3.7 MMOL/L (ref 3.5–5.1)
SODIUM SERPL-SCNC: 138 MMOL/L (ref 138–145)

## 2021-03-30 PROCEDURE — 36415 COLL VENOUS BLD VENIPUNCTURE: CPT

## 2021-03-30 PROCEDURE — 80048 BASIC METABOLIC PNL TOTAL CA: CPT

## 2021-03-30 PROCEDURE — 83735 ASSAY OF MAGNESIUM: CPT

## 2021-07-06 ENCOUNTER — HOSPITAL ENCOUNTER (OUTPATIENT)
Dept: LAB | Age: 56
Discharge: HOME | End: 2021-07-06

## 2021-07-06 PROCEDURE — 88305 TISSUE EXAM BY PATHOLOGIST: CPT

## 2021-07-07 ENCOUNTER — APPOINTMENT (RX ONLY)
Dept: URBAN - METROPOLITAN AREA CLINIC 23 | Facility: CLINIC | Age: 56
Setting detail: DERMATOLOGY
End: 2021-07-07

## 2021-10-07 ENCOUNTER — APPOINTMENT (RX ONLY)
Dept: URBAN - METROPOLITAN AREA CLINIC 23 | Facility: CLINIC | Age: 56
Setting detail: DERMATOLOGY
End: 2021-10-07

## 2021-10-07 DIAGNOSIS — Z41.9 ENCOUNTER FOR PROCEDURE FOR PURPOSES OTHER THAN REMEDYING HEALTH STATE, UNSPECIFIED: ICD-10-CM

## 2021-10-07 PROCEDURE — ? GENTLELASE

## 2021-10-07 ASSESSMENT — LOCATION ZONE DERM: LOCATION ZONE: NECK

## 2021-10-07 ASSESSMENT — LOCATION SIMPLE DESCRIPTION DERM: LOCATION SIMPLE: POSTERIOR NECK

## 2021-10-07 ASSESSMENT — LOCATION DETAILED DESCRIPTION DERM
LOCATION DETAILED: LEFT POSTERIOR NECK
LOCATION DETAILED: RIGHT INFERIOR POSTERIOR NECK

## 2021-10-07 NOTE — PROCEDURE: GENTLELASE
Fluence: 8
Detail Level: Detailed
Spot Size: 20 mm
Endpoint: Lentigines: Immediate endpoint: perilesional erythema and edema. Vaseline and ice applied. Post care reviewed with patient.
Post-Care Instructions: I reviewed with the patient in detail post-care instructions. Patient should avoid sun for a minimum of 4 weeks before and after treatment.
External Cooling Fan Speed: 0
Endpoint: Laser Hair Removal: Immediate endpoint: perifollicular erythema and edema. Vaseline and ice applied. Post care reviewed with patient.\\n\\n
Consent: Written consent obtained, risks reviewed including but not limited to crusting, scabbing, blistering, scarring, darker or lighter pigmentary change, paradoxical hair regrowth, incomplete removal of hair and infection.
Pulse Duration: 10 ms
Price (Use Numbers Only, No Special Characters Or $): 25
Indication: Laser Hair Removal
Laser Type: Alexandrite 755nm
Cooling: DCD 30/20

## 2021-10-07 NOTE — HPI: COSMETIC (LASER HAIR REMOVAL)
Additional History: Back of neck , 8 juhsl 10ms\\nrest 11 juhls 3ms\\ndo it every 3-4 months until summer and take summer off\\n8 -12 weeks apart

## 2021-12-30 ENCOUNTER — APPOINTMENT (RX ONLY)
Dept: URBAN - METROPOLITAN AREA CLINIC 23 | Facility: CLINIC | Age: 56
Setting detail: DERMATOLOGY
End: 2021-12-30

## 2021-12-30 DIAGNOSIS — Z41.9 ENCOUNTER FOR PROCEDURE FOR PURPOSES OTHER THAN REMEDYING HEALTH STATE, UNSPECIFIED: ICD-10-CM

## 2021-12-30 PROCEDURE — ? GENTLELASE

## 2021-12-30 ASSESSMENT — LOCATION ZONE DERM
LOCATION ZONE: ARM
LOCATION ZONE: NECK
LOCATION ZONE: TRUNK

## 2021-12-30 ASSESSMENT — LOCATION DETAILED DESCRIPTION DERM
LOCATION DETAILED: LEFT SUPERIOR MEDIAL MIDBACK
LOCATION DETAILED: LEFT POSTERIOR SHOULDER
LOCATION DETAILED: RIGHT POSTERIOR SHOULDER
LOCATION DETAILED: RIGHT INFERIOR UPPER BACK
LOCATION DETAILED: LEFT SUPERIOR MEDIAL LOWER BACK
LOCATION DETAILED: RIGHT SUPERIOR UPPER BACK
LOCATION DETAILED: RIGHT SUPERIOR MEDIAL LOWER BACK
LOCATION DETAILED: LEFT POSTERIOR NECK

## 2021-12-30 ASSESSMENT — LOCATION SIMPLE DESCRIPTION DERM
LOCATION SIMPLE: POSTERIOR NECK
LOCATION SIMPLE: LEFT LOWER BACK
LOCATION SIMPLE: RIGHT LOWER BACK
LOCATION SIMPLE: RIGHT UPPER BACK
LOCATION SIMPLE: RIGHT SHOULDER
LOCATION SIMPLE: LEFT SHOULDER

## 2021-12-30 NOTE — PROCEDURE: GENTLELASE
Cooling: DCD 30/20
Detail Level: Detailed
Endpoint: Seborrheic Keratoses: Immediate endpoint: perilesional erythema and edema. Vaseline and ice applied. Post care reviewed with patient.
Consent: Written consent obtained, risks reviewed including but not limited to crusting, scabbing, blistering, scarring, darker or lighter pigmentary change, paradoxical hair regrowth, incomplete removal of hair and infection.
Indication: Laser Hair Removal
Pulse Duration: 3 ms
Post-Care Instructions: I reviewed with the patient in detail post-care instructions. Patient should avoid sun for a minimum of 4 weeks before and after treatment.
Laser Type: Alexandrite 755nm
Fluence: 14
Spot Size: 20 mm
External Cooling Fan Speed: 0
Endpoint: Laser Hair Removal: Immediate endpoint: perifollicular erythema and edema. Vaseline and ice applied. Post care reviewed with patient.\\n\\n

## 2022-03-03 ENCOUNTER — APPOINTMENT (RX ONLY)
Dept: URBAN - METROPOLITAN AREA CLINIC 23 | Facility: CLINIC | Age: 57
Setting detail: DERMATOLOGY
End: 2022-03-03

## 2022-03-03 DIAGNOSIS — Z41.9 ENCOUNTER FOR PROCEDURE FOR PURPOSES OTHER THAN REMEDYING HEALTH STATE, UNSPECIFIED: ICD-10-CM

## 2022-03-03 PROBLEM — L55.1 SUNBURN OF SECOND DEGREE: Status: ACTIVE | Noted: 2022-03-03

## 2022-03-03 PROCEDURE — ? GENTLELASE

## 2022-03-03 ASSESSMENT — LOCATION DETAILED DESCRIPTION DERM
LOCATION DETAILED: LEFT POSTERIOR NECK
LOCATION DETAILED: LEFT INFERIOR UPPER BACK
LOCATION DETAILED: RIGHT INFERIOR POSTERIOR NECK
LOCATION DETAILED: RIGHT SUPERIOR LATERAL MIDBACK
LOCATION DETAILED: RIGHT LATERAL UPPER BACK
LOCATION DETAILED: SUPERIOR THORACIC SPINE
LOCATION DETAILED: LEFT SUPERIOR MEDIAL LOWER BACK

## 2022-03-03 ASSESSMENT — LOCATION SIMPLE DESCRIPTION DERM
LOCATION SIMPLE: POSTERIOR NECK
LOCATION SIMPLE: RIGHT LOWER BACK
LOCATION SIMPLE: RIGHT UPPER BACK
LOCATION SIMPLE: LEFT LOWER BACK
LOCATION SIMPLE: LEFT UPPER BACK
LOCATION SIMPLE: UPPER BACK

## 2022-03-03 ASSESSMENT — LOCATION ZONE DERM
LOCATION ZONE: TRUNK
LOCATION ZONE: NECK

## 2022-03-03 NOTE — PROCEDURE: GENTLELASE
Fluence: 12
Post-Care Instructions: I reviewed with the patient in detail post-care instructions. Patient should avoid sun for a minimum of 4 weeks before and after treatment.
Endpoint: Seborrheic Keratoses: Immediate endpoint: perilesional erythema and edema. Vaseline and ice applied. Post care reviewed with patient.
Pulse Duration: 3 ms
Spot Size: 18 mm
Detail Level: Detailed
Endpoint: Laser Hair Removal: Immediate endpoint: perifollicular erythema and edema. Vaseline and ice applied. Post care reviewed with patient.\\n\\n
Cooling: DCD 30/20
Indication: Laser Hair Removal
Laser Type: Alexandrite 755nm
Consent: Written consent obtained, risks reviewed including but not limited to crusting, scabbing, blistering, scarring, darker or lighter pigmentary change, paradoxical hair regrowth, incomplete removal of hair and infection.
External Cooling Fan Speed: 0
Price (Use Numbers Only, No Special Characters Or $): 25

## 2022-03-18 PROBLEM — E04.1 THYROID NODULE: Status: ACTIVE | Noted: 2017-04-23

## 2022-03-18 PROBLEM — R06.02 SHORTNESS OF BREATH: Status: ACTIVE | Noted: 2017-03-07

## 2022-03-18 PROBLEM — E66.01 OBESITY, MORBID (HCC): Status: ACTIVE | Noted: 2019-06-12

## 2022-03-18 PROBLEM — G47.33 OSA (OBSTRUCTIVE SLEEP APNEA): Status: ACTIVE | Noted: 2017-03-07

## 2022-03-18 PROBLEM — R79.89 ELEVATED TROPONIN: Status: ACTIVE | Noted: 2017-04-27

## 2022-03-18 PROBLEM — R77.8 ELEVATED TROPONIN: Status: ACTIVE | Noted: 2017-04-27

## 2022-03-19 PROBLEM — I34.0 MITRAL INSUFFICIENCY: Status: ACTIVE | Noted: 2017-03-08

## 2022-03-19 PROBLEM — I34.0 MITRAL VALVE REGURGITATION: Status: ACTIVE | Noted: 2017-04-21

## 2022-03-19 PROBLEM — I34.0 MITRAL REGURGITATION: Status: ACTIVE | Noted: 2017-03-07

## 2022-03-19 PROBLEM — K21.9 GERD (GASTROESOPHAGEAL REFLUX DISEASE): Status: ACTIVE | Noted: 2017-03-07

## 2022-03-19 PROBLEM — K85.90 PANCREATITIS: Status: ACTIVE | Noted: 2017-04-27

## 2022-03-19 PROBLEM — M25.511 RIGHT SHOULDER PAIN: Status: ACTIVE | Noted: 2017-04-24

## 2022-03-19 PROBLEM — J90 PLEURAL EFFUSION: Status: ACTIVE | Noted: 2017-04-27

## 2022-03-19 PROBLEM — J98.11 ATELECTASIS: Status: ACTIVE | Noted: 2017-04-27

## 2022-03-20 PROBLEM — R07.9 CHEST PAIN: Status: ACTIVE | Noted: 2017-04-27

## 2022-03-20 PROBLEM — G45.9 TIA (TRANSIENT ISCHEMIC ATTACK): Status: ACTIVE | Noted: 2017-03-07

## 2022-03-20 PROBLEM — I10 ESSENTIAL HYPERTENSION: Status: ACTIVE | Noted: 2017-03-07

## 2022-03-31 ENCOUNTER — APPOINTMENT (RX ONLY)
Dept: URBAN - METROPOLITAN AREA CLINIC 24 | Facility: CLINIC | Age: 57
Setting detail: DERMATOLOGY
End: 2022-03-31

## 2022-03-31 DIAGNOSIS — L57.8 OTHER SKIN CHANGES DUE TO CHRONIC EXPOSURE TO NONIONIZING RADIATION: ICD-10-CM

## 2022-03-31 PROCEDURE — ? COUNSELING

## 2022-03-31 ASSESSMENT — LOCATION DETAILED DESCRIPTION DERM: LOCATION DETAILED: LEFT SUPERIOR FOREHEAD

## 2022-03-31 ASSESSMENT — LOCATION SIMPLE DESCRIPTION DERM: LOCATION SIMPLE: LEFT FOREHEAD

## 2022-03-31 ASSESSMENT — LOCATION ZONE DERM: LOCATION ZONE: FACE

## 2022-03-31 NOTE — HPI: FULL BODY SKIN EXAMINATION
What Type Of Note Output Would You Prefer (Optional)?: Standard Output
What Is The Reason For Today's Visit?: Full Body Skin Examination
What Is The Reason For Today's Visit? (Being Monitored For X): concerning skin lesions on an annual basis
How Severe Are Your Spot(S)?: mild
Additional History: Pt gives verbal consent for biopsy.Viky

## 2022-04-28 ENCOUNTER — APPOINTMENT (RX ONLY)
Dept: URBAN - METROPOLITAN AREA CLINIC 24 | Facility: CLINIC | Age: 57
Setting detail: DERMATOLOGY
End: 2022-04-28

## 2022-04-28 DIAGNOSIS — L738 OTHER SPECIFIED DISEASES OF HAIR AND HAIR FOLLICLES: ICD-10-CM

## 2022-04-28 DIAGNOSIS — L82.1 OTHER SEBORRHEIC KERATOSIS: ICD-10-CM

## 2022-04-28 DIAGNOSIS — L663 OTHER SPECIFIED DISEASES OF HAIR AND HAIR FOLLICLES: ICD-10-CM

## 2022-04-28 DIAGNOSIS — L57.0 ACTINIC KERATOSIS: ICD-10-CM

## 2022-04-28 DIAGNOSIS — L57.8 OTHER SKIN CHANGES DUE TO CHRONIC EXPOSURE TO NONIONIZING RADIATION: ICD-10-CM

## 2022-04-28 DIAGNOSIS — L30.9 DERMATITIS, UNSPECIFIED: ICD-10-CM

## 2022-04-28 DIAGNOSIS — L82.0 INFLAMED SEBORRHEIC KERATOSIS: ICD-10-CM

## 2022-04-28 DIAGNOSIS — L73.9 FOLLICULAR DISORDER, UNSPECIFIED: ICD-10-CM

## 2022-04-28 PROBLEM — L70.0 ACNE VULGARIS: Status: ACTIVE | Noted: 2022-04-28

## 2022-04-28 PROBLEM — L02.424 FURUNCLE OF LEFT UPPER LIMB: Status: ACTIVE | Noted: 2022-04-28

## 2022-04-28 PROBLEM — J30.1 ALLERGIC RHINITIS DUE TO POLLEN: Status: ACTIVE | Noted: 2022-04-28

## 2022-04-28 PROBLEM — L02.223 FURUNCLE OF CHEST WALL: Status: ACTIVE | Noted: 2022-04-28

## 2022-04-28 PROBLEM — L02.222 FURUNCLE OF BACK [ANY PART, EXCEPT BUTTOCK]: Status: ACTIVE | Noted: 2022-04-28

## 2022-04-28 PROBLEM — L55.1 SUNBURN OF SECOND DEGREE: Status: ACTIVE | Noted: 2022-04-28

## 2022-04-28 PROCEDURE — ? TREATMENT REGIMEN

## 2022-04-28 PROCEDURE — ? OTHER

## 2022-04-28 PROCEDURE — 17000 DESTRUCT PREMALG LESION: CPT | Mod: 59

## 2022-04-28 PROCEDURE — ? LIQUID NITROGEN

## 2022-04-28 PROCEDURE — 17110 DESTRUCTION B9 LES UP TO 14: CPT

## 2022-04-28 PROCEDURE — ? COUNSELING

## 2022-04-28 PROCEDURE — 99214 OFFICE O/P EST MOD 30 MIN: CPT | Mod: 25

## 2022-04-28 PROCEDURE — ? PRESCRIPTION

## 2022-04-28 PROCEDURE — 17003 DESTRUCT PREMALG LES 2-14: CPT | Mod: 59

## 2022-04-28 RX ORDER — CLINDAMYCIN PHOSPHATE 10 MG/ML
SOLUTION TOPICAL
Qty: 60 | Refills: 11 | Status: ERX | COMMUNITY
Start: 2022-04-28

## 2022-04-28 RX ORDER — ECONAZOLE NITRATE 10 MG/G
AEROSOL, FOAM TOPICAL
Qty: 1 | Refills: 6 | Status: ERX | COMMUNITY
Start: 2022-04-28

## 2022-04-28 RX ADMIN — CLINDAMYCIN PHOSPHATE: 10 SOLUTION TOPICAL at 00:00

## 2022-04-28 RX ADMIN — ECONAZOLE NITRATE: 10 AEROSOL, FOAM TOPICAL at 00:00

## 2022-04-28 ASSESSMENT — LOCATION ZONE DERM
LOCATION ZONE: TRUNK
LOCATION ZONE: ARM
LOCATION ZONE: NECK
LOCATION ZONE: FACE
LOCATION ZONE: TRUNK
LOCATION ZONE: EYELID

## 2022-04-28 ASSESSMENT — LOCATION DETAILED DESCRIPTION DERM
LOCATION DETAILED: LEFT SUPERIOR FOREHEAD
LOCATION DETAILED: RIGHT POSTERIOR SHOULDER
LOCATION DETAILED: STERNUM
LOCATION DETAILED: STERNUM
LOCATION DETAILED: LEFT POSTERIOR SHOULDER
LOCATION DETAILED: LEFT CLAVICULAR NECK
LOCATION DETAILED: RIGHT MID-UPPER BACK
LOCATION DETAILED: LEFT SUPERIOR LATERAL NECK
LOCATION DETAILED: LEFT MID-UPPER BACK
LOCATION DETAILED: INFERIOR THORACIC SPINE
LOCATION DETAILED: LEFT INFERIOR MEDIAL MALAR CHEEK
LOCATION DETAILED: LEFT MEDIAL SUPERIOR CHEST
LOCATION DETAILED: LEFT MID-UPPER BACK
LOCATION DETAILED: LEFT PROXIMAL POSTERIOR UPPER ARM
LOCATION DETAILED: RIGHT MEDIAL SUPERIOR EYELID
LOCATION DETAILED: LEFT DISTAL RADIAL DORSAL FOREARM
LOCATION DETAILED: LEFT DISTAL DORSAL FOREARM
LOCATION DETAILED: LEFT PROXIMAL RADIAL DORSAL FOREARM

## 2022-04-28 ASSESSMENT — LOCATION SIMPLE DESCRIPTION DERM
LOCATION SIMPLE: LEFT FOREHEAD
LOCATION SIMPLE: RIGHT SUPERIOR EYELID
LOCATION SIMPLE: RIGHT UPPER BACK
LOCATION SIMPLE: LEFT UPPER BACK
LOCATION SIMPLE: LEFT SHOULDER
LOCATION SIMPLE: LEFT UPPER BACK
LOCATION SIMPLE: LEFT UPPER ARM
LOCATION SIMPLE: CHEST
LOCATION SIMPLE: CHEST
LOCATION SIMPLE: RIGHT SHOULDER
LOCATION SIMPLE: NECK
LOCATION SIMPLE: UPPER BACK
LOCATION SIMPLE: LEFT ANTERIOR NECK
LOCATION SIMPLE: LEFT FOREARM
LOCATION SIMPLE: LEFT CHEEK

## 2022-04-28 NOTE — HPI: FULL BODY SKIN EXAMINATION
What Type Of Note Output Would You Prefer (Optional)?: Standard Output
What Is The Reason For Today's Visit?: Full Body Skin Examination
What Is The Reason For Today's Visit? (Being Monitored For X): concerning skin lesions on an annual basis
How Severe Are Your Spot(S)?: mild
Additional History: Pt gives verbal consent to biopsy. SHABNAM Castano

## 2022-04-28 NOTE — PROCEDURE: OTHER
Detail Level: Zone
Note Text (......Xxx Chief Complaint.): This diagnosis correlates with the
Other (Free Text): He still has significant active Folliculitis and he has had biopsies showing pityrosporum. He does have areas that appear bacterial as well and underarms concerning for HS. He has been treated with a variety of oral antibiotics as well as fluconazole and topical keto without significant improvement. He has not wanted to escalated care. \\n\\nContinue VaniCream Z Bar alternated w BPO wash in the shower. He uses Clindamycin PRN and will try to get econazole foam covered.
Render Risk Assessment In Note?: no

## 2022-04-28 NOTE — PROCEDURE: LIQUID NITROGEN
Show Applicator Variable?: Yes
Medical Necessity Information: It is in your best interest to select a reason for this procedure from the list below. All of these items fulfill various CMS LCD requirements except the new and changing color options.
Detail Level: Detailed
Render Note In Bullet Format When Appropriate: No
Consent: The patient's consent was obtained including but not limited to risks of crusting, scabbing, blistering, scarring, darker or lighter pigmentary change, recurrence, incomplete removal and infection.
Post-Care Instructions: I reviewed with the patient in detail post-care instructions. Patient is to wear sunprotection, and avoid picking at any of the treated lesions. Pt may apply Vaseline to crusted or scabbing areas.
Spray Paint Text: The liquid nitrogen was applied to the skin utilizing a spray paint frosting technique.
Number Of Freeze-Thaw Cycles: 1 freeze-thaw cycle
Duration Of Freeze Thaw-Cycle (Seconds): 0
Medical Necessity Clause: This procedure was medically necessary because the lesions that were treated were: rubbed

## 2022-05-19 ENCOUNTER — APPOINTMENT (RX ONLY)
Dept: URBAN - METROPOLITAN AREA CLINIC 23 | Facility: CLINIC | Age: 57
Setting detail: DERMATOLOGY
End: 2022-05-19

## 2022-05-19 DIAGNOSIS — Z41.9 ENCOUNTER FOR PROCEDURE FOR PURPOSES OTHER THAN REMEDYING HEALTH STATE, UNSPECIFIED: ICD-10-CM

## 2022-05-19 PROCEDURE — ? GENTLELASE

## 2022-05-19 ASSESSMENT — LOCATION DETAILED DESCRIPTION DERM
LOCATION DETAILED: RIGHT INFERIOR UPPER BACK
LOCATION DETAILED: LEFT SUPERIOR LATERAL MIDBACK
LOCATION DETAILED: LEFT SUPERIOR LATERAL UPPER BACK
LOCATION DETAILED: RIGHT SUPERIOR LATERAL UPPER BACK
LOCATION DETAILED: MID TRAPEZIAL NECK
LOCATION DETAILED: LEFT SUPERIOR LATERAL MALAR CHEEK
LOCATION DETAILED: RIGHT SUPERIOR CRUS OF ANTIHELIX

## 2022-05-19 ASSESSMENT — LOCATION ZONE DERM
LOCATION ZONE: NECK
LOCATION ZONE: TRUNK
LOCATION ZONE: EAR
LOCATION ZONE: FACE

## 2022-05-19 ASSESSMENT — LOCATION SIMPLE DESCRIPTION DERM
LOCATION SIMPLE: LEFT LOWER BACK
LOCATION SIMPLE: RIGHT UPPER BACK
LOCATION SIMPLE: LEFT CHEEK
LOCATION SIMPLE: TRAPEZIAL NECK
LOCATION SIMPLE: LEFT UPPER BACK
LOCATION SIMPLE: RIGHT EAR

## 2022-05-19 NOTE — PROCEDURE: GENTLELASE
Endpoint: Seborrheic Keratoses: Immediate endpoint: perilesional erythema and edema. Vaseline and ice applied. Post care reviewed with patient.
Laser Type: Alexandrite 755nm
Consent: Written consent obtained, risks reviewed including but not limited to crusting, scabbing, blistering, scarring, darker or lighter pigmentary change, paradoxical hair regrowth, incomplete removal of hair and infection.
Cooling: DCD setting
External Cooling Fan Speed: 0
Pulse Duration: 3 ms
Fluence: 9
Post-Care Instructions: I reviewed with the patient in detail post-care instructions. Patient should avoid sun for a minimum of 4 weeks before and after treatment.
Detail Level: Detailed
Endpoint: Laser Hair Removal: Immediate endpoint: perifollicular erythema and edema. Vaseline and ice applied. Post care reviewed with patient.\\n\\n
Price (Use Numbers Only, No Special Characters Or $): 40
Indication: Laser Hair Removal
Spot Size: 20 mm

## 2022-07-22 ENCOUNTER — OFFICE VISIT (OUTPATIENT)
Dept: CARDIOLOGY CLINIC | Age: 57
End: 2022-07-22
Payer: COMMERCIAL

## 2022-07-22 VITALS
HEIGHT: 71 IN | DIASTOLIC BLOOD PRESSURE: 98 MMHG | SYSTOLIC BLOOD PRESSURE: 160 MMHG | BODY MASS INDEX: 38.79 KG/M2 | WEIGHT: 277.1 LBS | HEART RATE: 71 BPM

## 2022-07-22 DIAGNOSIS — I10 ESSENTIAL HYPERTENSION: Primary | ICD-10-CM

## 2022-07-22 DIAGNOSIS — Z98.890 S/P MITRAL VALVE REPAIR: ICD-10-CM

## 2022-07-22 DIAGNOSIS — R06.09 DYSPNEA ON EXERTION: ICD-10-CM

## 2022-07-22 PROCEDURE — 99214 OFFICE O/P EST MOD 30 MIN: CPT | Performed by: INTERNAL MEDICINE

## 2022-07-22 PROCEDURE — 93000 ELECTROCARDIOGRAM COMPLETE: CPT | Performed by: INTERNAL MEDICINE

## 2022-07-22 RX ORDER — VALSARTAN 160 MG/1
160 TABLET ORAL DAILY
Qty: 30 TABLET | Refills: 11 | Status: SHIPPED | OUTPATIENT
Start: 2022-07-22 | End: 2022-08-16

## 2022-07-22 ASSESSMENT — ENCOUNTER SYMPTOMS
ORTHOPNEA: 0
DOUBLE VISION: 0
HEMOPTYSIS: 0
VOMITING: 0
BLOATING: 0
COUGH: 0
BLURRED VISION: 0
BACK PAIN: 0
SHORTNESS OF BREATH: 0
NAUSEA: 0
ABDOMINAL PAIN: 0

## 2022-07-22 NOTE — PROGRESS NOTES
[facility]  [address], [address]  PHONE: [phone]    22    NAME:  Libby Hamm  : 1965  MRN: [id number]         SUBJECTIVE:   Libby Hamm is a 56 y.o. male seen for a visit regarding the following:     Chief Complaint   Patient presents with    Hypertension       HPI:      History of severe MR status post mitral valve repair [P2 resection and 32 mm annuloplasty ring; 17]. Noted postop echo with preserved EF and no mitral regurgitation. Preop coronary angiogram with normal coronaries [3/17]. Admission with pancreatitis and atypical chest pain (17). Prior down issues with nephrolithiasis ()     Mostly with some issues with elevated BPs. States some intermittent dizziness and 1 night woke up with some transient chest discomfort. Blood with systolics in the 277Q to 271G. Was on HCTZ 25 mg daily which was stopped. States can still walk over a mile with no issues. Prior--No new issues since last visit. No chest pain. Can walk over a mile with no sig issues. No BOND. Down about 6 pounds since last visit. Denies any palpitations. Prior---Still with some issues with atypical sharp right-sided chest discomfort/tightness after some strenuous activity which appears musculoskeletal related; noted worse when he swims with his strokes. No definite component to walking. Can walk and do all his activities of daily living with no issues. Some dyspnea mostly with inclines. No PND/orthopnea. Mitral valve regurgitation-controlled, anxiety-controlled, CP-atypical, BOND-stable, PVCs-not controlled, hypertension-not controlled    Past Medical History, Past Surgical History, Family history, Social History, and Medications were all reviewed with the patient today and updated as necessary.      Allergies   Allergen Reactions    Amoxicillin Other (See Comments)     Upset stomach and diarrhea    Tapentadol Other (See Comments) tablet 11    aspirin 81 MG EC tablet Take 81 mg by mouth      cetirizine (ZYRTEC) 10 MG tablet Take 10 mg by mouth daily as needed      loratadine (CLARITIN) 10 MG tablet Take by mouth daily as needed      omeprazole (PRILOSEC) 40 MG delayed release capsule Take 20 mg by mouth      valACYclovir (VALTREX) 1 g tablet Take 250 mg by mouth daily      Amoxicillin 500 MG TABS Take 4 tablets prior to dental procedure (Patient not taking: Reported on 7/22/2022)       No current facility-administered medications for this visit. Review of Systems   Constitutional: Negative for chills, decreased appetite, fever, malaise/fatigue and weight gain. HENT:  Negative for nosebleeds. Eyes:  Negative for blurred vision and double vision. Cardiovascular:  Positive for chest pain (atypical). Negative for claudication, dyspnea on exertion, leg swelling, orthopnea, palpitations, paroxysmal nocturnal dyspnea and syncope. Respiratory:  Negative for cough, hemoptysis and shortness of breath. Endocrine: Negative for cold intolerance and heat intolerance. Hematologic/Lymphatic: Negative for bleeding problem. Skin:  Negative for rash. Musculoskeletal:  Negative for back pain, joint pain, muscle weakness and myalgias. Gastrointestinal:  Negative for bloating, abdominal pain, nausea and vomiting. Genitourinary:  Negative for dysuria. Neurological:  Negative for dizziness, light-headedness and weakness. Psychiatric/Behavioral:  Negative for altered mental status. PHYSICAL EXAM:    BP (!) 160/98   Pulse 71   Ht 5' 11\" (1.803 m)   Wt 277 lb 1.6 oz (125.7 kg)   BMI 38.65 kg/m²      Physical Exam  Constitutional:       Appearance: Normal appearance. HENT:      Head: Normocephalic and atraumatic. Mouth/Throat:      Mouth: Mucous membranes are moist.   Eyes:      Pupils: Pupils are equal, round, and reactive to light. Cardiovascular:      Rate and Rhythm: Normal rate and regular rhythm.       Pulses: Normal pulses. Pulmonary:      Effort: Pulmonary effort is normal.      Breath sounds: Normal breath sounds. Abdominal:      General: Bowel sounds are normal. There is no distension. Palpations: Abdomen is soft. Tenderness: There is no abdominal tenderness. Musculoskeletal:         General: No swelling. Cervical back: Normal range of motion. Skin:     General: Skin is warm and dry. Neurological:      General: No focal deficit present. Mental Status: He is alert and oriented to person, place, and time. Medical problems and test results were reviewed with the patient today. No results found for this or any previous visit (from the past 672 hour(s)). No results found for: CHOL, CHOLPOCT, CHOLX, CHLST, CHOLV, HDL, HDLPOC, HDLC, LDL, LDLC, VLDLC, VLDL, TGLX, TRIGL    No results found for any visits on 07/22/22. ASSESSMENT and Yung  was seen today for hypertension. Diagnoses and all orders for this visit:    Essential hypertension  -     EKG 12 lead    S/P mitral valve repair    Dyspnea on exertion    Other orders  -     valsartan (DIOVAN) 160 MG tablet; Take 1 tablet by mouth in the morning. Overall Impression    Mitral valve repair-stable. Post op echo with no noted mitral regurgitation and adequate repair. Discussed updated prophylaxis guidelines (7/17) and with prior repair, will need bacterial endocarditis prophylaxis     PVCs-noted on EKG prior and possibly related to electrolyte abnormality while on HCTZ. Currently off pill. Not noted today. Chest pain-no recurrence of prior symptoms atypical.  Appears MS related; no further workup at this time. Dyspnea on exertion-improved. Prior likely related to deconditioning/weight gain     Hypertension-not controlled. Initiate valsartan. Told patient to bring in list of meds tried previously. Other-lipid profile from 2/25/2021 reviewed with LDL at 91.     Return in about 2 months (around 9/22/2022).      Barb Lawrence MD  7/22/2022  10:13 AM

## 2022-08-15 ENCOUNTER — TELEPHONE (OUTPATIENT)
Dept: CARDIOLOGY CLINIC | Age: 57
End: 2022-08-15

## 2022-08-15 NOTE — TELEPHONE ENCOUNTER
Patient states medicine is making him drowsy and sleepy. Patient has only been taking quarter of a pill rather than a whole one. Patient would just like the dosage changed. The medication is valsartan. His blood pressure has been 130/80 lately. Please call and advise.

## 2022-08-15 NOTE — TELEPHONE ENCOUNTER
He was started on Valsartan 160mg qday 7/22 and pt.feels like the med is too strong. Makes him feel slow and dizzy. It has lowered BP to 117/72 he felt this was too low. He cut it back to 80mg a week ago and then cut to 1/3 tab. He wants to know if he can cut back to 40mg qday?

## 2022-08-16 RX ORDER — VALSARTAN 40 MG/1
40 TABLET ORAL DAILY
Qty: 90 TABLET | Refills: 3 | Status: SHIPPED | OUTPATIENT
Start: 2022-08-16 | End: 2022-09-09 | Stop reason: SINTOL

## 2022-08-16 NOTE — TELEPHONE ENCOUNTER
Pt.notified of MD response and v/u. Med escribed as below  Requested Prescriptions     Signed Prescriptions Disp Refills    valsartan (DIOVAN) 40 MG tablet 90 tablet 3     Sig: Take 1 tablet by mouth in the morning.      Authorizing Provider: Jayne Simpson     Ordering User: Ondina Brown

## 2022-08-24 ENCOUNTER — APPOINTMENT (RX ONLY)
Dept: URBAN - METROPOLITAN AREA CLINIC 24 | Facility: CLINIC | Age: 57
Setting detail: DERMATOLOGY
End: 2022-08-24

## 2022-08-24 DIAGNOSIS — D485 NEOPLASM OF UNCERTAIN BEHAVIOR OF SKIN: ICD-10-CM

## 2022-08-24 DIAGNOSIS — L738 OTHER SPECIFIED DISEASES OF HAIR AND HAIR FOLLICLES: ICD-10-CM

## 2022-08-24 DIAGNOSIS — L57.0 ACTINIC KERATOSIS: ICD-10-CM

## 2022-08-24 DIAGNOSIS — L663 OTHER SPECIFIED DISEASES OF HAIR AND HAIR FOLLICLES: ICD-10-CM

## 2022-08-24 DIAGNOSIS — L82.0 INFLAMED SEBORRHEIC KERATOSIS: ICD-10-CM

## 2022-08-24 DIAGNOSIS — L73.9 FOLLICULAR DISORDER, UNSPECIFIED: ICD-10-CM

## 2022-08-24 PROBLEM — L02.222 FURUNCLE OF BACK [ANY PART, EXCEPT BUTTOCK]: Status: ACTIVE | Noted: 2022-08-24

## 2022-08-24 PROBLEM — L02.223 FURUNCLE OF CHEST WALL: Status: ACTIVE | Noted: 2022-08-24

## 2022-08-24 PROBLEM — L55.1 SUNBURN OF SECOND DEGREE: Status: ACTIVE | Noted: 2022-08-24

## 2022-08-24 PROBLEM — D48.5 NEOPLASM OF UNCERTAIN BEHAVIOR OF SKIN: Status: ACTIVE | Noted: 2022-08-24

## 2022-08-24 PROCEDURE — ? BIOPSY BY SHAVE METHOD

## 2022-08-24 PROCEDURE — ? LIQUID NITROGEN

## 2022-08-24 PROCEDURE — ? COUNSELING

## 2022-08-24 PROCEDURE — 17003 DESTRUCT PREMALG LES 2-14: CPT | Mod: 59

## 2022-08-24 PROCEDURE — ? OTHER

## 2022-08-24 PROCEDURE — 17000 DESTRUCT PREMALG LESION: CPT | Mod: 59

## 2022-08-24 PROCEDURE — 11102 TANGNTL BX SKIN SINGLE LES: CPT | Mod: 59

## 2022-08-24 PROCEDURE — 17110 DESTRUCTION B9 LES UP TO 14: CPT

## 2022-08-24 ASSESSMENT — LOCATION ZONE DERM
LOCATION ZONE: ARM
LOCATION ZONE: TRUNK

## 2022-08-24 ASSESSMENT — LOCATION SIMPLE DESCRIPTION DERM
LOCATION SIMPLE: LEFT FOREARM
LOCATION SIMPLE: CHEST
LOCATION SIMPLE: LEFT UPPER BACK
LOCATION SIMPLE: LEFT UPPER ARM

## 2022-08-24 ASSESSMENT — LOCATION DETAILED DESCRIPTION DERM
LOCATION DETAILED: LEFT PROXIMAL DORSAL FOREARM
LOCATION DETAILED: LEFT ANTERIOR LATERAL DISTAL UPPER ARM
LOCATION DETAILED: LEFT DISTAL RADIAL DORSAL FOREARM
LOCATION DETAILED: LEFT MID-UPPER BACK
LOCATION DETAILED: STERNUM

## 2022-08-24 NOTE — PROCEDURE: LIQUID NITROGEN
Number Of Freeze-Thaw Cycles: 1 freeze-thaw cycle
Duration Of Freeze Thaw-Cycle (Seconds): 0
Medical Necessity Clause: This procedure was medically necessary because the lesions that were treated were: rubbed
Render Post-Care Instructions In Note?: yes
Post-Care Instructions: I reviewed with the patient in detail post-care instructions. Patient is to wear sunprotection, and avoid picking at any of the treated lesions. Pt may apply Vaseline to crusted or scabbing areas.
Include Z78.9 (Other Specified Conditions Influencing Health Status) As An Associated Diagnosis?: No
Spray Paint Text: The liquid nitrogen was applied to the skin utilizing a spray paint frosting technique.
Consent: The patient's consent was obtained including but not limited to risks of crusting, scabbing, blistering, scarring, darker or lighter pigmentary change, recurrence, incomplete removal and infection.
Medical Necessity Information: It is in your best interest to select a reason for this procedure from the list below. All of these items fulfill various CMS LCD requirements except the new and changing color options.
Detail Level: Detailed

## 2022-08-24 NOTE — PROCEDURE: BIOPSY BY SHAVE METHOD
Validate Anticipated Plan: No
Biopsy Type: H and E
Wound Care: Vaseline
Information: Selecting Yes will display possible errors in your note based on the variables you have selected. This validation is only offered as a suggestion for you. PLEASE NOTE THAT THE VALIDATION TEXT WILL BE REMOVED WHEN YOU FINALIZE YOUR NOTE. IF YOU WANT TO FAX A PRELIMINARY NOTE YOU WILL NEED TO TOGGLE THIS TO 'NO' IF YOU DO NOT WANT IT IN YOUR FAXED NOTE.
Cryotherapy Text: The wound bed was treated with cryotherapy after the biopsy was performed.
Silver Nitrate Text: The wound bed was treated with silver nitrate after the biopsy was performed.
Notification Instructions: Patient will be notified of biopsy results. However, patient instructed to call the office if not contacted within 2 weeks.
Biopsy Method: scissors
Anesthesia Type: 1% lidocaine without epinephrine and a 1:10 solution of 8.4% sodium bicarbonate
Curettage Text: The wound bed was treated with curettage after the biopsy was performed.
Size Of Lesion In Cm: 0
Billing Type: Third-Party Bill
Depth Of Biopsy: dermis
Hemostasis: Aluminum Chloride
Type Of Destruction Used: Curettage
Post-Care Instructions: I reviewed with the patient in detail post-care instructions. Patient is to keep the biopsy site dry overnight, and then apply vaseline twice daily until healed. Patient may apply hydrogen peroxide soaks to remove any crusting. Patient given a wound care sheet.
Dressing: bandage
Electrodesiccation Text: The wound bed was treated with electrodesiccation after the biopsy was performed.
Render Post-Care Instructions In Note?: yes
Anesthesia Volume In Cc: 0.1
Electrodesiccation And Curettage Text: The wound bed was treated with electrodesiccation and curettage after the biopsy was performed.
Accession #: [id number]
Detail Level: Detailed
Consent: Verbal consent was obtained and risks were reviewed including but not limited to scarring, infection, bleeding, scabbing, incomplete removal, nerve damage and allergy to anesthesia.

## 2022-08-24 NOTE — PROCEDURE: OTHER
Render Risk Assessment In Note?: no
Detail Level: Zone
Note Text (......Xxx Chief Complaint.): This diagnosis correlates with the
Other (Free Text): He still has significant active Folliculitis and he has had biopsies showing pityrosporum. He does have areas that appear bacterial as well and underarms concerning for HS. He has been treated with a variety of oral antibiotics as well as fluconazole and topical keto without significant improvement. \\n\\nHe has not wanted to escalate care. He declines culture today. Continue VaniCream Z Bar alternated w BPO wash in the shower.

## 2022-09-09 ENCOUNTER — TELEPHONE (OUTPATIENT)
Dept: CARDIOLOGY CLINIC | Age: 57
End: 2022-09-09

## 2022-09-09 DIAGNOSIS — I10 ESSENTIAL HYPERTENSION: ICD-10-CM

## 2022-09-09 DIAGNOSIS — Z79.899 LONG-TERM USE OF HIGH-RISK MEDICATION: Primary | ICD-10-CM

## 2022-09-09 RX ORDER — SPIRONOLACTONE 25 MG/1
25 TABLET ORAL DAILY
Qty: 30 TABLET | Refills: 11 | Status: SHIPPED | OUTPATIENT
Start: 2022-09-09 | End: 2022-09-27 | Stop reason: SINTOL

## 2022-09-09 NOTE — TELEPHONE ENCOUNTER
Patient is having some issues with his BP medication Valsartan . Feeling some light headiness , dizziness and very tired.  Please call to advise

## 2022-09-09 NOTE — TELEPHONE ENCOUNTER
Advised patient of Dr. Belle Lopez' response. Advised patient to continue to monitor and record  BP and call with any further questions or concerns. Advised patient to go to any [facility] outpatient  lab for BMP one week after starting spironolactone. Patient verbalized understanding.    Orders Placed This Encounter   Procedures    Basic Metabolic Panel     Standing Status:   Future     Standing Expiration Date:   9/9/2023     Requested Prescriptions     Signed Prescriptions Disp Refills    spironolactone (ALDACTONE) 25 MG tablet 30 tablet 11     Sig: Take 1 tablet by mouth daily     Authorizing Provider: Selene Busch     Ordering User: Antonio Montana     Spironolactone 25 mg qd, as above, E-prescribed to [facility] on [address] in [facility] at patient's request.

## 2022-09-21 DIAGNOSIS — Z79.899 LONG-TERM USE OF HIGH-RISK MEDICATION: ICD-10-CM

## 2022-09-21 DIAGNOSIS — I10 ESSENTIAL HYPERTENSION: ICD-10-CM

## 2022-09-22 LAB
ANION GAP SERPL CALC-SCNC: 5 MMOL/L (ref 4–13)
BUN SERPL-MCNC: 9 MG/DL (ref 6–23)
CALCIUM SERPL-MCNC: 9.6 MG/DL (ref 8.3–10.4)
CHLORIDE SERPL-SCNC: 108 MMOL/L (ref 101–110)
CO2 SERPL-SCNC: 28 MMOL/L (ref 21–32)
CREAT SERPL-MCNC: 1.2 MG/DL (ref 0.8–1.5)
GLUCOSE SERPL-MCNC: 109 MG/DL (ref 65–100)
POTASSIUM SERPL-SCNC: 4.2 MMOL/L (ref 3.5–5.1)
SODIUM SERPL-SCNC: 141 MMOL/L (ref 136–145)

## 2022-09-26 ENCOUNTER — TELEPHONE (OUTPATIENT)
Dept: CARDIOLOGY CLINIC | Age: 57
End: 2022-09-26

## 2022-09-26 NOTE — TELEPHONE ENCOUNTER
Extremely bad aching and pain in all joints with joints \"locking up\" and decreased mobility since he D/C'd valsartan and began spironolactone 9/9/22. Increased right sciatic nerve pain going down into right buttock and down right leg, \"like a lightning strike\", dragging right leg. D/C'd spironolactone and began valsartan 40 mg 1/2 tab qd on 9/24/22. Feeling better with decreased muscle and joint tightness and pain. BP-129/79, 141/81. BMP drawn 9/21/22 normal, except for glucose-109. Patient asks if okay to D/C spironolactone and continue losartan 40 mg 1/2 tab qd and asks for Dr. Eduardo Maravilla' recommendations. Advised patient that I will notify Dr. Eduardo Maravilla of above when he returns to [facility], tomorrow, and call with his response. Patient verbalized understanding.

## 2022-09-27 ENCOUNTER — TELEPHONE (OUTPATIENT)
Dept: CARDIOLOGY CLINIC | Age: 57
End: 2022-09-27

## 2022-09-27 RX ORDER — VALSARTAN 40 MG/1
40 TABLET ORAL DAILY
Qty: 90 TABLET | Refills: 3
Start: 2022-09-27

## 2022-09-27 NOTE — TELEPHONE ENCOUNTER
Advised patient of Dr. Nash Walter' response. Advised patient to keep 10/7/22 11:15 am [place] appointment with Dr. Nash Walter. Patient verbalized understanding.    Requested Prescriptions     Signed Prescriptions Disp Refills    valsartan (DIOVAN) 40 MG tablet 90 tablet 3     Sig: Take 1 tablet by mouth daily     Authorizing Provider: Lisa Morataya     Ordering User: Shereen Grey

## 2022-09-27 NOTE — TELEPHONE ENCOUNTER
Patient states he is having sciatic nerve pain in his right leg. Patient states muscle in hip is in spasm with hard knot in his hip. Patient would like to know if Dr Karen Sanches will prescribe a muscle relaxer. BP seems to be under control.

## 2022-09-28 NOTE — TELEPHONE ENCOUNTER
Advised patient of Dr. Lucretia Morris' response. Patient verbalized understanding, but states he does not currently have PCP, and is scheduled to see new PCP in several weeks. Advised patient to see urgent care for evaluation of pain. Patient verbalized understanding.

## 2022-09-28 NOTE — TELEPHONE ENCOUNTER
Render Hoit, MD Guinevere Bence, RN  Caller: Unspecified Brittany Dior, 12:38 PM)  That needs to be addressed by his PCP.  Not something we deal with

## 2022-10-19 ENCOUNTER — APPOINTMENT (RX ONLY)
Dept: URBAN - METROPOLITAN AREA CLINIC 23 | Facility: CLINIC | Age: 57
Setting detail: DERMATOLOGY
End: 2022-10-19

## 2022-12-08 RX ORDER — VALSARTAN 40 MG/1
40 TABLET ORAL DAILY
Qty: 90 TABLET | Refills: 3 | Status: SHIPPED | OUTPATIENT
Start: 2022-12-08

## 2022-12-16 ENCOUNTER — OFFICE VISIT (OUTPATIENT)
Dept: CARDIOLOGY CLINIC | Age: 57
End: 2022-12-16
Payer: COMMERCIAL

## 2022-12-16 VITALS
WEIGHT: 278 LBS | SYSTOLIC BLOOD PRESSURE: 130 MMHG | BODY MASS INDEX: 38.92 KG/M2 | HEIGHT: 71 IN | DIASTOLIC BLOOD PRESSURE: 78 MMHG | HEART RATE: 70 BPM

## 2022-12-16 DIAGNOSIS — I10 ESSENTIAL HYPERTENSION: Primary | ICD-10-CM

## 2022-12-16 DIAGNOSIS — Z98.890 S/P MITRAL VALVE REPAIR: ICD-10-CM

## 2022-12-16 PROCEDURE — 3078F DIAST BP <80 MM HG: CPT | Performed by: INTERNAL MEDICINE

## 2022-12-16 PROCEDURE — 99214 OFFICE O/P EST MOD 30 MIN: CPT | Performed by: INTERNAL MEDICINE

## 2022-12-16 PROCEDURE — 3075F SYST BP GE 130 - 139MM HG: CPT | Performed by: INTERNAL MEDICINE

## 2022-12-16 ASSESSMENT — ENCOUNTER SYMPTOMS
BLOATING: 0
VOMITING: 0
SHORTNESS OF BREATH: 0
HEMOPTYSIS: 0
DOUBLE VISION: 0
BLURRED VISION: 0
ORTHOPNEA: 0
ABDOMINAL PAIN: 0
BACK PAIN: 0
NAUSEA: 0
COUGH: 0

## 2022-12-16 NOTE — PROGRESS NOTES
[facility] CARDIOLOGY  [address], [address] [facility], [address] [place] [address]  PHONE: [phone]    22    NAME:  Ramana Moreno  : 1965  MRN: [id number]         SUBJECTIVE:   Ramana Moreno is a 56 y.o. male seen for a visit regarding the following:     Chief Complaint   Patient presents with    Follow-up    Hypertension       HPI:      History of severe MR status post mitral valve repair [P2 resection and 32 mm annuloplasty ring; 17]. Noted postop echo with preserved EF and no mitral regurgitation. Preop coronary angiogram with normal coronaries [3/17]. Admission with pancreatitis and atypical chest pain (17). Prior down issues with nephrolithiasis ()     Since last visit, states unable to tolerate spironolactone. Also issues with losartan. Currently on valsartan 40 mg tablet but he splits and takes 20 mg in the morning and 20 mg in the evening and states best he has felt. Otherwise, used to get some nighttime symptoms that would wake him up similar to prior appointment. Prior---Mostly with some issues with elevated BPs. States some intermittent dizziness and 1 night woke up with some transient chest discomfort. Blood with systolics in the 573O to 584E. Was on HCTZ 25 mg daily which was stopped. States can still walk over a mile with no issues. Prior--No new issues since last visit. No chest pain. Can walk over a mile with no sig issues. No BOND. Down about 6 pounds since last visit. Denies any palpitations. Prior---Still with some issues with atypical sharp right-sided chest discomfort/tightness after some strenuous activity which appears musculoskeletal related; noted worse when he swims with his strokes. No definite component to walking. Can walk and do all his activities of daily living with no issues. Some dyspnea mostly with inclines. No PND/orthopnea.      Mitral valve regurgitation-controlled, anxiety-controlled, CP-atypical, BOND-stable, PVCs-not controlled, hypertension-not controlled    Past Medical History, Past Surgical History, Family history, Social History, and Medications were all reviewed with the patient today and updated as necessary.      Allergies   Allergen Reactions    Amoxicillin-Pot Clavulanate      Other reaction(s): Unknown-Unspecified  Other reaction(s): Unknown-Unspecified  Kidney pain  Kidney pain      Other      Other reaction(s): Unknown-Unspecified    Amlodipine Nausea Only    Amoxicillin Other (See Comments)     Upset stomach and diarrhea    Hydrochlorothiazide Nausea Only    Lisinopril Nausea Only    Losartan Potassium Dizziness or Vertigo    Tapentadol Other (See Comments)     Confusion    Codeine Nausea And Vomiting    Hydromorphone Nausea And Vomiting     Patient Active Problem List   Diagnosis    Thyroid nodule    Elevated troponin    Shortness of breath    KIMBERLYN (obstructive sleep apnea)    Obesity, morbid (HCC)    Mitral valve regurgitation    GERD (gastroesophageal reflux disease)    Mitral regurgitation    Pancreatitis    Mitral insufficiency    Right shoulder pain    Atelectasis    Pleural effusion    Chest pain    Essential hypertension    TIA (transient ischemic attack)     Past Medical History:   Diagnosis Date    Acute mitral insufficiency     Difficult intubation     \"anterior larynx?  glide scope and blue bougie guide helpful per Dr Walter Schulz ENT    GERD (gastroesophageal reflux disease)     daily [facility] elevated 4\" and sleeps on 3 pillows    History of kidney stones 1982    History of kidney stones     History of [place] spotted fever 1983    Hypertension     no longer takes lisinopril, only Lasix    Kidney stone     Mitral regurgitation     Obesity (BMI 30-39.9) 04/17/2017    BMI 37    Pancreatic cyst     PONV (postoperative nausea and vomiting)     with sinus sx    Right shoulder pain 4/24/2017    Acute on chronic (old injury)    Sleep apnea     Does not use CPAP    Thyroid nodule      Past Surgical History:   Procedure Laterality Date    CHOLECYSTECTOMY, LAPAROSCOPIC  2009    ERCP      removal of pancreatic cyst-    HEENT      lymphnode removed from L neck    MA CARDIAC SURG PROCEDURE UNLIST      mitral valve repair    SINUS SURGERY PROC UNLISTED  2002    [facility]    cystoto remove kidney stone     Family History   Adopted: Yes     Social History     Tobacco Use    Smoking status: Never    Smokeless tobacco: Never   Substance Use Topics    Alcohol use: No     Alcohol/week: 0.0 standard drinks     Current Outpatient Medications   Medication Sig Dispense Refill    valsartan (DIOVAN) 40 MG tablet Take 1 tablet by mouth daily 90 tablet 3    aspirin 81 MG EC tablet Take 81 mg by mouth      cetirizine (ZYRTEC) 10 MG tablet Take 10 mg by mouth daily as needed      loratadine (CLARITIN) 10 MG tablet Take by mouth daily as needed      omeprazole (PRILOSEC) 40 MG delayed release capsule Take 40 mg by mouth      valACYclovir (VALTREX) 1 g tablet Take 500 mg by mouth daily      Amoxicillin 500 MG TABS Take 4 tablets prior to dental procedure (Patient not taking: No sig reported)       No current facility-administered medications for this visit. Review of Systems   Constitutional: Negative for chills, decreased appetite, fever, malaise/fatigue and weight gain. HENT:  Negative for nosebleeds. Eyes:  Negative for blurred vision and double vision. Cardiovascular:  Positive for chest pain (atypical). Negative for claudication, dyspnea on exertion, leg swelling, orthopnea, palpitations, paroxysmal nocturnal dyspnea and syncope. Respiratory:  Negative for cough, hemoptysis and shortness of breath. Endocrine: Negative for cold intolerance and heat intolerance. Hematologic/Lymphatic: Negative for bleeding problem. Skin:  Negative for rash. Musculoskeletal:  Negative for back pain, joint pain, muscle weakness and myalgias.    Gastrointestinal:  Negative for bloating, abdominal pain, nausea and vomiting. Genitourinary:  Negative for dysuria. Neurological:  Negative for dizziness, light-headedness and weakness. Psychiatric/Behavioral:  Negative for altered mental status. PHYSICAL EXAM:    /78   Pulse 70   Ht 5' 11\" (1.803 m)   Wt 278 lb (126.1 kg)   BMI 38.77 kg/m²      Physical Exam  Constitutional:       Appearance: Normal appearance. HENT:      Head: Normocephalic and atraumatic. Mouth/Throat:      Mouth: Mucous membranes are moist.   Eyes:      Pupils: Pupils are equal, round, and reactive to light. Cardiovascular:      Rate and Rhythm: Normal rate and regular rhythm. Pulses: Normal pulses. Pulmonary:      Effort: Pulmonary effort is normal.      Breath sounds: Normal breath sounds. Abdominal:      General: Bowel sounds are normal. There is no distension. Palpations: Abdomen is soft. Tenderness: There is no abdominal tenderness. Musculoskeletal:         General: No swelling. Cervical back: Normal range of motion. Skin:     General: Skin is warm and dry. Neurological:      General: No focal deficit present. Mental Status: He is alert and oriented to person, place, and time. Medical problems and test results were reviewed with the patient today. No results found for this or any previous visit (from the past 672 hour(s)). No results found for: CHOL, CHOLPOCT, CHOLX, CHLST, CHOLV, HDL, HDLPOC, HDLC, LDL, LDLC, VLDLC, VLDL, TGLX, TRIGL    No results found for any visits on 12/16/22. ASSESSMENT and Fransico Rivera was seen today for follow-up and hypertension. Diagnoses and all orders for this visit:    Essential hypertension    S/P mitral valve repair        Overall Impression    Mitral valve repair-stable. Post op echo with no noted mitral regurgitation and adequate repair.   Discussed updated prophylaxis guidelines (7/17) and with prior repair, will need bacterial endocarditis prophylaxis     PVCs-noted on EKG prior and possibly related to electrolyte abnormality while on HCTZ. Currently off pill. Not noted on last EKG. Chest pain-no recurrence of prior symptoms atypical.  Appears MS related; no further workup at this time. Dyspnea on exertion-improved. Prior likely related to deconditioning/weight gain     Hypertension-not controlled. Okay to plan valsartan 20 mg twice daily. Discussed low-dose. Ideally weight loss and discussed. States if he goes higher than 20 mg daily or valsartan, issues with fatigue/weakness/dizziness. Told patient to bring in list of meds tried previously. Other-lipid profile from 2/25/2021 reviewed with LDL at 91. Return in about 6 months (around 6/16/2023).      Fabienne Anguiano MD  12/16/2022  4:47 PM

## 2023-02-09 ENCOUNTER — APPOINTMENT (RX ONLY)
Dept: URBAN - METROPOLITAN AREA CLINIC 24 | Facility: CLINIC | Age: 58
Setting detail: DERMATOLOGY
End: 2023-02-09

## 2023-02-09 DIAGNOSIS — Z41.9 ENCOUNTER FOR PROCEDURE FOR PURPOSES OTHER THAN REMEDYING HEALTH STATE, UNSPECIFIED: ICD-10-CM

## 2023-02-09 PROCEDURE — ? INVENTORY

## 2023-02-09 PROCEDURE — ? GENTLELASE

## 2023-02-09 ASSESSMENT — LOCATION DETAILED DESCRIPTION DERM
LOCATION DETAILED: RIGHT POSTERIOR NECK
LOCATION DETAILED: LEFT POSTERIOR NECK

## 2023-02-09 ASSESSMENT — LOCATION SIMPLE DESCRIPTION DERM: LOCATION SIMPLE: POSTERIOR NECK

## 2023-02-09 ASSESSMENT — LOCATION ZONE DERM: LOCATION ZONE: NECK

## 2023-02-09 NOTE — PROCEDURE: GENTLELASE
Pulse Duration: 3 ms
Fluence: 11
Endpoint: Seborrheic Keratoses: Immediate endpoint: perilesional erythema and edema. Vaseline and ice applied. Post care reviewed with patient.
Cooling: DCD setting
Spot Size: 20 mm
Consent: Written consent obtained, risks reviewed including but not limited to crusting, scabbing, blistering, scarring, darker or lighter pigmentary change, paradoxical hair regrowth, incomplete removal of hair and infection.
Laser Type: Alexandrite 755nm
Indication: Laser Hair Removal
Post-Care Instructions: I reviewed with the patient in detail post-care instructions. Patient should avoid sun for a minimum of 4 weeks before and after treatment.
Endpoint: Laser Hair Removal: Immediate endpoint: perifollicular erythema and edema. Vaseline and ice applied. Post care reviewed with patient.\\n\\n
Detail Level: Detailed
External Cooling Fan Speed: 0

## 2023-06-14 ENCOUNTER — APPOINTMENT (RX ONLY)
Dept: URBAN - METROPOLITAN AREA CLINIC 24 | Facility: CLINIC | Age: 58
Setting detail: DERMATOLOGY
End: 2023-06-14

## 2023-06-14 DIAGNOSIS — Z41.9 ENCOUNTER FOR PROCEDURE FOR PURPOSES OTHER THAN REMEDYING HEALTH STATE, UNSPECIFIED: ICD-10-CM

## 2023-06-14 PROCEDURE — ? GENTLELASE

## 2023-06-14 PROCEDURE — ? INVENTORY

## 2023-06-14 ASSESSMENT — LOCATION SIMPLE DESCRIPTION DERM
LOCATION SIMPLE: LEFT UPPER BACK
LOCATION SIMPLE: RIGHT LOWER BACK

## 2023-06-14 ASSESSMENT — LOCATION DETAILED DESCRIPTION DERM
LOCATION DETAILED: LEFT SUPERIOR UPPER BACK
LOCATION DETAILED: RIGHT INFERIOR LATERAL MIDBACK

## 2023-06-14 ASSESSMENT — LOCATION ZONE DERM: LOCATION ZONE: TRUNK

## 2023-06-14 NOTE — PROCEDURE: GENTLELASE
Endpoint: Lentigines: Immediate endpoint: perilesional erythema and edema. Vaseline and ice applied. Post care reviewed with patient.
Post-Care Instructions: I reviewed with the patient in detail post-care instructions. Patient should avoid sun for a minimum of 4 weeks before and after treatment.
Endpoint: Laser Hair Removal: Immediate endpoint: perifollicular erythema and edema. Vaseline and ice applied. Post care reviewed with patient.
Consent: Written consent obtained, risks reviewed including but not limited to crusting, scabbing, blistering, scarring, darker or lighter pigmentary change, paradoxical hair regrowth, incomplete removal of hair and infection.
Detail Level: Detailed
External Cooling Fan Speed: 0
Cooling: DCD setting
Pulse Duration: 3 ms
Indication: Laser Hair Removal
Fluence: 5
Spot Size: 20 mm
Laser Type: Alexandrite 755nm

## 2023-07-17 ENCOUNTER — APPOINTMENT (RX ONLY)
Dept: URBAN - METROPOLITAN AREA CLINIC 24 | Facility: CLINIC | Age: 58
Setting detail: DERMATOLOGY
End: 2023-07-17

## 2023-07-17 ENCOUNTER — RX ONLY (OUTPATIENT)
Age: 58
Setting detail: RX ONLY
End: 2023-07-17

## 2023-07-17 DIAGNOSIS — L57.8 OTHER SKIN CHANGES DUE TO CHRONIC EXPOSURE TO NONIONIZING RADIATION: ICD-10-CM

## 2023-07-17 DIAGNOSIS — D485 NEOPLASM OF UNCERTAIN BEHAVIOR OF SKIN: ICD-10-CM

## 2023-07-17 DIAGNOSIS — L57.0 ACTINIC KERATOSIS: ICD-10-CM

## 2023-07-17 DIAGNOSIS — L73.9 FOLLICULAR DISORDER, UNSPECIFIED: ICD-10-CM

## 2023-07-17 DIAGNOSIS — L82.1 OTHER SEBORRHEIC KERATOSIS: ICD-10-CM

## 2023-07-17 DIAGNOSIS — L82.0 INFLAMED SEBORRHEIC KERATOSIS: ICD-10-CM

## 2023-07-17 DIAGNOSIS — L738 OTHER SPECIFIED DISEASES OF HAIR AND HAIR FOLLICLES: ICD-10-CM

## 2023-07-17 DIAGNOSIS — Z71.89 OTHER SPECIFIED COUNSELING: ICD-10-CM

## 2023-07-17 DIAGNOSIS — L663 OTHER SPECIFIED DISEASES OF HAIR AND HAIR FOLLICLES: ICD-10-CM

## 2023-07-17 PROBLEM — L02.424 FURUNCLE OF LEFT UPPER LIMB: Status: ACTIVE | Noted: 2023-07-17

## 2023-07-17 PROBLEM — L02.423 FURUNCLE OF RIGHT UPPER LIMB: Status: ACTIVE | Noted: 2023-07-17

## 2023-07-17 PROBLEM — D48.5 NEOPLASM OF UNCERTAIN BEHAVIOR OF SKIN: Status: ACTIVE | Noted: 2023-07-17

## 2023-07-17 PROBLEM — L02.223 FURUNCLE OF CHEST WALL: Status: ACTIVE | Noted: 2023-07-17

## 2023-07-17 PROCEDURE — ? OTHER

## 2023-07-17 PROCEDURE — ? TREATMENT REGIMEN

## 2023-07-17 PROCEDURE — 99214 OFFICE O/P EST MOD 30 MIN: CPT | Mod: 25

## 2023-07-17 PROCEDURE — 17003 DESTRUCT PREMALG LES 2-14: CPT | Mod: 59

## 2023-07-17 PROCEDURE — 17000 DESTRUCT PREMALG LESION: CPT | Mod: 59

## 2023-07-17 PROCEDURE — ? LIQUID NITROGEN

## 2023-07-17 PROCEDURE — ? COUNSELING

## 2023-07-17 PROCEDURE — 17110 DESTRUCTION B9 LES UP TO 14: CPT

## 2023-07-17 PROCEDURE — ? PRESCRIPTION

## 2023-07-17 PROCEDURE — 11102 TANGNTL BX SKIN SINGLE LES: CPT | Mod: 59

## 2023-07-17 PROCEDURE — ? BIOPSY BY SHAVE METHOD

## 2023-07-17 RX ORDER — DOXYCYCLINE HYCLATE 100 MG/1
TABLET, COATED ORAL
Qty: 28 | Refills: 0 | Status: CANCELLED | COMMUNITY
Start: 2023-07-17

## 2023-07-17 RX ORDER — DOXYCYCLINE HYCLATE 100 MG/1
TABLET, COATED ORAL
Qty: 28 | Refills: 0 | Status: ERX | COMMUNITY
Start: 2023-07-17

## 2023-07-17 RX ADMIN — DOXYCYCLINE HYCLATE: 100 TABLET, COATED ORAL at 00:00

## 2023-07-17 ASSESSMENT — LOCATION DETAILED DESCRIPTION DERM
LOCATION DETAILED: RIGHT ANTERIOR PROXIMAL UPPER ARM
LOCATION DETAILED: LEFT SUPERIOR LATERAL NECK
LOCATION DETAILED: LEFT SUPERIOR FOREHEAD
LOCATION DETAILED: RIGHT MEDIAL INFERIOR CHEST
LOCATION DETAILED: RIGHT POSTERIOR SHOULDER
LOCATION DETAILED: LEFT ANTERIOR PROXIMAL UPPER ARM
LOCATION DETAILED: STERNUM
LOCATION DETAILED: LEFT POSTERIOR SHOULDER
LOCATION DETAILED: LEFT LATERAL FOREHEAD
LOCATION DETAILED: RIGHT LATERAL SHOULDER
LOCATION DETAILED: LEFT INFERIOR MEDIAL MALAR CHEEK
LOCATION DETAILED: LEFT DISTAL DORSAL FOREARM
LOCATION DETAILED: LEFT INFERIOR MEDIAL MIDBACK
LOCATION DETAILED: LEFT ULNAR DORSAL HAND

## 2023-07-17 ASSESSMENT — LOCATION ZONE DERM
LOCATION ZONE: ARM
LOCATION ZONE: NECK
LOCATION ZONE: HAND
LOCATION ZONE: FACE
LOCATION ZONE: TRUNK

## 2023-07-17 ASSESSMENT — LOCATION SIMPLE DESCRIPTION DERM
LOCATION SIMPLE: NECK
LOCATION SIMPLE: LEFT UPPER ARM
LOCATION SIMPLE: LEFT FOREHEAD
LOCATION SIMPLE: LEFT CHEEK
LOCATION SIMPLE: LEFT FOREARM
LOCATION SIMPLE: RIGHT UPPER ARM
LOCATION SIMPLE: RIGHT SHOULDER
LOCATION SIMPLE: CHEST
LOCATION SIMPLE: LEFT HAND
LOCATION SIMPLE: LEFT SHOULDER
LOCATION SIMPLE: LEFT LOWER BACK

## 2023-07-17 NOTE — PROCEDURE: TREATMENT REGIMEN
Initiate Treatment: Doxycycline 100mg twice a day x14 days\\nGetting out of sweaty clothing asap
Detail Level: Zone
Continue Regimen: Clindamycin solution

## 2023-07-17 NOTE — PROCEDURE: BIOPSY BY SHAVE METHOD
Detail Level: Detailed
Depth Of Biopsy: dermis
Was A Bandage Applied: Yes
Size Of Lesion In Cm: 0
Biopsy Type: H and E
Biopsy Method: Dermablade
Anesthesia Type: 1% lidocaine with epinephrine
Anesthesia Volume In Cc: 0.5
Hemostasis: Drysol
Wound Care: Petrolatum
Dressing: bandage
Destruction After The Procedure: No
Type Of Destruction Used: Curettage
Curettage Text: The wound bed was treated with curettage after the biopsy was performed.
Cryotherapy Text: The wound bed was treated with cryotherapy after the biopsy was performed.
Electrodesiccation Text: The wound bed was treated with electrodesiccation after the biopsy was performed.
Electrodesiccation And Curettage Text: The wound bed was treated with electrodesiccation and curettage after the biopsy was performed.
Silver Nitrate Text: The wound bed was treated with silver nitrate after the biopsy was performed.
Lab: [id number]
Lab Facility: [id number]
Consent: Written consent was obtained and risks were reviewed including but not limited to scarring, infection, bleeding, scabbing, incomplete removal, nerve damage and allergy to anesthesia.
Post-Care Instructions: I reviewed with the patient in detail post-care instructions. Patient is to keep the biopsy site dry overnight, and then apply bacitracin twice daily until healed. Patient may apply hydrogen peroxide soaks to remove any crusting.
Notification Instructions: Patient will be notified of biopsy results. However, patient instructed to call the office if not contacted within 2 weeks.
Billing Type: Third-Party Bill
Information: Selecting Yes will display possible errors in your note based on the variables you have selected. This validation is only offered as a suggestion for you. PLEASE NOTE THAT THE VALIDATION TEXT WILL BE REMOVED WHEN YOU FINALIZE YOUR NOTE. IF YOU WANT TO FAX A PRELIMINARY NOTE YOU WILL NEED TO TOGGLE THIS TO 'NO' IF YOU DO NOT WANT IT IN YOUR FAXED NOTE.

## 2023-07-17 NOTE — PROCEDURE: LIQUID NITROGEN
Show Topical Anesthesia Variable?: Yes
Include Z78.9 (Other Specified Conditions Influencing Health Status) As An Associated Diagnosis?: No
Detail Level: Detailed
Consent: The patient's consent was obtained including but not limited to risks of crusting, scabbing, blistering, scarring, darker or lighter pigmentary change, recurrence, incomplete removal and infection.
Medical Necessity Information: It is in your best interest to select a reason for this procedure from the list below. All of these items fulfill various CMS LCD requirements except the new and changing color options.
Medical Necessity Clause: This procedure was medically necessary because the lesions that were treated were:
Post-Care Instructions: I reviewed with the patient in detail post-care instructions. Patient is to wear sunprotection, and avoid picking at any of the treated lesions. Pt may apply Vaseline to crusted or scabbing areas.
Spray Paint Text: The liquid nitrogen was applied to the skin utilizing a spray paint frosting technique.
Number Of Freeze-Thaw Cycles: 1 freeze-thaw cycle
Duration Of Freeze Thaw-Cycle (Seconds): 5

## 2023-07-17 NOTE — PROCEDURE: OTHER
Detail Level: Zone
Render Risk Assessment In Note?: no
Other (Free Text): Long-standing but works in hot environment and sweats a lot\\n\\nHas never been cutiturrd but has responded to:doxy before
Note Text (......Xxx Chief Complaint.): This diagnosis correlates with the

## 2023-09-18 ENCOUNTER — OFFICE VISIT (OUTPATIENT)
Age: 58
End: 2023-09-18
Payer: COMMERCIAL

## 2023-09-18 VITALS
WEIGHT: 268 LBS | BODY MASS INDEX: 37.52 KG/M2 | HEART RATE: 77 BPM | SYSTOLIC BLOOD PRESSURE: 130 MMHG | DIASTOLIC BLOOD PRESSURE: 88 MMHG | HEIGHT: 71 IN

## 2023-09-18 DIAGNOSIS — I10 ESSENTIAL HYPERTENSION: ICD-10-CM

## 2023-09-18 DIAGNOSIS — Z98.890 HX OF MITRAL VALVE REPAIR: Primary | ICD-10-CM

## 2023-09-18 PROCEDURE — 3079F DIAST BP 80-89 MM HG: CPT | Performed by: INTERNAL MEDICINE

## 2023-09-18 PROCEDURE — 3075F SYST BP GE 130 - 139MM HG: CPT | Performed by: INTERNAL MEDICINE

## 2023-09-18 PROCEDURE — 93000 ELECTROCARDIOGRAM COMPLETE: CPT | Performed by: INTERNAL MEDICINE

## 2023-09-18 PROCEDURE — 99214 OFFICE O/P EST MOD 30 MIN: CPT | Performed by: INTERNAL MEDICINE

## 2023-09-18 RX ORDER — VALSARTAN 40 MG/1
40 TABLET ORAL DAILY
Qty: 90 TABLET | Refills: 3 | Status: SHIPPED | OUTPATIENT
Start: 2023-09-18

## 2023-09-18 ASSESSMENT — ENCOUNTER SYMPTOMS
BLOATING: 0
ORTHOPNEA: 0
NAUSEA: 0
VOMITING: 0
DOUBLE VISION: 0
SHORTNESS OF BREATH: 0
HEMOPTYSIS: 0
COUGH: 0
BACK PAIN: 0
ABDOMINAL PAIN: 0
BLURRED VISION: 0

## 2023-10-18 ENCOUNTER — APPOINTMENT (RX ONLY)
Dept: URBAN - METROPOLITAN AREA CLINIC 24 | Facility: CLINIC | Age: 58
Setting detail: DERMATOLOGY
End: 2023-10-18

## 2023-10-18 DIAGNOSIS — Z41.9 ENCOUNTER FOR PROCEDURE FOR PURPOSES OTHER THAN REMEDYING HEALTH STATE, UNSPECIFIED: ICD-10-CM

## 2023-10-18 PROCEDURE — ? INVENTORY

## 2023-10-18 PROCEDURE — ? GENTLELASE

## 2023-10-18 ASSESSMENT — LOCATION DETAILED DESCRIPTION DERM
LOCATION DETAILED: MID POSTERIOR NECK
LOCATION DETAILED: LEFT SUPERIOR CRUS OF ANTIHELIX
LOCATION DETAILED: LEFT SUPERIOR UPPER BACK
LOCATION DETAILED: RIGHT INFERIOR LATERAL UPPER BACK

## 2023-10-18 ASSESSMENT — LOCATION SIMPLE DESCRIPTION DERM
LOCATION SIMPLE: POSTERIOR NECK
LOCATION SIMPLE: LEFT UPPER BACK
LOCATION SIMPLE: RIGHT UPPER BACK
LOCATION SIMPLE: LEFT EAR

## 2023-10-18 ASSESSMENT — LOCATION ZONE DERM
LOCATION ZONE: EAR
LOCATION ZONE: NECK
LOCATION ZONE: TRUNK

## 2023-10-18 NOTE — PROCEDURE: GENTLELASE
Detail Level: Detailed
Indication: Laser Hair Removal
Endpoint: Seborrheic Keratoses: Immediate endpoint: perilesional erythema and edema. Vaseline and ice applied. Post care reviewed with patient.
Spot Size: 20 mm
Laser Type: Alexandrite 755nm
Cooling: DCD setting
External Cooling Fan Speed: 0
Pulse Duration: 3 ms
Endpoint: Laser Hair Removal: Immediate endpoint: perifollicular erythema and edema. Vaseline and ice applied. Post care reviewed with patient.
Post-Care Instructions: I reviewed with the patient in detail post-care instructions. Patient should avoid sun for a minimum of 4 weeks before and after treatment.
Fluence: 12
Consent: Written consent obtained, risks reviewed including but not limited to crusting, scabbing, blistering, scarring, darker or lighter pigmentary change, paradoxical hair regrowth, incomplete removal of hair and infection.

## 2023-12-11 RX ORDER — VALSARTAN 40 MG/1
40 TABLET ORAL DAILY
Qty: 90 TABLET | Refills: 3 | Status: SHIPPED | OUTPATIENT
Start: 2023-12-11

## 2023-12-11 NOTE — TELEPHONE ENCOUNTER
MEDICATION REFILL REQUEST      Name of Medication:  valsartan   Dose:  40 mg  Frequency:  daily   Quantity:    Days' supply:  90      Pharmacy Name/Location:  [facility]

## 2024-01-15 ENCOUNTER — APPOINTMENT (RX ONLY)
Dept: URBAN - METROPOLITAN AREA CLINIC 24 | Facility: CLINIC | Age: 59
Setting detail: DERMATOLOGY
End: 2024-01-15

## 2024-01-15 DIAGNOSIS — L82.0 INFLAMED SEBORRHEIC KERATOSIS: ICD-10-CM

## 2024-01-15 DIAGNOSIS — L82.1 OTHER SEBORRHEIC KERATOSIS: ICD-10-CM

## 2024-01-15 DIAGNOSIS — L738 OTHER SPECIFIED DISEASES OF HAIR AND HAIR FOLLICLES: ICD-10-CM

## 2024-01-15 DIAGNOSIS — L20.89 OTHER ATOPIC DERMATITIS: ICD-10-CM

## 2024-01-15 DIAGNOSIS — D485 NEOPLASM OF UNCERTAIN BEHAVIOR OF SKIN: ICD-10-CM

## 2024-01-15 DIAGNOSIS — L73.9 FOLLICULAR DISORDER, UNSPECIFIED: ICD-10-CM

## 2024-01-15 DIAGNOSIS — L663 OTHER SPECIFIED DISEASES OF HAIR AND HAIR FOLLICLES: ICD-10-CM

## 2024-01-15 PROBLEM — L02.424 FURUNCLE OF LEFT UPPER LIMB: Status: ACTIVE | Noted: 2024-01-15

## 2024-01-15 PROBLEM — D48.5 NEOPLASM OF UNCERTAIN BEHAVIOR OF SKIN: Status: ACTIVE | Noted: 2024-01-15

## 2024-01-15 PROBLEM — L02.423 FURUNCLE OF RIGHT UPPER LIMB: Status: ACTIVE | Noted: 2024-01-15

## 2024-01-15 PROBLEM — L02.223 FURUNCLE OF CHEST WALL: Status: ACTIVE | Noted: 2024-01-15

## 2024-01-15 PROCEDURE — ? COUNSELING

## 2024-01-15 PROCEDURE — 17110 DESTRUCTION B9 LES UP TO 14: CPT

## 2024-01-15 PROCEDURE — ? LIQUID NITROGEN

## 2024-01-15 PROCEDURE — ? PRESCRIPTION

## 2024-01-15 PROCEDURE — 99214 OFFICE O/P EST MOD 30 MIN: CPT | Mod: 25

## 2024-01-15 PROCEDURE — 11102 TANGNTL BX SKIN SINGLE LES: CPT | Mod: 59

## 2024-01-15 PROCEDURE — ? BIOPSY BY SHAVE METHOD

## 2024-01-15 PROCEDURE — ? PRESCRIPTION MEDICATION MANAGEMENT

## 2024-01-15 RX ORDER — TRIAMCINOLONE ACETONIDE 1 MG/G
CREAM TOPICAL
Qty: 454 | Refills: 3 | Status: CANCELLED | COMMUNITY
Start: 2024-01-15

## 2024-01-15 RX ADMIN — TRIAMCINOLONE ACETONIDE: 1 CREAM TOPICAL at 00:00

## 2024-01-15 ASSESSMENT — LOCATION DETAILED DESCRIPTION DERM
LOCATION DETAILED: RIGHT DISTAL DORSAL FOREARM
LOCATION DETAILED: RIGHT MEDIAL INFERIOR CHEST
LOCATION DETAILED: LEFT INFERIOR MEDIAL UPPER BACK
LOCATION DETAILED: LEFT INFERIOR MEDIAL MIDBACK
LOCATION DETAILED: RIGHT ANTERIOR LATERAL PROXIMAL THIGH
LOCATION DETAILED: RIGHT ANTERIOR PROXIMAL UPPER ARM
LOCATION DETAILED: LEFT ANTERIOR PROXIMAL UPPER ARM
LOCATION DETAILED: RIGHT SUPERIOR MEDIAL UPPER BACK
LOCATION DETAILED: LEFT INFERIOR UPPER BACK
LOCATION DETAILED: LEFT INFERIOR LATERAL UPPER BACK
LOCATION DETAILED: LEFT ANTERIOR LATERAL PROXIMAL THIGH

## 2024-01-15 ASSESSMENT — LOCATION SIMPLE DESCRIPTION DERM
LOCATION SIMPLE: RIGHT FOREARM
LOCATION SIMPLE: RIGHT THIGH
LOCATION SIMPLE: RIGHT UPPER BACK
LOCATION SIMPLE: LEFT UPPER BACK
LOCATION SIMPLE: LEFT THIGH
LOCATION SIMPLE: LEFT UPPER ARM
LOCATION SIMPLE: LEFT LOWER BACK
LOCATION SIMPLE: RIGHT UPPER ARM
LOCATION SIMPLE: CHEST

## 2024-01-15 ASSESSMENT — LOCATION ZONE DERM
LOCATION ZONE: LEG
LOCATION ZONE: TRUNK
LOCATION ZONE: ARM

## 2024-01-15 NOTE — PROCEDURE: LIQUID NITROGEN
Show Spray Paint Technique Variable?: Yes
Post-Care Instructions: I reviewed with the patient in detail post-care instructions. Patient is to wear sunprotection, and avoid picking at any of the treated lesions. Pt may apply Vaseline to crusted or scabbing areas.
Spray Paint Text: The liquid nitrogen was applied to the skin utilizing a spray paint frosting technique.
Render Post-Care Instructions In Note?: no
Medical Necessity Clause: This procedure was medically necessary because the lesions that were treated were:
Consent: The patient's consent was obtained including but not limited to risks of crusting, scabbing, blistering, scarring, darker or lighter pigmentary change, recurrence, incomplete removal and infection.
Medical Necessity Information: It is in your best interest to select a reason for this procedure from the list below. All of these items fulfill various CMS LCD requirements except the new and changing color options.
Detail Level: Detailed

## 2024-01-15 NOTE — PROCEDURE: BIOPSY BY SHAVE METHOD
Detail Level: Detailed
Depth Of Biopsy: dermis
Was A Bandage Applied: Yes
Size Of Lesion In Cm: 0
Biopsy Type: H and E
Biopsy Method: Dermablade
Anesthesia Type: 1% lidocaine with epinephrine
Anesthesia Volume In Cc: 0.5
Hemostasis: Drysol
Wound Care: Petrolatum
Dressing: bandage
Destruction After The Procedure: No
Type Of Destruction Used: Curettage
Curettage Text: The wound bed was treated with curettage after the biopsy was performed.
Cryotherapy Text: The wound bed was treated with cryotherapy after the biopsy was performed.
Electrodesiccation Text: The wound bed was treated with electrodesiccation after the biopsy was performed.
Electrodesiccation And Curettage Text: The wound bed was treated with electrodesiccation and curettage after the biopsy was performed.
Silver Nitrate Text: The wound bed was treated with silver nitrate after the biopsy was performed.
Lab: [id number]
Lab Facility: [id number]
Consent: Written consent was obtained and risks were reviewed including but not limited to scarring, infection, bleeding, scabbing, incomplete removal, nerve damage and allergy to anesthesia.
Post-Care Instructions: I reviewed with the patient in detail post-care instructions. Patient is to keep the biopsy site dry overnight, and then apply bacitracin twice daily until healed. Patient may apply hydrogen peroxide soaks to remove any crusting.
Notification Instructions: Patient will be notified of biopsy results. However, patient instructed to call the office if not contacted within 2 weeks.
Billing Type: Third-Party Bill
Information: Selecting Yes will display possible errors in your note based on the variables you have selected. This validation is only offered as a suggestion for you. PLEASE NOTE THAT THE VALIDATION TEXT WILL BE REMOVED WHEN YOU FINALIZE YOUR NOTE. IF YOU WANT TO FAX A PRELIMINARY NOTE YOU WILL NEED TO TOGGLE THIS TO 'NO' IF YOU DO NOT WANT IT IN YOUR FAXED NOTE.

## 2024-01-18 ENCOUNTER — RX ONLY (OUTPATIENT)
Age: 59
Setting detail: RX ONLY
End: 2024-01-18

## 2024-01-18 RX ORDER — TRIAMCINOLONE ACETONIDE 1 MG/G
CREAM TOPICAL
Qty: 454 | Refills: 3 | Status: ERX

## 2024-01-22 ENCOUNTER — TELEPHONE (OUTPATIENT)
Age: 59
End: 2024-01-22

## 2024-01-23 NOTE — TELEPHONE ENCOUNTER
Per Dr. Padilla, \"Looks good.  Nothing of concern.  No significant change from prior study\".    Pt informed of results per Dr. Dueñas. Pt thanked and voiced understanding.

## 2024-03-07 ENCOUNTER — OFFICE VISIT (OUTPATIENT)
Dept: ENT CLINIC | Age: 59
End: 2024-03-07
Payer: COMMERCIAL

## 2024-03-07 VITALS
HEIGHT: 69 IN | DIASTOLIC BLOOD PRESSURE: 84 MMHG | WEIGHT: 271 LBS | BODY MASS INDEX: 40.14 KG/M2 | SYSTOLIC BLOOD PRESSURE: 136 MMHG | RESPIRATION RATE: 18 BRPM

## 2024-03-07 DIAGNOSIS — E07.9 THYROID MASS: ICD-10-CM

## 2024-03-07 DIAGNOSIS — R42 VERTIGO: ICD-10-CM

## 2024-03-07 DIAGNOSIS — Z98.890 S/P FESS (FUNCTIONAL ENDOSCOPIC SINUS SURGERY): ICD-10-CM

## 2024-03-07 DIAGNOSIS — J32.9 CHRONIC RHINOSINUSITIS: Primary | ICD-10-CM

## 2024-03-07 PROCEDURE — 31575 DIAGNOSTIC LARYNGOSCOPY: CPT | Performed by: STUDENT IN AN ORGANIZED HEALTH CARE EDUCATION/TRAINING PROGRAM

## 2024-03-07 PROCEDURE — 99204 OFFICE O/P NEW MOD 45 MIN: CPT | Performed by: STUDENT IN AN ORGANIZED HEALTH CARE EDUCATION/TRAINING PROGRAM

## 2024-03-07 PROCEDURE — 3075F SYST BP GE 130 - 139MM HG: CPT | Performed by: STUDENT IN AN ORGANIZED HEALTH CARE EDUCATION/TRAINING PROGRAM

## 2024-03-07 PROCEDURE — 3079F DIAST BP 80-89 MM HG: CPT | Performed by: STUDENT IN AN ORGANIZED HEALTH CARE EDUCATION/TRAINING PROGRAM

## 2024-03-07 RX ORDER — FAMOTIDINE 20 MG/1
20 TABLET, FILM COATED ORAL DAILY
COMMUNITY
Start: 2021-09-04

## 2024-03-07 RX ORDER — CEFDINIR 300 MG/1
300 CAPSULE ORAL 2 TIMES DAILY
COMMUNITY
Start: 2024-03-06 | End: 2024-03-16

## 2024-03-07 RX ORDER — TAMSULOSIN HYDROCHLORIDE 0.4 MG/1
0.4 CAPSULE ORAL DAILY
COMMUNITY
Start: 2021-10-26 | End: 2024-12-27

## 2024-03-07 RX ORDER — MECLIZINE HYDROCHLORIDE 25 MG/1
25-50 TABLET ORAL 3 TIMES DAILY PRN
COMMUNITY
Start: 2024-02-15

## 2024-03-07 RX ORDER — SILDENAFIL 50 MG/1
50 TABLET, FILM COATED ORAL PRN
COMMUNITY
Start: 2024-02-20

## 2024-03-07 ASSESSMENT — ENCOUNTER SYMPTOMS
WHEEZING: 0
CHOKING: 0
FACIAL SWELLING: 0
APNEA: 0
NAUSEA: 0
DIARRHEA: 0
COUGH: 0
STRIDOR: 0
EYE ITCHING: 0
SHORTNESS OF BREATH: 0
SINUS PRESSURE: 1
CONSTIPATION: 0
EYE PAIN: 0
SINUS PAIN: 1
EYE DISCHARGE: 0

## 2024-03-07 NOTE — PROGRESS NOTES
3.85 to 0.6 cm.    The isthmus measures 4.0 mm.    Heterogeneous echoarchitecture of the thyroid gland. Interval increase in  size of the palpable lower pole left thyroid lobe nodule. This measures 5.0  x 3.3 x 4.8 cm and is heterogeneous and mostly solid with  microcalcifications; TR5  IMPRESSION: 5 cm TR5 nodule lower pole left thyroid lobe demonstrating  interval enlargement.    Reference: ACR Thyroid Imaging, Reporting, and Data System (TI-RADS): White  Paper of the ACR TI-RADS Committee, J Am Zonia Radiol 2017.         PROCEDURE: Flexible nasolaryngoscopy  INDICATIONS: Chronic sinusitis  DESCRIPTION: After verbal consent was obtained and a timeout was performed, the flexible scope was advanced down one of the patient's nares into the nasopharynx. The nasal cavity and nasopharynx were clear.  Patent right-sided maxillary antrostomy and ethmoidectomy surgical sinus cavities with mucopurulent discharge and inflammatory mucous membranes.  No polyps.  On the left side there is a lateralized large middle turbinate obstructing the left middle meatus.  The scope was then turned distally down towards the oropharynx. The BOT and vallecula were clear and the epiglottis was normal in appearance. Both aryepiglottic folds were clear and there was a normal appearing glottis w/ healthy TVCs. No nodules or polyps and the cords were fully mobile. Airway widely patent. No concerning lesions seen along post-cricoid region or within either piriform sinus. The posterior pharyngeal was clear as well. The scope was then carefully removed. The patient tolerated the procedure well and there were no complications.      ASSESSMENT and PLAN        ICD-10-CM    1. Chronic rhinosinusitis  J32.9 LARYNGOSCOPY,FLEX FIBER,DIAGNOSTIC      2. S/P FESS (functional endoscopic sinus surgery)  Z98.890       3. Thyroid mass  E07.9 BS - Thyroid Nodule, [facility]      4. Vertigo  R42           Evidence of ongoing mucopurulent acute on

## 2024-03-08 ENCOUNTER — OFFICE VISIT (OUTPATIENT)
Dept: ENDOCRINOLOGY | Age: 59
End: 2024-03-08

## 2024-03-08 VITALS — DIASTOLIC BLOOD PRESSURE: 90 MMHG | WEIGHT: 281 LBS | BODY MASS INDEX: 41.47 KG/M2 | SYSTOLIC BLOOD PRESSURE: 130 MMHG

## 2024-03-08 DIAGNOSIS — E04.1 THYROID NODULE: Primary | ICD-10-CM

## 2024-03-08 ASSESSMENT — ENCOUNTER SYMPTOMS
VOICE CHANGE: 1
TROUBLE SWALLOWING: 1

## 2024-03-08 NOTE — PROGRESS NOTES
DENIS Mccormick MD, [facility] ENDOCRINOLOGY   AND   THYROID NODULE CLINIC            Reason for visit: Quique Tipton is referred by Earnest Velasco DO (otolaryngology) for the evaluation and management of a thyroid mass.        ASSESSMENT AND PLAN:    1. Thyroid nodule  Thank you for referring Quique Tipton to the THYROID NODULE CLINIC. I had a lengthy discussion with the patient regarding my general approach to thyroid nodules. The patient was told that, though most thyroid nodules are benign, this can only be determined with pathologic evaluation of nodule tissue. Fine needle aspiration biopsy is the preferred method of performing cytopathologic evaluation. However, FNABx occasionally results in inconclusive results which may result in a recommendation of further testing, including repeat FNABx or thyroid surgery. Additionally, FNABx does carry a small risk of false negative results. FNABx provides information about the nodule biopsied and does not provide any information about potential cancer in other parts of the thyroid. The patient is also offered consultation with a thyroid surgeon before or after FNABx if lingering concern or worry persists. After careful consideration, the patient agrees to proceed with FNABx; this was performed today. I will call the patient with today's pathology results. If negative for malignancy, I will plan to repeat thyroid ultrasound in approximately 12 months.  - FINE NEEDLE ASPIRATION BX W/US GDN 1ST LESION        PROCEDURES:    FINE NEEDLE ASPIRATION BIOPSY OF LEFT LOBE THYROID NODULE  Consent was obtained; risks and benefits were explained, including but not limited to bruising and bleeding. A pre-procedure time-out was done.  Neck was cleansed with alcohol. 1% lidocaine local anesthesia was administered. The thyroid nodule was visualized with ultrasound using a \"hockey stick\" probe. Using sterile gel and probe cover, five passes with 27G needles

## 2024-03-08 NOTE — PATIENT INSTRUCTIONS
THYROID FINE NEEDLE BIOPSY AFTER-CARE INSTRUCTIONS    WHAT YOU SHOULD KNOW:  Fine needle aspiration biopsy is a simple in-office procedure to remove microscopic amounts of tissue from nodules/lumps in the thyroid gland.  This tissue will be sent to an expert thyroid pathologist for evaluation.  AFTER YOU LEAVE:  A small amount of bruising, swelling, and tenderness after the biopsy is normal and expected.  You may use cold compresses for relief of symptoms.  You may also use ibuprofen (Motrin) or acetaminophen (Tylenol) for relief of tenderness.  Notify our office if you experience any of the following:  Persistent pain/discomfort at site of biopsy  Swollen lump at site of biopsy  Difficulty swallowing  WHAT WILL HAPPEN NEXT:  The specimens from our office will be sent to an expert thyroid pathologist.  The pathologist will evaluate the tissue sampled at the time of biopsy.   The pathologist will make two major determinations.    First, he/she will determine if there is enough tissue to make a diagnosis.  Usually, the specimen contains plenty of tissue.  However, in the event that the specimen contains only fluid but no cells, a repeat biopsy might be necessary.    Second, he/she will determine whether or not the nodule contains cancer cells.  Typically, a definitive diagnosis can be made (i.e. benign or malignant).  However, 5-10% of the time, a definitive diagnosis cannot be made; in this event, a repeat biopsy attempt or some other test or even referral to a thyroid surgeon might be necessary.  Biopsy results typically take 4-10 business days to be available.  As soon as they are available, a member of our office staff will call you to notify you of the results.  If you have not heard from our office 10 days after your biopsy, please call our office so that we can locate your results for you.    Please do not hesitate to call our office ([phone]) if you have questions or concerns.

## 2024-03-19 ENCOUNTER — APPOINTMENT (RX ONLY)
Dept: URBAN - METROPOLITAN AREA CLINIC 24 | Facility: CLINIC | Age: 59
Setting detail: DERMATOLOGY
End: 2024-03-19

## 2024-03-19 DIAGNOSIS — Z41.9 ENCOUNTER FOR PROCEDURE FOR PURPOSES OTHER THAN REMEDYING HEALTH STATE, UNSPECIFIED: ICD-10-CM

## 2024-03-19 PROCEDURE — ? INVENTORY

## 2024-03-19 PROCEDURE — ? GENTLELASE

## 2024-03-19 ASSESSMENT — LOCATION ZONE DERM
LOCATION ZONE: TRUNK
LOCATION ZONE: NECK

## 2024-03-19 ASSESSMENT — LOCATION SIMPLE DESCRIPTION DERM
LOCATION SIMPLE: POSTERIOR NECK
LOCATION SIMPLE: LEFT UPPER BACK

## 2024-03-19 ASSESSMENT — LOCATION DETAILED DESCRIPTION DERM
LOCATION DETAILED: LEFT SUPERIOR MEDIAL UPPER BACK
LOCATION DETAILED: RIGHT INFERIOR POSTERIOR NECK

## 2024-03-19 NOTE — PROCEDURE: GENTLELASE
Fluence: 10
Cooling: DCD setting
Endpoint: Laser Hair Removal: Immediate endpoint: perifollicular erythema and edema. Vaseline and ice applied. Post care reviewed with patient.
Post-Care Instructions: I reviewed with the patient in detail post-care instructions. Patient should avoid sun for a minimum of 4 weeks before and after treatment.
Detail Level: Detailed
Spot Size: 20 mm
Laser Type: Alexandrite 755nm
External Cooling Fan Speed: 0
Endpoint: Lentigines: Immediate endpoint: perilesional erythema and edema. Vaseline and ice applied. Post care reviewed with patient.
Pulse Duration: 3 ms
Indication: Laser Hair Removal
Consent: Written consent obtained, risks reviewed including but not limited to crusting, scabbing, blistering, scarring, darker or lighter pigmentary change, paradoxical hair regrowth, incomplete removal of hair and infection.

## 2024-06-12 ENCOUNTER — APPOINTMENT (RX ONLY)
Dept: URBAN - METROPOLITAN AREA CLINIC 24 | Facility: CLINIC | Age: 59
Setting detail: DERMATOLOGY
End: 2024-06-12

## 2024-06-12 DIAGNOSIS — L73.9 FOLLICULAR DISORDER, UNSPECIFIED: ICD-10-CM

## 2024-06-12 DIAGNOSIS — L738 OTHER SPECIFIED DISEASES OF HAIR AND HAIR FOLLICLES: ICD-10-CM

## 2024-06-12 DIAGNOSIS — L663 OTHER SPECIFIED DISEASES OF HAIR AND HAIR FOLLICLES: ICD-10-CM

## 2024-06-12 DIAGNOSIS — L20.89 OTHER ATOPIC DERMATITIS: ICD-10-CM

## 2024-06-12 DIAGNOSIS — Z71.89 OTHER SPECIFIED COUNSELING: ICD-10-CM

## 2024-06-12 DIAGNOSIS — L57.8 OTHER SKIN CHANGES DUE TO CHRONIC EXPOSURE TO NONIONIZING RADIATION: ICD-10-CM

## 2024-06-12 PROBLEM — L02.422 FURUNCLE OF LEFT AXILLA: Status: ACTIVE | Noted: 2024-06-12

## 2024-06-12 PROBLEM — L02.223 FURUNCLE OF CHEST WALL: Status: ACTIVE | Noted: 2024-06-12

## 2024-06-12 PROBLEM — L02.423 FURUNCLE OF RIGHT UPPER LIMB: Status: ACTIVE | Noted: 2024-06-12

## 2024-06-12 PROBLEM — L02.424 FURUNCLE OF LEFT UPPER LIMB: Status: ACTIVE | Noted: 2024-06-12

## 2024-06-12 PROCEDURE — 99214 OFFICE O/P EST MOD 30 MIN: CPT

## 2024-06-12 PROCEDURE — ? ADDITIONAL NOTES

## 2024-06-12 PROCEDURE — ? ORDER TESTS

## 2024-06-12 PROCEDURE — ? PRESCRIPTION MEDICATION MANAGEMENT

## 2024-06-12 PROCEDURE — ? COUNSELING

## 2024-06-12 ASSESSMENT — LOCATION DETAILED DESCRIPTION DERM
LOCATION DETAILED: STERNUM
LOCATION DETAILED: RIGHT ANTERIOR PROXIMAL UPPER ARM
LOCATION DETAILED: LEFT ANTERIOR PROXIMAL UPPER ARM
LOCATION DETAILED: LEFT AXILLARY VAULT
LOCATION DETAILED: LEFT POSTERIOR SHOULDER
LOCATION DETAILED: RIGHT MEDIAL INFERIOR CHEST
LOCATION DETAILED: RIGHT POSTERIOR SHOULDER
LOCATION DETAILED: RIGHT SUPERIOR MEDIAL UPPER BACK
LOCATION DETAILED: LEFT ANTERIOR MEDIAL PROXIMAL UPPER ARM

## 2024-06-12 ASSESSMENT — LOCATION SIMPLE DESCRIPTION DERM
LOCATION SIMPLE: LEFT AXILLARY VAULT
LOCATION SIMPLE: LEFT UPPER ARM
LOCATION SIMPLE: RIGHT SHOULDER
LOCATION SIMPLE: RIGHT UPPER BACK
LOCATION SIMPLE: RIGHT UPPER ARM
LOCATION SIMPLE: CHEST
LOCATION SIMPLE: LEFT SHOULDER

## 2024-06-12 ASSESSMENT — LOCATION ZONE DERM
LOCATION ZONE: TRUNK
LOCATION ZONE: ARM
LOCATION ZONE: AXILLAE

## 2024-06-12 NOTE — PROCEDURE: PRESCRIPTION MEDICATION MANAGEMENT
Detail Level: Zone
Plan: Recommend OTC Benzoyl Peroxide QD- advised to use 1st in shower- QD\\nHot compresses to angry lesions
Render In Strict Bullet Format?: No
Initiate Treatment: Clindamycin solution
Initiate Treatment: Triamcinolone 0.1% cream BID PRN

## 2024-06-12 NOTE — PROCEDURE: ADDITIONAL NOTES
Additional Notes: Discussed Accutane with patient as a last resort since he has failed several treatments\\n\\nMay consider Robinul as he sweats a lot but he didn’t tolerate in the past maybe this fall (or oxybutinin)
Detail Level: Simple
Render Risk Assessment In Note?: no

## 2024-06-12 NOTE — PROCEDURE: ORDER TESTS
Billing Type: Third-Party Bill
Bill For Surgical Tray: no
Expected Date Of Service: 06/12/2024
Performing Laboratory: 0

## 2024-06-17 ENCOUNTER — RX ONLY (OUTPATIENT)
Age: 59
Setting detail: RX ONLY
End: 2024-06-17

## 2024-06-17 RX ORDER — DOXYCYCLINE HYCLATE 100 MG/1
CAPSULE, GELATIN COATED ORAL
Qty: 28 | Refills: 0 | Status: ERX | COMMUNITY
Start: 2024-06-17

## 2024-06-18 ENCOUNTER — RX ONLY (OUTPATIENT)
Age: 59
Setting detail: RX ONLY
End: 2024-06-18

## 2024-06-18 RX ORDER — GLYCOPYRROLATE 1 MG/1
TABLET ORAL
Qty: 60 | Refills: 2 | Status: ERX | COMMUNITY
Start: 2024-06-18

## 2024-09-18 ENCOUNTER — APPOINTMENT (RX ONLY)
Dept: URBAN - METROPOLITAN AREA CLINIC 24 | Facility: CLINIC | Age: 59
Setting detail: DERMATOLOGY
End: 2024-09-18

## 2024-09-18 ENCOUNTER — RX ONLY (OUTPATIENT)
Age: 59
Setting detail: RX ONLY
End: 2024-09-18

## 2024-09-18 DIAGNOSIS — L738 OTHER SPECIFIED DISEASES OF HAIR AND HAIR FOLLICLES: ICD-10-CM

## 2024-09-18 DIAGNOSIS — L57.8 OTHER SKIN CHANGES DUE TO CHRONIC EXPOSURE TO NONIONIZING RADIATION: ICD-10-CM

## 2024-09-18 DIAGNOSIS — L73.9 FOLLICULAR DISORDER, UNSPECIFIED: ICD-10-CM

## 2024-09-18 DIAGNOSIS — L663 OTHER SPECIFIED DISEASES OF HAIR AND HAIR FOLLICLES: ICD-10-CM

## 2024-09-18 DIAGNOSIS — L82.1 OTHER SEBORRHEIC KERATOSIS: ICD-10-CM

## 2024-09-18 DIAGNOSIS — Z71.89 OTHER SPECIFIED COUNSELING: ICD-10-CM

## 2024-09-18 DIAGNOSIS — D22 MELANOCYTIC NEVI: ICD-10-CM

## 2024-09-18 PROBLEM — L02.424 FURUNCLE OF LEFT UPPER LIMB: Status: ACTIVE | Noted: 2024-09-18

## 2024-09-18 PROBLEM — D22.5 MELANOCYTIC NEVI OF TRUNK: Status: ACTIVE | Noted: 2024-09-18

## 2024-09-18 PROBLEM — L02.223 FURUNCLE OF CHEST WALL: Status: ACTIVE | Noted: 2024-09-18

## 2024-09-18 PROBLEM — L02.222 FURUNCLE OF BACK [ANY PART, EXCEPT BUTTOCK]: Status: ACTIVE | Noted: 2024-09-18

## 2024-09-18 PROCEDURE — 99214 OFFICE O/P EST MOD 30 MIN: CPT

## 2024-09-18 PROCEDURE — ? PRESCRIPTION

## 2024-09-18 PROCEDURE — ? OTHER

## 2024-09-18 PROCEDURE — ? PRESCRIPTION MEDICATION MANAGEMENT

## 2024-09-18 PROCEDURE — ? COUNSELING

## 2024-09-18 RX ORDER — GLYCOPYRROLATE 1 MG/1
TABLET ORAL
Qty: 60 | Refills: 2 | Status: ERX | COMMUNITY
Start: 2024-09-18

## 2024-09-18 RX ORDER — GLYCOPYRROLATE 1 MG/1
TABLET ORAL
Qty: 90 | Refills: 2 | Status: ERX | COMMUNITY
Start: 2024-09-18

## 2024-09-18 RX ADMIN — GLYCOPYRROLATE: 1 TABLET ORAL at 00:00

## 2024-09-18 ASSESSMENT — LOCATION DETAILED DESCRIPTION DERM
LOCATION DETAILED: LEFT PROXIMAL POSTERIOR UPPER ARM
LOCATION DETAILED: LEFT LATERAL INFERIOR CHEST
LOCATION DETAILED: LEFT INFERIOR UPPER BACK
LOCATION DETAILED: LEFT MID-UPPER BACK
LOCATION DETAILED: LEFT LATERAL SUPERIOR CHEST
LOCATION DETAILED: LEFT LATERAL UPPER BACK

## 2024-09-18 ASSESSMENT — BSA RASH: BSA RASH: 20

## 2024-09-18 ASSESSMENT — LOCATION ZONE DERM
LOCATION ZONE: ARM
LOCATION ZONE: TRUNK

## 2024-09-18 ASSESSMENT — SEVERITY ASSESSMENT: SEVERITY: MILD TO MODERATE

## 2024-09-18 ASSESSMENT — LOCATION SIMPLE DESCRIPTION DERM
LOCATION SIMPLE: LEFT UPPER BACK
LOCATION SIMPLE: LEFT UPPER ARM
LOCATION SIMPLE: CHEST

## 2024-09-18 NOTE — PROCEDURE: PRESCRIPTION MEDICATION MANAGEMENT
Detail Level: Zone
Render In Strict Bullet Format?: No
Continue Regimen: salicylic body was, clindamycin, and robinul daily for sweating

## 2024-09-18 NOTE — PROCEDURE: OTHER
Render Risk Assessment In Note?: no
Other (Free Text): He continues to balk at accutane.\\n\\nHe asked about spironolactone bc this helped his daughters acne I mentioned this is more for hormonal acne but we could consider it he would need to work with pcp and cardiologist if this was the case\\n\\nCulture is negative biopsy has shown folliculitis
Detail Level: Zone
Note Text (......Xxx Chief Complaint.): This diagnosis correlates with the

## 2024-09-18 NOTE — PROCEDURE: COUNSELING
Detail Level: Detailed
Detail Level: Simple
Detail Level: Generalized
Patient Specific Counseling (Will Not Stick From Patient To Patient): Pt using topical clindamycin discussed skin dryness and accurate. Begin Salicylic wasneeded

## 2024-10-15 ENCOUNTER — OFFICE VISIT (OUTPATIENT)
Age: 59
End: 2024-10-15
Payer: COMMERCIAL

## 2024-10-15 VITALS
HEART RATE: 80 BPM | WEIGHT: 284 LBS | HEIGHT: 69 IN | BODY MASS INDEX: 42.06 KG/M2 | SYSTOLIC BLOOD PRESSURE: 136 MMHG | DIASTOLIC BLOOD PRESSURE: 88 MMHG

## 2024-10-15 DIAGNOSIS — I10 ESSENTIAL HYPERTENSION: Primary | ICD-10-CM

## 2024-10-15 DIAGNOSIS — Z01.810 PREOP CARDIOVASCULAR EXAM: ICD-10-CM

## 2024-10-15 DIAGNOSIS — Z98.890 S/P MITRAL VALVE REPAIR: ICD-10-CM

## 2024-10-15 PROCEDURE — 3075F SYST BP GE 130 - 139MM HG: CPT | Performed by: INTERNAL MEDICINE

## 2024-10-15 PROCEDURE — 99214 OFFICE O/P EST MOD 30 MIN: CPT | Performed by: INTERNAL MEDICINE

## 2024-10-15 PROCEDURE — 93000 ELECTROCARDIOGRAM COMPLETE: CPT | Performed by: INTERNAL MEDICINE

## 2024-10-15 PROCEDURE — 3079F DIAST BP 80-89 MM HG: CPT | Performed by: INTERNAL MEDICINE

## 2024-10-15 RX ORDER — VALSARTAN 40 MG/1
40 TABLET ORAL DAILY
Qty: 90 TABLET | Refills: 3 | Status: SHIPPED | OUTPATIENT
Start: 2024-10-15

## 2024-10-15 ASSESSMENT — ENCOUNTER SYMPTOMS
VOMITING: 0
COUGH: 0
BACK PAIN: 0
ORTHOPNEA: 0
DOUBLE VISION: 0
ABDOMINAL PAIN: 0
BLURRED VISION: 0
BLOATING: 0
SHORTNESS OF BREATH: 0
HEMOPTYSIS: 0
NAUSEA: 0

## 2024-10-15 NOTE — PROGRESS NOTES
Taking clonazepam PRN at home.    Continue clonazepam PRN while inpatient.
  Procedure Laterality Date    CHOLECYSTECTOMY, LAPAROSCOPIC  2009    CYSTOSCOPY W/ STONE MANIPULATION  1983    ERCP      LYMPHADENECTOMY      Left cervical- benign    MITRAL VALVE REPAIR      SINUS SURGERY  2002     Family History   Adopted: Yes   Problem Relation Age of Onset    Hypothyroidism Daughter     Thyroid Cancer Neg Hx      Social History     Tobacco Use    Smoking status: Never    Smokeless tobacco: Never   Substance Use Topics    Alcohol use: No     Alcohol/week: 0.0 standard drinks of alcohol     Current Outpatient Medications   Medication Sig Dispense Refill    valsartan (DIOVAN) 40 MG tablet Take 1 tablet by mouth daily 90 tablet 3    meclizine (ANTIVERT) 25 MG tablet Take 1-2 tablets by mouth 3 times daily as needed      sildenafil (VIAGRA) 50 MG tablet Take 1 tablet by mouth as needed      tamsulosin (FLOMAX) 0.4 MG capsule Take 1 capsule by mouth daily      aspirin 81 MG EC tablet Take 1 tablet by mouth      cetirizine (ZYRTEC) 10 MG tablet Take 1 tablet by mouth daily as needed      loratadine (CLARITIN) 10 MG tablet Take by mouth daily as needed      omeprazole (PRILOSEC) 40 MG delayed release capsule Take 1 capsule by mouth      valACYclovir (VALTREX) 1 g tablet Take 0.5 tablets by mouth daily      famotidine (PEPCID) 20 MG tablet Take 1 tablet by mouth daily (Patient not taking: Reported on 3/8/2024)      PREDNISONE, SOLO, PO Take by mouth (Patient not taking: Reported on 10/15/2024)      Amoxicillin 500 MG TABS Take 4 tablets prior to dental procedure (Patient not taking: Reported on 3/8/2024)       No current facility-administered medications for this visit.       Review of Systems   Constitutional: Negative for chills, decreased appetite, fever, malaise/fatigue and weight gain.   HENT:  Negative for nosebleeds.    Eyes:  Negative for blurred vision and double vision.   Cardiovascular:  Positive for chest pain (atypical). Negative for claudication, dyspnea on exertion, leg swelling,

## 2024-11-25 ENCOUNTER — APPOINTMENT (RX ONLY)
Dept: URBAN - METROPOLITAN AREA CLINIC 24 | Facility: CLINIC | Age: 59
Setting detail: DERMATOLOGY
End: 2024-11-25

## 2024-11-25 DIAGNOSIS — Z41.9 ENCOUNTER FOR PROCEDURE FOR PURPOSES OTHER THAN REMEDYING HEALTH STATE, UNSPECIFIED: ICD-10-CM

## 2024-11-25 PROCEDURE — ? INVENTORY

## 2024-11-25 PROCEDURE — ? GENTLELASE

## 2024-11-25 PROCEDURE — ? ADDITIONAL NOTES

## 2024-11-25 ASSESSMENT — LOCATION DETAILED DESCRIPTION DERM
LOCATION DETAILED: RIGHT SUPERIOR LATERAL MIDBACK
LOCATION DETAILED: LEFT MEDIAL TRAPEZIAL NECK
LOCATION DETAILED: RIGHT SUPERIOR LATERAL UPPER BACK
LOCATION DETAILED: LEFT SUPERIOR MEDIAL LOWER BACK
LOCATION DETAILED: LEFT SUPERIOR UPPER BACK

## 2024-11-25 ASSESSMENT — LOCATION SIMPLE DESCRIPTION DERM
LOCATION SIMPLE: LEFT LOWER BACK
LOCATION SIMPLE: RIGHT LOWER BACK
LOCATION SIMPLE: LEFT UPPER BACK
LOCATION SIMPLE: POSTERIOR NECK
LOCATION SIMPLE: RIGHT UPPER BACK

## 2024-11-25 ASSESSMENT — LOCATION ZONE DERM
LOCATION ZONE: TRUNK
LOCATION ZONE: NECK

## 2024-11-25 NOTE — PROCEDURE: ADDITIONAL NOTES
Detail Level: Simple
Render Risk Assessment In Note?: no
Additional Notes: going [place] for wedding!\\nknows its going to $45 after this\\ndid 18, 14 on back

## 2024-11-25 NOTE — PROCEDURE: GENTLELASE
Endpoint: Seborrheic Keratoses: Immediate endpoint: perilesional erythema and edema. Vaseline and ice applied. Post care reviewed with patient.
Indication: Laser Hair Removal
Detail Level: Detailed
Consent: Written consent obtained, risks reviewed including but not limited to crusting, scabbing, blistering, scarring, darker or lighter pigmentary change, paradoxical hair regrowth, incomplete removal of hair and infection.
Fluence: 14
Endpoint: Laser Hair Removal: Immediate endpoint: perifollicular erythema and edema. Vaseline and ice applied. Post care reviewed with patient.
Post-Care Instructions: I reviewed with the patient in detail post-care instructions. Patient should avoid sun for a minimum of 4 weeks before and after treatment.
Pulse Duration: 3 ms
Cooling: DCD setting
Spot Size: 18 mm
External Cooling Fan Speed: 0
Laser Type: Alexandrite 755nm

## 2024-12-27 ENCOUNTER — RX ONLY (RX ONLY)
Age: 59
End: 2024-12-27

## 2024-12-27 RX ORDER — CLINDAMYCIN PHOSPHATE 10 MG/ML
SOLUTION TOPICAL
Qty: 120 | Refills: 0 | Status: ERX | COMMUNITY
Start: 2024-12-27

## 2025-01-13 NOTE — PROCEDURE: PRESCRIPTION MEDICATION MANAGEMENT
Patient called requesting ppd vaccine(s) for the following reason(s): employer requesting.     No order in system. Please advise and/or schedule accordingly.      Pt states she is available to come in today or tomorrow after 2pm.   
Spoke to patient. She is aware the quantiferon Gold b/w was put in and she can go at any time.   
Detail Level: Zone
Render In Strict Bullet Format?: No
Initiate Treatment: Triamcinolone 0.1% cream BID PRN
Plan: Recommend OTC Benzoyl Peroxide QD
Initiate Treatment: Clindamycin solution

## 2025-03-13 ENCOUNTER — OFFICE VISIT (OUTPATIENT)
Dept: ENDOCRINOLOGY | Age: 60
End: 2025-03-13
Payer: COMMERCIAL

## 2025-03-13 VITALS
HEART RATE: 76 BPM | OXYGEN SATURATION: 96 % | WEIGHT: 285 LBS | BODY MASS INDEX: 40.8 KG/M2 | DIASTOLIC BLOOD PRESSURE: 80 MMHG | HEIGHT: 70 IN | SYSTOLIC BLOOD PRESSURE: 125 MMHG

## 2025-03-13 DIAGNOSIS — E04.2 MULTIPLE THYROID NODULES: Primary | ICD-10-CM

## 2025-03-13 PROCEDURE — 3074F SYST BP LT 130 MM HG: CPT | Performed by: INTERNAL MEDICINE

## 2025-03-13 PROCEDURE — 99213 OFFICE O/P EST LOW 20 MIN: CPT | Performed by: INTERNAL MEDICINE

## 2025-03-13 PROCEDURE — 76536 US EXAM OF HEAD AND NECK: CPT | Performed by: INTERNAL MEDICINE

## 2025-03-13 PROCEDURE — 3079F DIAST BP 80-89 MM HG: CPT | Performed by: INTERNAL MEDICINE

## 2025-03-13 ASSESSMENT — ENCOUNTER SYMPTOMS
VOICE CHANGE: 1
TROUBLE SWALLOWING: 0

## 2025-03-13 NOTE — PROGRESS NOTES
DENIS Mccormick MD, FACE    [facility] ENDOCRINOLOGY   AND   THYROID NODULE CLINIC            Reason for visit: Follow-up of a thyroid nodule      ASSESSMENT AND PLAN:    1. Multiple thyroid nodules  Biopsy of the dominant nodule in the left lobe in March 2024 was benign.  Ultrasound was repeated today (see below) and is unchanged.  Repeat in 1 year.  - US,HEAD/NECK TISSUES,REAL TIME      PROCEDURES:    HEAD AND NECK ULTRASOUND    Date of study:  3/13/2025    Performing/interpreting physician:  DENIS Mccormick MD, FACE    Indication:  thyroid nodule    Technique:  Using a 12 MHz linear transducer, multiple real-time planar images were obtained of the thyroid and surrounding tissues.      Findings:  Right lobe 2.66 x 2.47 x 5.86 cm, isthmus 0.96 cm, left lobe 3.48 x 3.77 x 6.09 cm.  Heterogeneous echotexture.  Normal blood flow.  0.65 x 0.90 x 0.83 cm isoechoic spongiform nodule without calcifications or increased blood flow in the midportion of the right lobe (TR 2).  1.66 x 1.92 x 2.22 cm isoechoic nodule with ill-defined margins and no calcifications or increased blood flow at the right lower pole (TR 3).  2.92 x 3.62 x 4.26 cm isoechoic nodule with punctate echogenic foci in the left lobe (TR 5).    Impression: Bilateral thyroid nodules as noted.    [employer] TI-RADS recommendations:   TR1: Benign, no FNA or ultrasound follow-up   TR2: Nonsuspicious, no FNA or ultrasound follow-up   TR3: Mildly suspicious, FNA if greater than or equal to 2.5 cm, follow with ultrasound at 1, 3,   and 5 years if greater than or equal to 1.5 cm.   TR4: Moderately suspicious, FNA if greater than or equal to 1.5 cm, follow with ultrasound at 1,   2, 3, and 5 years if greater than or equal to 1 cm.   TR5: Highly suspicious, FNA if greater than or equal to 1 cm, follow with ultrasound annually   for 5 years if greater than or equal to 0.5 cm.           Follow-up and Dispositions    Return in about 1 year (around

## 2025-04-10 ENCOUNTER — TELEPHONE (OUTPATIENT)
Age: 60
End: 2025-04-10

## 2025-04-10 NOTE — TELEPHONE ENCOUNTER
He has been having tightness in his chest since Friday.He picked up something heavy Friday and hurting since then.Feels like  cramp /indigestion/tightness.If he turns it hurts.Pain was worse over the weekend and he has been taking Tylenol which seems to help.If he twist or turns it hurts.  He has been having problems breathing.Walking up flight of steps is hard to breathe.    I offered an appt.this Wed.w/ which pt.declined.He has decided to see PCP or Urgent Care today instead.I did make appt.in May which pt.accepted.

## 2025-05-02 ENCOUNTER — OFFICE VISIT (OUTPATIENT)
Age: 60
End: 2025-05-02
Payer: COMMERCIAL

## 2025-05-02 VITALS
HEIGHT: 70 IN | WEIGHT: 276.1 LBS | SYSTOLIC BLOOD PRESSURE: 138 MMHG | HEART RATE: 89 BPM | BODY MASS INDEX: 39.53 KG/M2 | DIASTOLIC BLOOD PRESSURE: 82 MMHG

## 2025-05-02 DIAGNOSIS — I10 ESSENTIAL HYPERTENSION: ICD-10-CM

## 2025-05-02 DIAGNOSIS — R06.09 DYSPNEA ON EXERTION: ICD-10-CM

## 2025-05-02 DIAGNOSIS — Z98.890 S/P MITRAL VALVE REPAIR: Primary | ICD-10-CM

## 2025-05-02 LAB — D DIMER PPP FEU-MCNC: 0.31 UG/ML(FEU)

## 2025-05-02 PROCEDURE — 99214 OFFICE O/P EST MOD 30 MIN: CPT | Performed by: INTERNAL MEDICINE

## 2025-05-02 PROCEDURE — 3075F SYST BP GE 130 - 139MM HG: CPT | Performed by: INTERNAL MEDICINE

## 2025-05-02 PROCEDURE — 3079F DIAST BP 80-89 MM HG: CPT | Performed by: INTERNAL MEDICINE

## 2025-05-02 PROCEDURE — 93000 ELECTROCARDIOGRAM COMPLETE: CPT | Performed by: INTERNAL MEDICINE

## 2025-05-02 NOTE — PROGRESS NOTES
[facility] CARDIOLOGY  [address] [place] [address], [address]  [place] [address]  PHONE: [phone]    25    NAME:  Quique Tipton  : 1965  MRN: [id number]         SUBJECTIVE:   Quique Tipton is a 60 y.o. male seen for a visit regarding the following:     Chief Complaint   Patient presents with    Hypertension    mitral valve regurgitation    Chest Pain       HPI:      History of severe MR status post mitral valve repair [P2 resection and 32 mm annuloplasty ring; 17].  Noted postop echo with preserved EF and no mitral regurgitation.  Preop coronary angiogram with normal coronaries [3/2017].  Admission with pancreatitis and atypical chest pain (17).  Prior issues with nephrolithiasis ()    Sporadic episodes of sharp chest discomfort/dyspnea; no issues with knee surgery.    Prior--States upcoming plans for knee surgery.  Can walk a couple flights with no issues.  Denies any chest discomfort.  No palpitations.  Well-controlled home BPs     Prior --since last visit, states doing much better on valsartan 40 mg daily and currently tolerating well.  Well-controlled Home BPs.  Prior difficulty tolerating losartan, spironolactone.  Can do all his ADLs and walk with no cardiac issues.  Some knee pain with a meniscal injury limiting ambulation currently.    Prior---Mostly with some issues with elevated BPs.  States some intermittent dizziness and 1 night woke up with some transient chest discomfort.  Blood with systolics in the 160s to 180s.  Was on HCTZ 25 mg daily which was stopped.  States can still walk over a mile with no issues.    Prior--No new issues since last visit. No chest pain. Can walk over a mile with no sig issues. No BOND.  Down about 6 pounds since last visit.  Denies any palpitations.     Prior---Still with some issues with atypical sharp right-sided chest discomfort/tightness after some strenuous activity which appears musculoskeletal related; noted worse when he swims with

## 2025-05-08 ENCOUNTER — APPOINTMENT (OUTPATIENT)
Dept: URBAN - METROPOLITAN AREA CLINIC 24 | Facility: CLINIC | Age: 60
Setting detail: DERMATOLOGY
End: 2025-05-08

## 2025-05-08 DIAGNOSIS — Z41.9 ENCOUNTER FOR PROCEDURE FOR PURPOSES OTHER THAN REMEDYING HEALTH STATE, UNSPECIFIED: ICD-10-CM

## 2025-05-08 PROCEDURE — ? INVENTORY

## 2025-05-08 PROCEDURE — ? GENTLELASE

## 2025-05-08 ASSESSMENT — LOCATION SIMPLE DESCRIPTION DERM
LOCATION SIMPLE: LEFT SHOULDER
LOCATION SIMPLE: LEFT EAR
LOCATION SIMPLE: LEFT TEMPLE
LOCATION SIMPLE: LEFT LOWER BACK
LOCATION SIMPLE: LEFT UPPER BACK
LOCATION SIMPLE: RIGHT LOWER BACK
LOCATION SIMPLE: RIGHT TEMPLE
LOCATION SIMPLE: RIGHT UPPER BACK
LOCATION SIMPLE: RIGHT SHOULDER
LOCATION SIMPLE: POSTERIOR NECK
LOCATION SIMPLE: RIGHT EAR

## 2025-05-08 ASSESSMENT — LOCATION ZONE DERM
LOCATION ZONE: EAR
LOCATION ZONE: TRUNK
LOCATION ZONE: NECK
LOCATION ZONE: FACE
LOCATION ZONE: ARM

## 2025-05-08 ASSESSMENT — LOCATION DETAILED DESCRIPTION DERM
LOCATION DETAILED: LEFT LATERAL UPPER BACK
LOCATION DETAILED: RIGHT SUPERIOR HELIX
LOCATION DETAILED: RIGHT INFERIOR TEMPLE
LOCATION DETAILED: RIGHT SUPERIOR LATERAL UPPER BACK
LOCATION DETAILED: RIGHT INFERIOR POSTERIOR NECK
LOCATION DETAILED: LEFT INFERIOR TEMPLE
LOCATION DETAILED: LEFT INFERIOR LATERAL MIDBACK
LOCATION DETAILED: RIGHT ANTITRAGUS
LOCATION DETAILED: RIGHT POSTERIOR SHOULDER
LOCATION DETAILED: RIGHT INFERIOR LATERAL LOWER BACK
LOCATION DETAILED: LEFT ANTERIOR EARLOBE
LOCATION DETAILED: LEFT POSTERIOR SHOULDER
LOCATION DETAILED: RIGHT LATERAL UPPER BACK
LOCATION DETAILED: LEFT INFERIOR CRUS OF ANTIHELIX

## 2025-05-08 NOTE — PROCEDURE: GENTLELASE
External Cooling Fan Speed: 0
Indication: Laser Hair Removal
Fluence: 14
Pulse Duration: 3 ms
Detail Level: Detailed
Endpoint: Seborrheic Keratoses: Immediate endpoint: perilesional erythema and edema. Vaseline and ice applied. Post care reviewed with patient.
Cooling: DCD setting
Spot Size: 18 mm
Consent: Written consent obtained, risks reviewed including but not limited to crusting, scabbing, blistering, scarring, darker or lighter pigmentary change, paradoxical hair regrowth, incomplete removal of hair and infection.
Endpoint: Laser Hair Removal: Immediate endpoint: perifollicular erythema and edema. Vaseline and ice applied. Post care reviewed with patient.
Post-Care Instructions: I reviewed with the patient in detail post-care instructions. Patient should avoid sun for a minimum of 4 weeks before and after treatment.
Laser Type: Alexandrite 755nm

## 2025-05-29 ENCOUNTER — TELEPHONE (OUTPATIENT)
Age: 60
End: 2025-05-29

## 2025-05-29 NOTE — TELEPHONE ENCOUNTER
Patient had Echo and has an apt. In July. Patient would like results and if ok to waith that long to come back in for results and talk with doctor.

## 2025-06-23 ENCOUNTER — APPOINTMENT (OUTPATIENT)
Dept: URBAN - METROPOLITAN AREA CLINIC 24 | Facility: CLINIC | Age: 60
Setting detail: DERMATOLOGY
End: 2025-06-23

## 2025-06-23 DIAGNOSIS — L73.9 FOLLICULAR DISORDER, UNSPECIFIED: ICD-10-CM

## 2025-06-23 DIAGNOSIS — Z71.89 OTHER SPECIFIED COUNSELING: ICD-10-CM

## 2025-06-23 DIAGNOSIS — L82.0 INFLAMED SEBORRHEIC KERATOSIS: ICD-10-CM

## 2025-06-23 DIAGNOSIS — L57.8 OTHER SKIN CHANGES DUE TO CHRONIC EXPOSURE TO NONIONIZING RADIATION: ICD-10-CM

## 2025-06-23 PROCEDURE — ? LIQUID NITROGEN

## 2025-06-23 PROCEDURE — ? PRESCRIPTION MEDICATION MANAGEMENT

## 2025-06-23 PROCEDURE — ? OTHER

## 2025-06-23 PROCEDURE — ? COUNSELING

## 2025-06-23 ASSESSMENT — LOCATION DETAILED DESCRIPTION DERM
LOCATION DETAILED: RIGHT INFERIOR CENTRAL MALAR CHEEK
LOCATION DETAILED: RIGHT SUPERIOR CENTRAL BUCCAL CHEEK
LOCATION DETAILED: LEFT INFERIOR UPPER BACK
LOCATION DETAILED: LEFT PROXIMAL POSTERIOR UPPER ARM
LOCATION DETAILED: LEFT LATERAL INFERIOR CHEST
LOCATION DETAILED: LEFT LATERAL UPPER BACK

## 2025-06-23 ASSESSMENT — LOCATION SIMPLE DESCRIPTION DERM
LOCATION SIMPLE: LEFT UPPER ARM
LOCATION SIMPLE: RIGHT CHEEK
LOCATION SIMPLE: LEFT UPPER BACK
LOCATION SIMPLE: CHEST

## 2025-06-23 ASSESSMENT — LOCATION ZONE DERM
LOCATION ZONE: ARM
LOCATION ZONE: FACE
LOCATION ZONE: TRUNK

## 2025-06-23 NOTE — PROCEDURE: COUNSELING
Detail Level: Detailed
Patient Specific Counseling (Will Not Stick From Patient To Patient): Pt using topical clindamycin discussed skin dryness and accurate. Begin Salicylic wasneeded

## 2025-06-23 NOTE — PROCEDURE: OTHER
Render Risk Assessment In Note?: no
Other (Free Text): His Folliculitis is stable but still present.  He complains of dizziness from glycopyrrolate.  I recommend consistent use of glycopyrrolate at night.  Oral antibiotics and sun exposure has been the most helpful for him in the past.  \\n\\nHe continues to balk at accutane.\\n\\nHe asked about spironolactone bc this helped his daughters acne I mentioned this is more for hormonal acne but we could consider it he would need to work with pcp and cardiologist if this was the case\\n\\nCulture is negative biopsy has shown folliculitis
Detail Level: Zone
Note Text (......Xxx Chief Complaint.): This diagnosis correlates with the

## 2025-06-23 NOTE — PROCEDURE: LIQUID NITROGEN
Include Z78.9 (Other Specified Conditions Influencing Health Status) As An Associated Diagnosis?: No
Detail Level: Detailed
Show Applicator Variable?: Yes
Post-Care Instructions: I reviewed with the patient in detail post-care instructions. Patient is to wear sunprotection, and avoid picking at any of the treated lesions. Pt may apply Vaseline to crusted or scabbing areas.
Medical Necessity Information: It is in your best interest to select a reason for this procedure from the list below. All of these items fulfill various CMS LCD requirements except the new and changing color options.
Medical Necessity Clause: This procedure was medically necessary because the lesions that were treated were:
Consent: The patient's consent was obtained including but not limited to risks of crusting, scabbing, blistering, scarring, darker or lighter pigmentary change, recurrence, incomplete removal and infection.
Spray Paint Text: The liquid nitrogen was applied to the skin utilizing a spray paint frosting technique.

## 2025-07-11 ENCOUNTER — OFFICE VISIT (OUTPATIENT)
Age: 60
End: 2025-07-11
Payer: COMMERCIAL

## 2025-07-11 VITALS
HEART RATE: 68 BPM | HEIGHT: 70 IN | DIASTOLIC BLOOD PRESSURE: 80 MMHG | BODY MASS INDEX: 40.23 KG/M2 | SYSTOLIC BLOOD PRESSURE: 130 MMHG | WEIGHT: 281 LBS

## 2025-07-11 DIAGNOSIS — I10 ESSENTIAL HYPERTENSION: ICD-10-CM

## 2025-07-11 DIAGNOSIS — Z98.890 S/P MITRAL VALVE REPAIR: Primary | ICD-10-CM

## 2025-07-11 PROCEDURE — 3075F SYST BP GE 130 - 139MM HG: CPT | Performed by: INTERNAL MEDICINE

## 2025-07-11 PROCEDURE — 3079F DIAST BP 80-89 MM HG: CPT | Performed by: INTERNAL MEDICINE

## 2025-07-11 PROCEDURE — 99214 OFFICE O/P EST MOD 30 MIN: CPT | Performed by: INTERNAL MEDICINE

## 2025-07-11 RX ORDER — VALSARTAN 40 MG/1
40 TABLET ORAL DAILY
Qty: 90 TABLET | Refills: 3 | Status: SHIPPED | OUTPATIENT
Start: 2025-07-11

## 2025-07-11 ASSESSMENT — ENCOUNTER SYMPTOMS
ORTHOPNEA: 0
BLOATING: 0
VOMITING: 0
SHORTNESS OF BREATH: 0
BACK PAIN: 0
HEMOPTYSIS: 0
BLURRED VISION: 0
COUGH: 0
DOUBLE VISION: 0
NAUSEA: 0
ABDOMINAL PAIN: 0

## 2025-07-11 NOTE — PROGRESS NOTES
medications for this visit.       Review of Systems   Constitutional: Negative for chills, decreased appetite, fever, malaise/fatigue and weight gain.   HENT:  Negative for nosebleeds.    Eyes:  Negative for blurred vision and double vision.   Cardiovascular:  Negative for chest pain, claudication, dyspnea on exertion, leg swelling, orthopnea, palpitations, paroxysmal nocturnal dyspnea and syncope.   Respiratory:  Negative for cough, hemoptysis and shortness of breath.    Endocrine: Negative for cold intolerance and heat intolerance.   Hematologic/Lymphatic: Negative for bleeding problem.   Skin:  Negative for rash.   Musculoskeletal:  Positive for joint pain. Negative for back pain, muscle weakness and myalgias.   Gastrointestinal:  Negative for bloating, abdominal pain, nausea and vomiting.   Genitourinary:  Negative for dysuria.   Neurological:  Negative for dizziness, light-headedness and weakness.   Psychiatric/Behavioral:  Negative for altered mental status.        PHYSICAL EXAM:    /80   Pulse 68   Ht 1.778 m (5' 10\")   Wt 127.5 kg (281 lb)   BMI 40.32 kg/m²      Physical Exam  Constitutional:       Appearance: Normal appearance.   HENT:      Head: Normocephalic and atraumatic.      Mouth/Throat:      Mouth: Mucous membranes are moist.   Eyes:      Pupils: Pupils are equal, round, and reactive to light.   Cardiovascular:      Rate and Rhythm: Normal rate and regular rhythm.      Pulses: Normal pulses.   Pulmonary:      Effort: Pulmonary effort is normal.      Breath sounds: Normal breath sounds.   Abdominal:      General: Bowel sounds are normal. There is no distension.      Palpations: Abdomen is soft.      Tenderness: There is no abdominal tenderness.   Musculoskeletal:         General: No swelling.      Cervical back: Normal range of motion.   Skin:     General: Skin is warm and dry.   Neurological:      General: No focal deficit present.      Mental Status: He is alert and oriented to person,

## 2025-07-22 RX ORDER — AMOXICILLIN 500 MG/1
2000 TABLET, FILM COATED ORAL AS NEEDED
Qty: 4 TABLET | Refills: 0 | Status: SHIPPED | OUTPATIENT
Start: 2025-07-22

## 2025-07-22 NOTE — TELEPHONE ENCOUNTER
MEDICATION REFILL REQUEST      Name of Medication:  Amoxicillin  Dose:  ?  Frequency:  ?  Quantity:  ?  Days' supply:  ?      Pharmacy Name/Location:  Call pt- Pt needs for a dental visit

## 2025-07-22 NOTE — TELEPHONE ENCOUNTER
Requested Prescriptions     Pending Prescriptions Disp Refills    amoxicillin (AMOXIL) 500 MG tablet 4 tablet 0     Sig: Take 4 tablets by mouth as needed (take 4 capsules 60 minutes prior to dental work) Take 4 tablets prior to dental procedure         Verified rx. Last OV 7/11/25. Erx to pharm on file.

## (undated) DEVICE — SOLUTION IRRIG 3000ML 0.9% SOD CHL FLX CONT 0797208] ICU MEDICAL INC]

## (undated) DEVICE — SUTURE NONABSORBABLE BRAID SILK BLK RB-1 3-0 24IN K572H

## (undated) DEVICE — SUTURE NONABSORBABLE BRAIDED 2-0 RB-1 10X30 IN ETHBND EXCEL 10X42

## (undated) DEVICE — INTENT TO BE USED WITH SUTURE MATERIAL FOR TISSUE CLOSURE: Brand: RICHARD-ALLAN® NEEDLE 1/2 CIRCLE TAPER

## (undated) DEVICE — TRAY FOLEY 16FR TEMP SENS F400 --

## (undated) DEVICE — REM POLYHESIVE ADULT PATIENT RETURN ELECTRODE: Brand: VALLEYLAB

## (undated) DEVICE — Device

## (undated) DEVICE — SUT ETHBND 5-0 36IN RB1 DA GRN --

## (undated) DEVICE — SUT CHRMC 4-0 27IN SH BRN --

## (undated) DEVICE — COR-KNOT® MIS DEVICE KIT: Brand: COR-KNOT®

## (undated) DEVICE — COR-KNOT® QUICK LOAD® SINGLES: Brand: COR-KNOT® QUICK LOAD®

## (undated) DEVICE — SUTURE VCRL SZ 4-0 L27IN ABSRB UD L19MM PS-2 3/8 CIR PRIM J426H

## (undated) DEVICE — SURGIFOAM SPNG SZ 100

## (undated) DEVICE — DRAIN CHEST ADL/PED DRY/WET -- PLEUR-EVAC

## (undated) DEVICE — (D)STRIP SKN CLSR 0.5X4IN WHT --

## (undated) DEVICE — SUTURE TEMP PACE WIRE SZ 2-0 L24IN NONABSORBABLE LIGHT/DARK

## (undated) DEVICE — MEDI-TRACE CADENCE ADULT, DEFIBRILLATION ELECTRODE -RTS  (10 PR/PK) - ZOLL: Brand: MEDI-TRACE CADENCE

## (undated) DEVICE — SUTURE SILK PERMAHAND PRECUT 6 X 30 IN SZ 1 BLK BRAID A307H

## (undated) DEVICE — SUTURE ETHBND EXCEL 2-0 L30IN NONABSORBABLE GRN L26MM SH MX563

## (undated) DEVICE — CATHETER THOR 32FR L23IN PVC 6 EYELET STR ATRAUM

## (undated) DEVICE — DRAPE SLUSH DISC W44XL66IN ST FOR RND BSIN HUSH SLUSH SYS

## (undated) DEVICE — 48" PROBE COVER W/GEL, ULTRASOUND, STERILE: Brand: SITE-RITE

## (undated) DEVICE — GLOVE SURG SZ 7 L11.2IN THK9.8MIL STRW LTX POLYMER BEAD CUF

## (undated) DEVICE — SUT PROL 4-0 30IN SH1 DA BLU --

## (undated) DEVICE — SUTURE ETHBND EXCEL SZ 0 L18IN NONABSORBABLE GRN L36MM CT-1 CX21D

## (undated) DEVICE — SUT PROL 3-0 36IN SH DA BLU --

## (undated) DEVICE — SUTURE VCRL SZ 3-0 L36IN ABSRB UD L36MM CT-1 1/2 CIR J944H

## (undated) DEVICE — SYR BULB 60ML IRRIGATION -- CONVERT TO ITEM 116413

## (undated) DEVICE — COR-KNOT® QUICK LOAD® 6-POUCH: Brand: COR-KNOT® QUICK LOAD®

## (undated) DEVICE — 3000CC GUARDIAN II: Brand: GUARDIAN

## (undated) DEVICE — SOLUTION IV 1000ML 0.9% SOD CHL

## (undated) DEVICE — SUTURE PDS II SZ 1 L36IN ABSRB VLT L48MM CTX 1/2 CIR Z371T